# Patient Record
Sex: FEMALE | Race: WHITE | NOT HISPANIC OR LATINO | Employment: PART TIME | URBAN - METROPOLITAN AREA
[De-identification: names, ages, dates, MRNs, and addresses within clinical notes are randomized per-mention and may not be internally consistent; named-entity substitution may affect disease eponyms.]

---

## 2018-01-26 LAB
EXTERNAL HIV CONFIRMATION: NORMAL
EXTERNAL HIV SCREEN: NORMAL

## 2020-11-10 ENCOUNTER — TELEPHONE (OUTPATIENT)
Dept: ADMINISTRATIVE | Facility: OTHER | Age: 35
End: 2020-11-10

## 2020-11-11 ENCOUNTER — OFFICE VISIT (OUTPATIENT)
Dept: FAMILY MEDICINE CLINIC | Facility: CLINIC | Age: 35
End: 2020-11-11
Payer: COMMERCIAL

## 2020-11-11 VITALS
DIASTOLIC BLOOD PRESSURE: 78 MMHG | HEART RATE: 90 BPM | WEIGHT: 170 LBS | OXYGEN SATURATION: 99 % | HEIGHT: 64 IN | SYSTOLIC BLOOD PRESSURE: 118 MMHG | TEMPERATURE: 98.3 F | BODY MASS INDEX: 29.02 KG/M2

## 2020-11-11 DIAGNOSIS — Z13.1 SCREENING FOR DIABETES MELLITUS (DM): ICD-10-CM

## 2020-11-11 DIAGNOSIS — Z28.21 INFLUENZA VACCINATION DECLINED: ICD-10-CM

## 2020-11-11 DIAGNOSIS — K21.9 GASTROESOPHAGEAL REFLUX DISEASE WITHOUT ESOPHAGITIS: ICD-10-CM

## 2020-11-11 DIAGNOSIS — F43.23 SITUATIONAL MIXED ANXIETY AND DEPRESSIVE DISORDER: ICD-10-CM

## 2020-11-11 DIAGNOSIS — Z13.0 SCREENING FOR DEFICIENCY ANEMIA: ICD-10-CM

## 2020-11-11 DIAGNOSIS — Z76.89 ENCOUNTER TO ESTABLISH CARE: Primary | ICD-10-CM

## 2020-11-11 DIAGNOSIS — Z13.220 SCREENING FOR HYPERLIPIDEMIA: ICD-10-CM

## 2020-11-11 DIAGNOSIS — Z13.21 ENCOUNTER FOR VITAMIN DEFICIENCY SCREENING: ICD-10-CM

## 2020-11-11 DIAGNOSIS — E66.3 OVERWEIGHT WITH BODY MASS INDEX (BMI) OF 29 TO 29.9 IN ADULT: ICD-10-CM

## 2020-11-11 DIAGNOSIS — Z13.29 SCREENING FOR THYROID DISORDER: ICD-10-CM

## 2020-11-11 PROCEDURE — 99203 OFFICE O/P NEW LOW 30 MIN: CPT | Performed by: NURSE PRACTITIONER

## 2020-11-11 PROCEDURE — 3725F SCREEN DEPRESSION PERFORMED: CPT | Performed by: NURSE PRACTITIONER

## 2020-11-11 PROCEDURE — 3008F BODY MASS INDEX DOCD: CPT | Performed by: NURSE PRACTITIONER

## 2020-11-11 RX ORDER — ESCITALOPRAM OXALATE 10 MG/1
10 TABLET ORAL DAILY
Qty: 90 TABLET | Refills: 3 | Status: SHIPPED | OUTPATIENT
Start: 2020-11-11 | End: 2020-12-30 | Stop reason: ALTCHOICE

## 2020-11-11 RX ORDER — ESOMEPRAZOLE MAGNESIUM 40 MG/1
40 CAPSULE, DELAYED RELEASE ORAL
Qty: 30 CAPSULE | Refills: 0 | Status: SHIPPED | OUTPATIENT
Start: 2020-11-11 | End: 2020-12-15

## 2020-11-12 ENCOUNTER — TELEPHONE (OUTPATIENT)
Dept: FAMILY MEDICINE CLINIC | Facility: CLINIC | Age: 35
End: 2020-11-12

## 2020-12-12 DIAGNOSIS — K21.9 GASTROESOPHAGEAL REFLUX DISEASE WITHOUT ESOPHAGITIS: ICD-10-CM

## 2020-12-15 RX ORDER — ESOMEPRAZOLE MAGNESIUM 40 MG/1
CAPSULE, DELAYED RELEASE ORAL
Qty: 30 CAPSULE | Refills: 0 | Status: SHIPPED | OUTPATIENT
Start: 2020-12-15 | End: 2020-12-30 | Stop reason: SDUPTHER

## 2020-12-28 ENCOUNTER — LAB (OUTPATIENT)
Dept: LAB | Age: 35
End: 2020-12-28
Payer: COMMERCIAL

## 2020-12-28 DIAGNOSIS — Z13.0 SCREENING FOR DEFICIENCY ANEMIA: ICD-10-CM

## 2020-12-28 DIAGNOSIS — Z13.29 SCREENING FOR THYROID DISORDER: ICD-10-CM

## 2020-12-28 DIAGNOSIS — Z13.1 SCREENING FOR DIABETES MELLITUS (DM): ICD-10-CM

## 2020-12-28 DIAGNOSIS — Z13.21 ENCOUNTER FOR VITAMIN DEFICIENCY SCREENING: ICD-10-CM

## 2020-12-28 DIAGNOSIS — Z13.220 SCREENING FOR HYPERLIPIDEMIA: ICD-10-CM

## 2020-12-28 LAB
25(OH)D3 SERPL-MCNC: 13.6 NG/ML (ref 30–100)
ALBUMIN SERPL BCP-MCNC: 3.9 G/DL (ref 3.5–5)
ALP SERPL-CCNC: 59 U/L (ref 46–116)
ALT SERPL W P-5'-P-CCNC: 22 U/L (ref 12–78)
ANION GAP SERPL CALCULATED.3IONS-SCNC: 2 MMOL/L (ref 4–13)
AST SERPL W P-5'-P-CCNC: 8 U/L (ref 5–45)
BASOPHILS # BLD AUTO: 0.08 THOUSANDS/ΜL (ref 0–0.1)
BASOPHILS NFR BLD AUTO: 1 % (ref 0–1)
BILIRUB SERPL-MCNC: 0.31 MG/DL (ref 0.2–1)
BUN SERPL-MCNC: 12 MG/DL (ref 5–25)
CALCIUM SERPL-MCNC: 9.8 MG/DL (ref 8.3–10.1)
CHLORIDE SERPL-SCNC: 107 MMOL/L (ref 100–108)
CHOLEST SERPL-MCNC: 228 MG/DL (ref 50–200)
CO2 SERPL-SCNC: 29 MMOL/L (ref 21–32)
CREAT SERPL-MCNC: 0.72 MG/DL (ref 0.6–1.3)
EOSINOPHIL # BLD AUTO: 0.18 THOUSAND/ΜL (ref 0–0.61)
EOSINOPHIL NFR BLD AUTO: 2 % (ref 0–6)
ERYTHROCYTE [DISTWIDTH] IN BLOOD BY AUTOMATED COUNT: 14.1 % (ref 11.6–15.1)
GFR SERPL CREATININE-BSD FRML MDRD: 109 ML/MIN/1.73SQ M
GLUCOSE P FAST SERPL-MCNC: 101 MG/DL (ref 65–99)
HCT VFR BLD AUTO: 42.6 % (ref 34.8–46.1)
HDLC SERPL-MCNC: 51 MG/DL
HGB BLD-MCNC: 13.5 G/DL (ref 11.5–15.4)
IMM GRANULOCYTES # BLD AUTO: 0.01 THOUSAND/UL (ref 0–0.2)
IMM GRANULOCYTES NFR BLD AUTO: 0 % (ref 0–2)
LDLC SERPL CALC-MCNC: 136 MG/DL (ref 0–100)
LYMPHOCYTES # BLD AUTO: 1.99 THOUSANDS/ΜL (ref 0.6–4.47)
LYMPHOCYTES NFR BLD AUTO: 27 % (ref 14–44)
MCH RBC QN AUTO: 28.8 PG (ref 26.8–34.3)
MCHC RBC AUTO-ENTMCNC: 31.7 G/DL (ref 31.4–37.4)
MCV RBC AUTO: 91 FL (ref 82–98)
MONOCYTES # BLD AUTO: 0.64 THOUSAND/ΜL (ref 0.17–1.22)
MONOCYTES NFR BLD AUTO: 9 % (ref 4–12)
NEUTROPHILS # BLD AUTO: 4.55 THOUSANDS/ΜL (ref 1.85–7.62)
NEUTS SEG NFR BLD AUTO: 61 % (ref 43–75)
NONHDLC SERPL-MCNC: 177 MG/DL
NRBC BLD AUTO-RTO: 0 /100 WBCS
PLATELET # BLD AUTO: 291 THOUSANDS/UL (ref 149–390)
PMV BLD AUTO: 9.3 FL (ref 8.9–12.7)
POTASSIUM SERPL-SCNC: 4.6 MMOL/L (ref 3.5–5.3)
PROT SERPL-MCNC: 7.5 G/DL (ref 6.4–8.2)
RBC # BLD AUTO: 4.69 MILLION/UL (ref 3.81–5.12)
SODIUM SERPL-SCNC: 138 MMOL/L (ref 136–145)
TRIGL SERPL-MCNC: 205 MG/DL
TSH SERPL DL<=0.05 MIU/L-ACNC: 1.41 UIU/ML (ref 0.36–3.74)
WBC # BLD AUTO: 7.45 THOUSAND/UL (ref 4.31–10.16)

## 2020-12-28 PROCEDURE — 80053 COMPREHEN METABOLIC PANEL: CPT

## 2020-12-28 PROCEDURE — 84443 ASSAY THYROID STIM HORMONE: CPT

## 2020-12-28 PROCEDURE — 82306 VITAMIN D 25 HYDROXY: CPT

## 2020-12-28 PROCEDURE — 80061 LIPID PANEL: CPT

## 2020-12-28 PROCEDURE — 36415 COLL VENOUS BLD VENIPUNCTURE: CPT

## 2020-12-28 PROCEDURE — 85025 COMPLETE CBC W/AUTO DIFF WBC: CPT

## 2020-12-30 ENCOUNTER — LAB (OUTPATIENT)
Dept: LAB | Facility: CLINIC | Age: 35
End: 2020-12-30
Payer: COMMERCIAL

## 2020-12-30 ENCOUNTER — TRANSCRIBE ORDERS (OUTPATIENT)
Dept: LAB | Facility: CLINIC | Age: 35
End: 2020-12-30

## 2020-12-30 ENCOUNTER — OFFICE VISIT (OUTPATIENT)
Dept: FAMILY MEDICINE CLINIC | Facility: CLINIC | Age: 35
End: 2020-12-30
Payer: COMMERCIAL

## 2020-12-30 VITALS
SYSTOLIC BLOOD PRESSURE: 122 MMHG | BODY MASS INDEX: 29.71 KG/M2 | DIASTOLIC BLOOD PRESSURE: 62 MMHG | TEMPERATURE: 98.1 F | RESPIRATION RATE: 18 BRPM | OXYGEN SATURATION: 98 % | HEART RATE: 98 BPM | WEIGHT: 174 LBS | HEIGHT: 64 IN

## 2020-12-30 DIAGNOSIS — E66.3 OVERWEIGHT WITH BODY MASS INDEX (BMI) OF 29 TO 29.9 IN ADULT: ICD-10-CM

## 2020-12-30 DIAGNOSIS — K21.9 GASTROESOPHAGEAL REFLUX DISEASE WITHOUT ESOPHAGITIS: ICD-10-CM

## 2020-12-30 DIAGNOSIS — F41.9 ANXIETY: ICD-10-CM

## 2020-12-30 DIAGNOSIS — R19.8 SYMPTOMS OF GASTROESOPHAGEAL REFLUX: ICD-10-CM

## 2020-12-30 DIAGNOSIS — Z11.1 SCREENING FOR TUBERCULOSIS: ICD-10-CM

## 2020-12-30 DIAGNOSIS — F43.0 STRESS RESPONSE: ICD-10-CM

## 2020-12-30 DIAGNOSIS — Z13.0 SCREENING FOR DEFICIENCY ANEMIA: ICD-10-CM

## 2020-12-30 DIAGNOSIS — R14.2 BURPING: ICD-10-CM

## 2020-12-30 DIAGNOSIS — E55.9 VITAMIN D DEFICIENCY: ICD-10-CM

## 2020-12-30 DIAGNOSIS — Z00.00 ANNUAL PHYSICAL EXAM: Primary | ICD-10-CM

## 2020-12-30 DIAGNOSIS — Z13.31 POSITIVE DEPRESSION SCREENING: ICD-10-CM

## 2020-12-30 PROCEDURE — 3725F SCREEN DEPRESSION PERFORMED: CPT | Performed by: NURSE PRACTITIONER

## 2020-12-30 PROCEDURE — 4004F PT TOBACCO SCREEN RCVD TLK: CPT | Performed by: NURSE PRACTITIONER

## 2020-12-30 PROCEDURE — 36415 COLL VENOUS BLD VENIPUNCTURE: CPT

## 2020-12-30 PROCEDURE — 3008F BODY MASS INDEX DOCD: CPT | Performed by: NURSE PRACTITIONER

## 2020-12-30 PROCEDURE — 99395 PREV VISIT EST AGE 18-39: CPT | Performed by: NURSE PRACTITIONER

## 2020-12-30 PROCEDURE — 86480 TB TEST CELL IMMUN MEASURE: CPT

## 2020-12-30 RX ORDER — ESOMEPRAZOLE MAGNESIUM 40 MG/1
40 CAPSULE, DELAYED RELEASE ORAL
Qty: 90 CAPSULE | Refills: 1 | Status: SHIPPED | OUTPATIENT
Start: 2020-12-30 | End: 2021-03-31 | Stop reason: SDUPTHER

## 2020-12-30 RX ORDER — HYDROXYZINE PAMOATE 25 MG/1
25 CAPSULE ORAL 3 TIMES DAILY PRN
Qty: 30 CAPSULE | Refills: 0 | Status: SHIPPED | OUTPATIENT
Start: 2020-12-30 | End: 2021-02-21

## 2020-12-30 RX ORDER — HYDROXYZINE PAMOATE 25 MG/1
25 CAPSULE ORAL 3 TIMES DAILY PRN
Qty: 30 CAPSULE | Refills: 0 | Status: SHIPPED | OUTPATIENT
Start: 2020-12-30 | End: 2020-12-30 | Stop reason: SDUPTHER

## 2020-12-30 RX ORDER — ERGOCALCIFEROL 1.25 MG/1
50000 CAPSULE ORAL WEEKLY
Qty: 12 CAPSULE | Refills: 1 | Status: SHIPPED | OUTPATIENT
Start: 2020-12-30

## 2020-12-31 LAB
GAMMA INTERFERON BACKGROUND BLD IA-ACNC: 0.02 IU/ML
M TB IFN-G BLD-IMP: NEGATIVE
M TB IFN-G CD4+ BCKGRND COR BLD-ACNC: 0 IU/ML
M TB IFN-G CD4+ BCKGRND COR BLD-ACNC: 0 IU/ML
MITOGEN IGNF BCKGRD COR BLD-ACNC: >10 IU/ML

## 2021-02-21 DIAGNOSIS — F43.0 STRESS RESPONSE: ICD-10-CM

## 2021-02-21 DIAGNOSIS — F41.9 ANXIETY: ICD-10-CM

## 2021-02-21 RX ORDER — HYDROXYZINE PAMOATE 25 MG/1
CAPSULE ORAL
Qty: 30 CAPSULE | Refills: 0 | Status: SHIPPED | OUTPATIENT
Start: 2021-02-21 | End: 2021-08-05 | Stop reason: SINTOL

## 2021-03-31 ENCOUNTER — CONSULT (OUTPATIENT)
Dept: GASTROENTEROLOGY | Facility: CLINIC | Age: 36
End: 2021-03-31
Payer: COMMERCIAL

## 2021-03-31 VITALS
SYSTOLIC BLOOD PRESSURE: 108 MMHG | HEART RATE: 88 BPM | WEIGHT: 181.8 LBS | RESPIRATION RATE: 18 BRPM | BODY MASS INDEX: 31.04 KG/M2 | HEIGHT: 64 IN | DIASTOLIC BLOOD PRESSURE: 80 MMHG | TEMPERATURE: 97.5 F

## 2021-03-31 DIAGNOSIS — K21.9 GASTROESOPHAGEAL REFLUX DISEASE WITHOUT ESOPHAGITIS: ICD-10-CM

## 2021-03-31 DIAGNOSIS — R14.2 BURPING: ICD-10-CM

## 2021-03-31 DIAGNOSIS — R19.8 SYMPTOMS OF GASTROESOPHAGEAL REFLUX: ICD-10-CM

## 2021-03-31 PROCEDURE — 99204 OFFICE O/P NEW MOD 45 MIN: CPT | Performed by: PHYSICIAN ASSISTANT

## 2021-03-31 RX ORDER — ESOMEPRAZOLE MAGNESIUM 40 MG/1
40 CAPSULE, DELAYED RELEASE ORAL
Qty: 60 CAPSULE | Refills: 1 | Status: SHIPPED | OUTPATIENT
Start: 2021-03-31 | End: 2021-10-15

## 2021-03-31 NOTE — PROGRESS NOTES
[facility] - Outpatient Consultation  Linda Velazquez 39 y o  female MRN: [id number]  Encounter: [id number]          ASSESSMENT AND PLAN:      1  Gastroesophageal reflux disease without esophagitis    Patient presents for an evaluation of GERD x 1 year  She reports frequent regurgitation and belching  She reports partial improvement on Nexium 40mg po daily  Will plan for EGD to investigate for ulcers, esophagitis, hiatal hernia, Dial's esophagus, and bx for h pylori  Will increase Nexium to BID for 4-6 weeks  GERD modifications discussed  ______________________________________________________________________    HPI:  Patient is a 39year old female who presents to the office for an evaluation of GERD  Patient reports she has been struggling with GERD symptoms over the past year  She reports of frequent regurgitation, heartburn, and belching  No dysphagia  No abdominal pain  No blood in the stool  She was started on Nexium 40mg po daily by her PCP with some improvement  She has never had an EGD  No family history of Dial's esophagus or esophageal cancer  She does report a weight gain of 30 lbs since her pregnancy 3 years ago  She also reports significant stress in her life recently due to her 's diagnosis of stage IV colon cancer  REVIEW OF SYSTEMS:    CONSTITUTIONAL: Denies any fever, chills, rigors, and weight loss  HEENT: No earache or tinnitus  Denies hearing loss or visual disturbances  CARDIOVASCULAR: No chest pain or palpitations  RESPIRATORY: Denies any cough, hemoptysis, shortness of breath or dyspnea on exertion  GASTROINTESTINAL: As noted in the History of Present Illness  GENITOURINARY: No problems with urination  Denies any hematuria or dysuria  NEUROLOGIC: No dizziness or vertigo, denies headaches  MUSCULOSKELETAL: Denies any muscle or joint pain  SKIN: Denies skin rashes or itching     ENDOCRINE: Denies excessive thirst  Denies intolerance to heat or cold  PSYCHOSOCIAL: Denies depression or anxiety  Denies any recent memory loss  Historical Information   Past Medical History:   Diagnosis Date    Acid reflux      Past Surgical History:   Procedure Laterality Date     SECTION      DILATION AND CURETTAGE OF UTERUS      2x    TONSILECTOMY AND ADNOIDECTOMY      TUBAL LIGATION       Social History   Social History     Substance and Sexual Activity   Alcohol Use Yes    Frequency: 2-4 times a month    Comment: weekends mostly     Social History     Substance and Sexual Activity   Drug Use Never     Social History     Tobacco Use   Smoking Status Current Every Day Smoker    Packs/day: 0 25   Smokeless Tobacco Never Used     Family History   Problem Relation Age of Onset    Heart disease Mother     No Known Problems Father        Meds/Allergies       Current Outpatient Medications:     esomeprazole (NexIUM) 40 MG capsule    ergocalciferol (VITAMIN D2) 50,000 units    hydrOXYzine pamoate (VISTARIL) 25 mg capsule    Allergies   Allergen Reactions    Reglan [Metoclopramide]            Objective     Blood pressure 108/80, pulse 88, temperature 97 5 °F (36 4 °C), resp  rate 18, height 5' 3 5" (1 613 m), weight 82 5 kg (181 lb 12 8 oz)  Body mass index is 31 7 kg/m²  PHYSICAL EXAM:      General Appearance:   Alert, cooperative, no distress   HEENT:   Normocephalic, atraumatic, anicteric   Neck:  Supple, symmetrical, trachea midline   Lungs:   Clear to auscultation bilaterally; no rales, rhonchi or wheezing; respirations unlabored    Heart[de-identified]   Regular rate and rhythm; no murmur, rub, or gallop     Abdomen:   Soft, non-tender, non-distended; normal bowel sounds; no masses, no organomegaly    Genitalia:   Deferred    Rectal:   Deferred    Extremities:  No cyanosis, clubbing or edema    Pulses:  2+ and symmetric    Skin:  No jaundice, rashes, or lesions    Lymph nodes:  No palpable cervical lymphadenopathy        Lab Results: No visits with results within 1 Day(s) from this visit  Latest known visit with results is:   Lab on 12/30/2020   Component Date Value    QFT Nil 12/30/2020 0 02     QFT TB1-NIL 12/30/2020 0 00     QFT TB2-NIL 12/30/2020 0 00     QFT Mitogen-NIL 12/30/2020 >10 00     QFT Final Interpretation 12/30/2020 Negative          Radiology Results:   No results found

## 2021-03-31 NOTE — H&P (VIEW-ONLY)
[facility] - Outpatient Consultation  Silvana Garrett 39 y o  female MRN: [id number]  Encounter: [id number]          ASSESSMENT AND PLAN:      1  Gastroesophageal reflux disease without esophagitis    Patient presents for an evaluation of GERD x 1 year  She reports frequent regurgitation and belching  She reports partial improvement on Nexium 40mg po daily  Will plan for EGD to investigate for ulcers, esophagitis, hiatal hernia, Dial's esophagus, and bx for h pylori  Will increase Nexium to BID for 4-6 weeks  GERD modifications discussed  ______________________________________________________________________    HPI:  Patient is a 39year old female who presents to the office for an evaluation of GERD  Patient reports she has been struggling with GERD symptoms over the past year  She reports of frequent regurgitation, heartburn, and belching  No dysphagia  No abdominal pain  No blood in the stool  She was started on Nexium 40mg po daily by her PCP with some improvement  She has never had an EGD  No family history of Dial's esophagus or esophageal cancer  She does report a weight gain of 30 lbs since her pregnancy 3 years ago  She also reports significant stress in her life recently due to her 's diagnosis of stage IV colon cancer  REVIEW OF SYSTEMS:    CONSTITUTIONAL: Denies any fever, chills, rigors, and weight loss  HEENT: No earache or tinnitus  Denies hearing loss or visual disturbances  CARDIOVASCULAR: No chest pain or palpitations  RESPIRATORY: Denies any cough, hemoptysis, shortness of breath or dyspnea on exertion  GASTROINTESTINAL: As noted in the History of Present Illness  GENITOURINARY: No problems with urination  Denies any hematuria or dysuria  NEUROLOGIC: No dizziness or vertigo, denies headaches  MUSCULOSKELETAL: Denies any muscle or joint pain  SKIN: Denies skin rashes or itching     ENDOCRINE: Denies excessive thirst  Denies intolerance to heat or cold  PSYCHOSOCIAL: Denies depression or anxiety  Denies any recent memory loss  Historical Information   Past Medical History:   Diagnosis Date    Acid reflux      Past Surgical History:   Procedure Laterality Date     SECTION      DILATION AND CURETTAGE OF UTERUS      2x    TONSILECTOMY AND ADNOIDECTOMY      TUBAL LIGATION       Social History   Social History     Substance and Sexual Activity   Alcohol Use Yes    Frequency: 2-4 times a month    Comment: weekends mostly     Social History     Substance and Sexual Activity   Drug Use Never     Social History     Tobacco Use   Smoking Status Current Every Day Smoker    Packs/day: 0 25   Smokeless Tobacco Never Used     Family History   Problem Relation Age of Onset    Heart disease Mother     No Known Problems Father        Meds/Allergies       Current Outpatient Medications:     esomeprazole (NexIUM) 40 MG capsule    ergocalciferol (VITAMIN D2) 50,000 units    hydrOXYzine pamoate (VISTARIL) 25 mg capsule    Allergies   Allergen Reactions    Reglan [Metoclopramide]            Objective     Blood pressure 108/80, pulse 88, temperature 97 5 °F (36 4 °C), resp  rate 18, height 5' 3 5" (1 613 m), weight 82 5 kg (181 lb 12 8 oz)  Body mass index is 31 7 kg/m²  PHYSICAL EXAM:      General Appearance:   Alert, cooperative, no distress   HEENT:   Normocephalic, atraumatic, anicteric   Neck:  Supple, symmetrical, trachea midline   Lungs:   Clear to auscultation bilaterally; no rales, rhonchi or wheezing; respirations unlabored    Heart[de-identified]   Regular rate and rhythm; no murmur, rub, or gallop     Abdomen:   Soft, non-tender, non-distended; normal bowel sounds; no masses, no organomegaly    Genitalia:   Deferred    Rectal:   Deferred    Extremities:  No cyanosis, clubbing or edema    Pulses:  2+ and symmetric    Skin:  No jaundice, rashes, or lesions    Lymph nodes:  No palpable cervical lymphadenopathy        Lab Results: No visits with results within 1 Day(s) from this visit  Latest known visit with results is:   Lab on 12/30/2020   Component Date Value    QFT Nil 12/30/2020 0 02     QFT TB1-NIL 12/30/2020 0 00     QFT TB2-NIL 12/30/2020 0 00     QFT Mitogen-NIL 12/30/2020 >10 00     QFT Final Interpretation 12/30/2020 Negative          Radiology Results:   No results found

## 2021-04-17 ENCOUNTER — ANESTHESIA EVENT (OUTPATIENT)
Dept: GASTROENTEROLOGY | Facility: HOSPITAL | Age: 36
End: 2021-04-17

## 2021-04-19 ENCOUNTER — HOSPITAL ENCOUNTER (OUTPATIENT)
Dept: GASTROENTEROLOGY | Facility: HOSPITAL | Age: 36
Setting detail: OUTPATIENT SURGERY
Discharge: HOME | End: 2021-04-19
Attending: INTERNAL MEDICINE | Admitting: INTERNAL MEDICINE
Payer: COMMERCIAL

## 2021-04-19 ENCOUNTER — ANESTHESIA (OUTPATIENT)
Dept: GASTROENTEROLOGY | Facility: HOSPITAL | Age: 36
End: 2021-04-19

## 2021-04-19 VITALS
TEMPERATURE: 97.5 F | BODY MASS INDEX: 32.46 KG/M2 | HEART RATE: 78 BPM | WEIGHT: 183.2 LBS | OXYGEN SATURATION: 98 % | HEIGHT: 63 IN | RESPIRATION RATE: 17 BRPM | SYSTOLIC BLOOD PRESSURE: 134 MMHG | DIASTOLIC BLOOD PRESSURE: 93 MMHG

## 2021-04-19 DIAGNOSIS — R19.8 SYMPTOMS OF GASTROESOPHAGEAL REFLUX: ICD-10-CM

## 2021-04-19 DIAGNOSIS — K21.9 GASTROESOPHAGEAL REFLUX DISEASE WITHOUT ESOPHAGITIS: ICD-10-CM

## 2021-04-19 DIAGNOSIS — R14.2 BURPING: ICD-10-CM

## 2021-04-19 PROBLEM — F17.200 SMOKING: Status: ACTIVE | Noted: 2021-04-19

## 2021-04-19 PROBLEM — E66.9 OBESITY: Status: ACTIVE | Noted: 2020-11-11

## 2021-04-19 PROBLEM — IMO0001 SMOKING: Status: ACTIVE | Noted: 2021-04-19

## 2021-04-19 LAB
EXT PREGNANCY TEST URINE: NEGATIVE
EXT. CONTROL: NORMAL

## 2021-04-19 PROCEDURE — 88305 TISSUE EXAM BY PATHOLOGIST: CPT | Performed by: PATHOLOGY

## 2021-04-19 PROCEDURE — 81025 URINE PREGNANCY TEST: CPT | Performed by: ANESTHESIOLOGY

## 2021-04-19 PROCEDURE — 43239 EGD BIOPSY SINGLE/MULTIPLE: CPT | Performed by: INTERNAL MEDICINE

## 2021-04-19 RX ORDER — SODIUM CHLORIDE, SODIUM LACTATE, POTASSIUM CHLORIDE, CALCIUM CHLORIDE 600; 310; 30; 20 MG/100ML; MG/100ML; MG/100ML; MG/100ML
INJECTION, SOLUTION INTRAVENOUS CONTINUOUS PRN
Status: DISCONTINUED | OUTPATIENT
Start: 2021-04-19 | End: 2021-04-19

## 2021-04-19 RX ORDER — PROPOFOL 10 MG/ML
INJECTION, EMULSION INTRAVENOUS AS NEEDED
Status: DISCONTINUED | OUTPATIENT
Start: 2021-04-19 | End: 2021-04-19

## 2021-04-19 RX ORDER — SODIUM CHLORIDE, SODIUM LACTATE, POTASSIUM CHLORIDE, CALCIUM CHLORIDE 600; 310; 30; 20 MG/100ML; MG/100ML; MG/100ML; MG/100ML
125 INJECTION, SOLUTION INTRAVENOUS CONTINUOUS
Status: DISCONTINUED | OUTPATIENT
Start: 2021-04-19 | End: 2021-04-23 | Stop reason: HOSPADM

## 2021-04-19 RX ADMIN — PROPOFOL 50 MG: 10 INJECTION, EMULSION INTRAVENOUS at 10:44

## 2021-04-19 RX ADMIN — SODIUM CHLORIDE, SODIUM LACTATE, POTASSIUM CHLORIDE, AND CALCIUM CHLORIDE: .6; .31; .03; .02 INJECTION, SOLUTION INTRAVENOUS at 10:15

## 2021-04-19 RX ADMIN — SODIUM CHLORIDE, SODIUM LACTATE, POTASSIUM CHLORIDE, AND CALCIUM CHLORIDE 125 ML/HR: .6; .31; .03; .02 INJECTION, SOLUTION INTRAVENOUS at 10:34

## 2021-04-19 RX ADMIN — PROPOFOL 150 MG: 10 INJECTION, EMULSION INTRAVENOUS at 10:40

## 2021-04-19 RX ADMIN — PROPOFOL 20 MG: 10 INJECTION, EMULSION INTRAVENOUS at 10:47

## 2021-04-19 NOTE — INTERVAL H&P NOTE
H&P reviewed  After examining the patient I find no changes in the patients condition since the H&P had been written      Vitals:    04/19/21 1027   BP: 136/90   Pulse: 77   Resp: 16   Temp: 97 5 °F (36 4 °C)   SpO2: 99%

## 2021-04-19 NOTE — ANESTHESIA PREPROCEDURE EVALUATION
Procedure:  EGD    Relevant Problems   GI/HEPATIC   (+) Gastroesophageal reflux disease without esophagitis      NEURO/PSYCH   (+) Anxiety   (+) Stress response      PULMONARY   (+) Smoking      Other   (+) Obesity        Physical Exam    Airway    Mallampati score: II  TM Distance: >3 FB  Neck ROM: full     Dental   Comment: Denies loose teeth,     Cardiovascular  Cardiovascular exam normal    Pulmonary  Pulmonary exam normal     Other Findings  Portions of exam deferred due to low yield and/or unknown COVID status  Blister on lower right lip      Anesthesia Plan  ASA Score- 2     Anesthesia Type- IV sedation with anesthesia with ASA Monitors  Additional Monitors:   Airway Plan:           Plan Factors-Exercise tolerance (METS): >4 METS  Chart reviewed  Existing labs reviewed  Patient summary reviewed  Patient is a current smoker  Induction- intravenous  Postoperative Plan-     Informed Consent- Anesthetic plan and risks discussed with patient  I personally reviewed this patient with the CRNA  Discussed and agreed on the Anesthesia Plan with the MICHELLE Lima

## 2021-04-22 ENCOUNTER — TELEPHONE (OUTPATIENT)
Dept: GASTROENTEROLOGY | Facility: CLINIC | Age: 36
End: 2021-04-22

## 2021-04-22 NOTE — TELEPHONE ENCOUNTER
ptn esomeprazole requires prior auth per [facility] please call [phone]  Insurance will only cover 30 pills every 20 days and ptn needs 60 pills every 30 days as prescribed

## 2021-06-02 ENCOUNTER — OFFICE VISIT (OUTPATIENT)
Dept: FAMILY MEDICINE CLINIC | Facility: CLINIC | Age: 36
End: 2021-06-02
Payer: COMMERCIAL

## 2021-06-02 VITALS
SYSTOLIC BLOOD PRESSURE: 108 MMHG | HEART RATE: 70 BPM | DIASTOLIC BLOOD PRESSURE: 70 MMHG | TEMPERATURE: 96.6 F | OXYGEN SATURATION: 98 % | HEIGHT: 63 IN | BODY MASS INDEX: 32.43 KG/M2 | WEIGHT: 183 LBS

## 2021-06-02 DIAGNOSIS — H92.03 OTALGIA OF BOTH EARS: Primary | ICD-10-CM

## 2021-06-02 DIAGNOSIS — H69.90 DISORDER OF EUSTACHIAN TUBE, UNSPECIFIED LATERALITY: ICD-10-CM

## 2021-06-02 DIAGNOSIS — J02.9 SORE THROAT: ICD-10-CM

## 2021-06-02 PROCEDURE — 4004F PT TOBACCO SCREEN RCVD TLK: CPT | Performed by: NURSE PRACTITIONER

## 2021-06-02 PROCEDURE — 99213 OFFICE O/P EST LOW 20 MIN: CPT | Performed by: NURSE PRACTITIONER

## 2021-06-02 PROCEDURE — 3008F BODY MASS INDEX DOCD: CPT | Performed by: NURSE PRACTITIONER

## 2021-06-02 RX ORDER — METHYLPREDNISOLONE 4 MG/1
TABLET ORAL
Qty: 21 EACH | Refills: 0 | Status: SHIPPED | OUTPATIENT
Start: 2021-06-02 | End: 2021-07-30 | Stop reason: HOSPADM

## 2021-06-02 NOTE — PROGRESS NOTES
Assessment/Plan:    Problem List Items Addressed This Visit     None      Visit Diagnoses     Otalgia of both ears    -  Primary    Relevant Medications    methylPREDNISolone 4 MG tablet therapy pack    Sore throat        Disorder of Eustachian tube, unspecified laterality        Relevant Medications    methylPREDNISolone 4 MG tablet therapy pack           Diagnoses and all orders for this visit:    Otalgia of both ears  -     methylPREDNISolone 4 MG tablet therapy pack; Use as directed on package    Sore throat    Disorder of Eustachian tube, unspecified laterality  -     methylPREDNISolone 4 MG tablet therapy pack; Use as directed on package        No problem-specific Assessment & Plan notes found for this encounter  Subjective:      Patient ID: Dinorah Snow is a 39 y o  female  [facility]    C/O BL ear pain and sore throat for 4 days  Home Tx: none  Other sick contacts:none      Sore Throat   The current episode started in the past 7 days  Neither side of throat is experiencing more pain than the other  There has been no fever  Associated symptoms include ear pain  She has had no exposure to strep or mono  She has tried nothing for the symptoms  The treatment provided no relief  The following portions of the patient's history were reviewed and updated as appropriate:   She has a past medical history of Acid reflux, GERD (gastroesophageal reflux disease), and Hyperlipidemia ,  does not have any pertinent problems on file  ,   has a past surgical history that includes TONSILECTOMY AND ADNOIDECTOMY; Dilation and curettage of uterus;  section; and Tubal ligation  ,  family history includes Heart disease in her mother; No Known Problems in her father  ,   reports that she has been smoking  She has been smoking about 0 25 packs per day  She has never used smokeless tobacco  She reports current alcohol use of about 2 0 standard drinks of alcohol per week   She reports that she does not use drugs ,  is allergic to reglan [metoclopramide]     Current Outpatient Medications   Medication Sig Dispense Refill    ergocalciferol (VITAMIN D2) 50,000 units Take 1 capsule (50,000 Units total) by mouth once a week (Patient not taking: Reported on 6/2/2021) 12 capsule 1    esomeprazole (NexIUM) 40 MG capsule Take 1 capsule (40 mg total) by mouth 2 (two) times a day before meals 60 capsule 1    hydrOXYzine pamoate (VISTARIL) 25 mg capsule take 1 capsule by mouth three times a day if needed (Patient not taking: Reported on 3/31/2021) 30 capsule 0    methylPREDNISolone 4 MG tablet therapy pack Use as directed on package 21 each 0     No current facility-administered medications for this visit  Review of Systems   HENT: Positive for ear pain and sore throat  All other systems reviewed and are negative  Objective:  Vitals:    06/02/21 0833   BP: 108/70   BP Location: Left arm   Patient Position: Sitting   Cuff Size: Standard   Pulse: 70   Temp: (!) 96 6 °F (35 9 °C)   TempSrc: Tympanic   SpO2: 98%   Weight: 83 kg (183 lb)   Height: 5' 3" (1 6 m)     Body mass index is 32 42 kg/m²  Physical Exam  Vitals signs and nursing note reviewed  Constitutional:       Appearance: Normal appearance  She is well-developed  HENT:      Head: Normocephalic and atraumatic  Right Ear: Hearing, tympanic membrane, ear canal and external ear normal       Left Ear: Hearing, tympanic membrane, ear canal and external ear normal       Nose: Nose normal       Mouth/Throat:      Mouth: Mucous membranes are moist       Pharynx: Uvula midline  Eyes:      General: Lids are normal       Conjunctiva/sclera: Conjunctivae normal       Pupils: Pupils are equal, round, and reactive to light  Neck:      Musculoskeletal: Full passive range of motion without pain, normal range of motion and neck supple  Thyroid: No thyroid mass or thyromegaly  Vascular: No JVD        Trachea: Trachea and phonation normal  Cardiovascular:      Rate and Rhythm: Normal rate and regular rhythm  Pulses: Normal pulses  Heart sounds: Normal heart sounds, S1 normal and S2 normal  No murmur  No friction rub  No gallop  Pulmonary:      Effort: Pulmonary effort is normal       Breath sounds: Normal breath sounds  Abdominal:      General: Bowel sounds are normal       Palpations: Abdomen is soft  Tenderness: There is no abdominal tenderness  Genitourinary:     Comments: Deferred   Musculoskeletal: Normal range of motion  Right lower leg: No edema  Left lower leg: No edema  Lymphadenopathy:      Head:      Right side of head: No submental, submandibular, tonsillar, preauricular, posterior auricular or occipital adenopathy  Left side of head: No submental, submandibular, tonsillar, preauricular, posterior auricular or occipital adenopathy  Cervical: No cervical adenopathy  Skin:     General: Skin is warm and dry  Capillary Refill: Capillary refill takes less than 2 seconds  Neurological:      General: No focal deficit present  Mental Status: She is alert and oriented to person, place, and time  Cranial Nerves: Cranial nerves are intact  No cranial nerve deficit  Sensory: Sensation is intact  Motor: Motor function is intact  Coordination: Coordination is intact  Gait: Gait is intact  Deep Tendon Reflexes: Reflexes are normal and symmetric  Psychiatric:         Attention and Perception: Attention and perception normal          Mood and Affect: Mood and affect normal          Speech: Speech normal          Behavior: Behavior normal  Behavior is cooperative  Thought Content:  Thought content normal          Cognition and Memory: Cognition normal          Judgment: Judgment normal

## 2021-07-15 DIAGNOSIS — Z20.822 EXPOSURE TO COVID-19 VIRUS: Primary | ICD-10-CM

## 2021-07-15 PROCEDURE — U0003 INFECTIOUS AGENT DETECTION BY NUCLEIC ACID (DNA OR RNA); SEVERE ACUTE RESPIRATORY SYNDROME CORONAVIRUS 2 (SARS-COV-2) (CORONAVIRUS DISEASE [COVID-19]), AMPLIFIED PROBE TECHNIQUE, MAKING USE OF HIGH THROUGHPUT TECHNOLOGIES AS DESCRIBED BY CMS-2020-01-R: HCPCS | Performed by: INTERNAL MEDICINE

## 2021-07-15 PROCEDURE — U0005 INFEC AGEN DETEC AMPLI PROBE: HCPCS | Performed by: INTERNAL MEDICINE

## 2021-07-25 ENCOUNTER — APPOINTMENT (EMERGENCY)
Dept: RADIOLOGY | Facility: HOSPITAL | Age: 36
DRG: 137 | End: 2021-07-25
Payer: COMMERCIAL

## 2021-07-25 ENCOUNTER — HOSPITAL ENCOUNTER (EMERGENCY)
Facility: HOSPITAL | Age: 36
Discharge: HOME | DRG: 137 | End: 2021-07-25
Attending: EMERGENCY MEDICINE | Admitting: EMERGENCY MEDICINE
Payer: COMMERCIAL

## 2021-07-25 VITALS
SYSTOLIC BLOOD PRESSURE: 126 MMHG | WEIGHT: 182.1 LBS | BODY MASS INDEX: 32.26 KG/M2 | RESPIRATION RATE: 22 BRPM | OXYGEN SATURATION: 98 % | HEART RATE: 71 BPM | TEMPERATURE: 98.6 F | DIASTOLIC BLOOD PRESSURE: 76 MMHG

## 2021-07-25 DIAGNOSIS — J12.82 PNEUMONIA DUE TO COVID-19 VIRUS: Primary | ICD-10-CM

## 2021-07-25 DIAGNOSIS — U07.1 PNEUMONIA DUE TO COVID-19 VIRUS: Primary | ICD-10-CM

## 2021-07-25 LAB
ALBUMIN SERPL BCP-MCNC: 3.1 G/DL (ref 3.5–5)
ALP SERPL-CCNC: 67 U/L (ref 46–116)
ALT SERPL W P-5'-P-CCNC: 54 U/L (ref 12–78)
ANION GAP SERPL CALCULATED.3IONS-SCNC: 10 MMOL/L (ref 4–13)
APTT PPP: 28 SECONDS (ref 23–37)
AST SERPL W P-5'-P-CCNC: 49 U/L (ref 5–45)
BASOPHILS # BLD AUTO: 0.01 THOUSANDS/ΜL (ref 0–0.1)
BASOPHILS NFR BLD AUTO: 0 % (ref 0–1)
BILIRUB DIRECT SERPL-MCNC: 0.08 MG/DL (ref 0–0.2)
BILIRUB SERPL-MCNC: 0.2 MG/DL (ref 0.2–1)
BUN SERPL-MCNC: 8 MG/DL (ref 5–25)
CALCIUM SERPL-MCNC: 8.2 MG/DL (ref 8.3–10.1)
CHLORIDE SERPL-SCNC: 100 MMOL/L (ref 100–108)
CO2 SERPL-SCNC: 26 MMOL/L (ref 21–32)
CREAT SERPL-MCNC: 0.75 MG/DL (ref 0.6–1.3)
EOSINOPHIL # BLD AUTO: 0 THOUSAND/ΜL (ref 0–0.61)
EOSINOPHIL NFR BLD AUTO: 0 % (ref 0–6)
ERYTHROCYTE [DISTWIDTH] IN BLOOD BY AUTOMATED COUNT: 13.2 % (ref 11.6–15.1)
GFR SERPL CREATININE-BSD FRML MDRD: 103 ML/MIN/1.73SQ M
GLUCOSE SERPL-MCNC: 110 MG/DL (ref 65–140)
HCT VFR BLD AUTO: 41.7 % (ref 34.8–46.1)
HGB BLD-MCNC: 13.6 G/DL (ref 11.5–15.4)
IMM GRANULOCYTES # BLD AUTO: 0.01 THOUSAND/UL (ref 0–0.2)
IMM GRANULOCYTES NFR BLD AUTO: 0 % (ref 0–2)
INR PPP: 0.91 (ref 0.84–1.19)
LACTATE SERPL-SCNC: 1.2 MMOL/L (ref 0.5–2)
LYMPHOCYTES # BLD AUTO: 1.3 THOUSANDS/ΜL (ref 0.6–4.47)
LYMPHOCYTES NFR BLD AUTO: 30 % (ref 14–44)
MAGNESIUM SERPL-MCNC: 1.6 MG/DL (ref 1.6–2.6)
MCH RBC QN AUTO: 27.8 PG (ref 26.8–34.3)
MCHC RBC AUTO-ENTMCNC: 32.6 G/DL (ref 31.4–37.4)
MCV RBC AUTO: 85 FL (ref 82–98)
MONOCYTES # BLD AUTO: 0.31 THOUSAND/ΜL (ref 0.17–1.22)
MONOCYTES NFR BLD AUTO: 7 % (ref 4–12)
NEUTROPHILS # BLD AUTO: 2.68 THOUSANDS/ΜL (ref 1.85–7.62)
NEUTS SEG NFR BLD AUTO: 63 % (ref 43–75)
NRBC BLD AUTO-RTO: 0 /100 WBCS
PLATELET # BLD AUTO: 185 THOUSANDS/UL (ref 149–390)
PMV BLD AUTO: 9.3 FL (ref 8.9–12.7)
POTASSIUM SERPL-SCNC: 3.5 MMOL/L (ref 3.5–5.3)
PROT SERPL-MCNC: 7.4 G/DL (ref 6.4–8.2)
PROTHROMBIN TIME: 12.5 SECONDS (ref 11.6–14.5)
RBC # BLD AUTO: 4.89 MILLION/UL (ref 3.81–5.12)
SODIUM SERPL-SCNC: 136 MMOL/L (ref 136–145)
TROPONIN I SERPL-MCNC: <0.02 NG/ML
WBC # BLD AUTO: 4.31 THOUSAND/UL (ref 4.31–10.16)

## 2021-07-25 PROCEDURE — 99285 EMERGENCY DEPT VISIT HI MDM: CPT | Performed by: EMERGENCY MEDICINE

## 2021-07-25 PROCEDURE — 93005 ELECTROCARDIOGRAM TRACING: CPT

## 2021-07-25 PROCEDURE — 99285 EMERGENCY DEPT VISIT HI MDM: CPT

## 2021-07-25 PROCEDURE — 80048 BASIC METABOLIC PNL TOTAL CA: CPT | Performed by: EMERGENCY MEDICINE

## 2021-07-25 PROCEDURE — 96361 HYDRATE IV INFUSION ADD-ON: CPT

## 2021-07-25 PROCEDURE — 96374 THER/PROPH/DIAG INJ IV PUSH: CPT

## 2021-07-25 PROCEDURE — 80076 HEPATIC FUNCTION PANEL: CPT | Performed by: EMERGENCY MEDICINE

## 2021-07-25 PROCEDURE — 83735 ASSAY OF MAGNESIUM: CPT | Performed by: EMERGENCY MEDICINE

## 2021-07-25 PROCEDURE — 85025 COMPLETE CBC W/AUTO DIFF WBC: CPT | Performed by: EMERGENCY MEDICINE

## 2021-07-25 PROCEDURE — 84484 ASSAY OF TROPONIN QUANT: CPT | Performed by: EMERGENCY MEDICINE

## 2021-07-25 PROCEDURE — 85730 THROMBOPLASTIN TIME PARTIAL: CPT | Performed by: EMERGENCY MEDICINE

## 2021-07-25 PROCEDURE — 36415 COLL VENOUS BLD VENIPUNCTURE: CPT | Performed by: EMERGENCY MEDICINE

## 2021-07-25 PROCEDURE — 71045 X-RAY EXAM CHEST 1 VIEW: CPT

## 2021-07-25 PROCEDURE — 83605 ASSAY OF LACTIC ACID: CPT | Performed by: EMERGENCY MEDICINE

## 2021-07-25 PROCEDURE — 96375 TX/PRO/DX INJ NEW DRUG ADDON: CPT

## 2021-07-25 PROCEDURE — 85610 PROTHROMBIN TIME: CPT | Performed by: EMERGENCY MEDICINE

## 2021-07-25 RX ORDER — KETOROLAC TROMETHAMINE 30 MG/ML
15 INJECTION, SOLUTION INTRAMUSCULAR; INTRAVENOUS ONCE
Status: COMPLETED | OUTPATIENT
Start: 2021-07-25 | End: 2021-07-25

## 2021-07-25 RX ORDER — ALBUTEROL SULFATE 90 UG/1
5 AEROSOL, METERED RESPIRATORY (INHALATION) ONCE
Status: COMPLETED | OUTPATIENT
Start: 2021-07-25 | End: 2021-07-25

## 2021-07-25 RX ORDER — ONDANSETRON 4 MG/1
4 TABLET, ORALLY DISINTEGRATING ORAL EVERY 8 HOURS PRN
Qty: 12 TABLET | Refills: 0 | Status: SHIPPED | OUTPATIENT
Start: 2021-07-25

## 2021-07-25 RX ORDER — MULTIVITAMIN
1 TABLET ORAL DAILY
Qty: 30 TABLET | Refills: 0 | Status: SHIPPED | OUTPATIENT
Start: 2021-07-25

## 2021-07-25 RX ORDER — GUAIFENESIN 600 MG
1200 TABLET, EXTENDED RELEASE 12 HR ORAL ONCE
Status: COMPLETED | OUTPATIENT
Start: 2021-07-25 | End: 2021-07-25

## 2021-07-25 RX ORDER — ONDANSETRON 2 MG/ML
4 INJECTION INTRAMUSCULAR; INTRAVENOUS ONCE
Status: COMPLETED | OUTPATIENT
Start: 2021-07-25 | End: 2021-07-25

## 2021-07-25 RX ORDER — MELATONIN
2000 DAILY
Qty: 10 TABLET | Refills: 0 | Status: SHIPPED | OUTPATIENT
Start: 2021-07-25

## 2021-07-25 RX ORDER — MULTIVIT WITH MINERALS/LUTEIN
1000 TABLET ORAL EVERY 12 HOURS SCHEDULED
Qty: 20 TABLET | Refills: 0 | Status: SHIPPED | OUTPATIENT
Start: 2021-07-25

## 2021-07-25 RX ADMIN — GUAIFENESIN 1200 MG: 600 TABLET ORAL at 09:14

## 2021-07-25 RX ADMIN — KETOROLAC TROMETHAMINE 15 MG: 30 INJECTION, SOLUTION INTRAMUSCULAR at 09:18

## 2021-07-25 RX ADMIN — ALBUTEROL SULFATE 5 PUFF: 90 AEROSOL, METERED RESPIRATORY (INHALATION) at 09:18

## 2021-07-25 RX ADMIN — ONDANSETRON 4 MG: 2 INJECTION INTRAMUSCULAR; INTRAVENOUS at 09:15

## 2021-07-25 RX ADMIN — SODIUM CHLORIDE 1000 ML: 0.9 INJECTION, SOLUTION INTRAVENOUS at 09:14

## 2021-07-25 NOTE — ED NOTES
Ambulated patient x1 time around th ED- pt c/o SOB with exertion, 02 saturation remained between 95-97% with ambulation      Galen Carlin RN  07/25/21 7817

## 2021-07-25 NOTE — ED PROVIDER NOTES
History  Chief Complaint   Patient presents with    Shortness of Breath     pt states to have tested positive for covid on the 15th of this month  pt states "i feel like i just keep getting worse" pt c/o fever, SOB  pt afebrile during triage  pt states to have last taken tylenol at 630 this morning      Patient is a 77-year-old female with past medical history of GERD, diet-controlled hyperlipidemia, presents to the emergency department for worsening flu-like symptoms in the setting of known COVID-19 infection  Patient reports that she was diagnosed with COVID-19 on 7/15  She states it started with generalized body aches, nausea and then developed into fever, productive cough of dark phlegm, progressive dyspnea as well as lightheadedness  Patient reports she did have a fever this morning but took Tylenol and ibuprofen prior to coming to the ED  She reports her O2 sat at home was 87% which prompted her to come to the ER for evaluation  Currently patient satting 99% on room air and does not appear to be in any respiratory distress  Reports mild runny nose and congestion  She denies headache, vertigo, syncope, sore throat, earache, neck pain or stiffness, chest pain, palpitations, abdominal pain or distention, diarrhea or vomiting, urinary symptoms, skin rash or color change, leg pain or swelling, weakness or paresthesia or other focal neurologic deficits  History provided by:  Patient   used: No        Prior to Admission Medications   Prescriptions Last Dose Informant Patient Reported?  Taking?   ergocalciferol (VITAMIN D2) 50,000 units   No No   Sig: Take 1 capsule (50,000 Units total) by mouth once a week   Patient not taking: Reported on 6/2/2021   esomeprazole (NexIUM) 40 MG capsule   No No   Sig: Take 1 capsule (40 mg total) by mouth 2 (two) times a day before meals   hydrOXYzine pamoate (VISTARIL) 25 mg capsule   No No   Sig: take 1 capsule by mouth three times a day if needed Patient not taking: Reported on 3/31/2021   methylPREDNISolone 4 MG tablet therapy pack   No No   Sig: Use as directed on package      Facility-Administered Medications: None       Past Medical History:   Diagnosis Date    Acid reflux     GERD (gastroesophageal reflux disease)     Hyperlipidemia        Past Surgical History:   Procedure Laterality Date     SECTION      DILATION AND CURETTAGE OF UTERUS      2x    TONSILECTOMY AND ADNOIDECTOMY      TONSILLECTOMY      TUBAL LIGATION         Family History   Problem Relation Age of Onset    Heart disease Mother     No Known Problems Father      I have reviewed and agree with the history as documented  E-Cigarette/Vaping    E-Cigarette Use Never User      E-Cigarette/Vaping Substances     Social History     Tobacco Use    Smoking status: Current Every Day Smoker     Packs/day: 0 25    Smokeless tobacco: Never Used   Vaping Use    Vaping Use: Never used   Substance Use Topics    Alcohol use: Yes     Alcohol/week: 2 0 standard drinks     Types: 2 Glasses of wine per week     Comment: weekends mostly    Drug use: Never       Review of Systems   Constitutional: Positive for appetite change, chills, fatigue and fever  HENT: Positive for congestion and rhinorrhea  Negative for ear pain and sore throat  Eyes: Negative for photophobia, pain and visual disturbance  Respiratory: Positive for cough and shortness of breath  Negative for chest tightness, wheezing and stridor  Cardiovascular: Negative for chest pain, palpitations and leg swelling  Gastrointestinal: Positive for nausea  Negative for abdominal distention, abdominal pain, blood in stool, constipation, diarrhea and vomiting  Genitourinary: Negative for dysuria, flank pain, frequency and hematuria  Musculoskeletal: Positive for myalgias  Negative for back pain and neck pain  Skin: Negative for color change, pallor, rash and wound     Allergic/Immunologic: Negative for immunocompromised state  Neurological: Positive for light-headedness  Negative for dizziness, syncope, facial asymmetry, speech difficulty, weakness, numbness and headaches  Hematological: Negative for adenopathy  Psychiatric/Behavioral: Negative for confusion and decreased concentration  All other systems reviewed and are negative  Physical Exam  Physical Exam  Vitals and nursing note reviewed  Constitutional:       General: She is not in acute distress  Appearance: Normal appearance  She is well-developed  She is not ill-appearing, toxic-appearing or diaphoretic  HENT:      Head: Normocephalic and atraumatic  Right Ear: External ear normal       Left Ear: External ear normal       Nose: Nose normal       Mouth/Throat:      Mouth: Mucous membranes are moist       Pharynx: Oropharynx is clear  No oropharyngeal exudate  Eyes:      Extraocular Movements: Extraocular movements intact  Conjunctiva/sclera: Conjunctivae normal       Pupils: Pupils are equal, round, and reactive to light  Neck:      Vascular: No JVD  Cardiovascular:      Rate and Rhythm: Normal rate and regular rhythm  Pulses: Normal pulses  Heart sounds: Normal heart sounds  No murmur heard  No friction rub  No gallop  Pulmonary:      Effort: Pulmonary effort is normal  No respiratory distress  Breath sounds: Normal breath sounds  No wheezing, rhonchi or rales  Abdominal:      General: There is no distension  Palpations: Abdomen is soft  Tenderness: There is no abdominal tenderness  There is no guarding or rebound  Musculoskeletal:         General: No swelling or tenderness  Normal range of motion  Cervical back: Normal range of motion and neck supple  No rigidity  Skin:     General: Skin is warm and dry  Coloration: Skin is not pale  Findings: No erythema or rash  Neurological:      General: No focal deficit present        Mental Status: She is alert and oriented to person, place, and time  Sensory: No sensory deficit  Motor: No weakness     Psychiatric:         Mood and Affect: Mood normal          Behavior: Behavior normal          Vital Signs  ED Triage Vitals   Temperature Pulse Respirations Blood Pressure SpO2   07/25/21 0756 07/25/21 0756 07/25/21 0756 07/25/21 0756 07/25/21 0756   98 6 °F (37 °C) 88 22 132/87 99 %      Temp Source Heart Rate Source Patient Position - Orthostatic VS BP Location FiO2 (%)   07/25/21 0756 07/25/21 0756 07/25/21 0756 07/25/21 0756 --   Oral Monitor Lying Right arm       Pain Score       07/25/21 0918       No Pain         Vitals:    07/25/21 0756 07/25/21 0915 07/25/21 1205   BP: 132/87 127/76 126/76   BP Location: Right arm Right arm Right arm   Pulse: 88 81 71   Resp: 22 22 22   Temp: 98 6 °F (37 °C)     TempSrc: Oral     SpO2: 99% 98% 98%   Weight: 82 6 kg (182 lb 1 6 oz)         Visual Acuity      ED Medications  Medications   sodium chloride 0 9 % bolus 1,000 mL (0 mL Intravenous Stopped 7/25/21 1221)   albuterol (PROVENTIL HFA,VENTOLIN HFA) inhaler 5 puff (5 puffs Inhalation Given 7/25/21 0918)   ondansetron (ZOFRAN) injection 4 mg (4 mg Intravenous Given 7/25/21 0915)   ketorolac (TORADOL) injection 15 mg (15 mg Intravenous Given 7/25/21 0918)   guaiFENesin (MUCINEX) 12 hr tablet 1,200 mg (1,200 mg Oral Given 7/25/21 0914)       Diagnostic Studies  Results Reviewed     Procedure Component Value Units Date/Time    Basic metabolic panel [[id number]]  (Abnormal) Collected: 07/25/21 0904    Lab Status: Final result Specimen: Blood from Arm, Left Updated: 07/25/21 0934     Sodium 136 mmol/L      Potassium 3 5 mmol/L      Chloride 100 mmol/L      CO2 26 mmol/L      ANION GAP 10 mmol/L      BUN 8 mg/dL      Creatinine 0 75 mg/dL      Glucose 110 mg/dL      Calcium 8 2 mg/dL      eGFR 103 ml/min/1 73sq m     Narrative:      [place] guidelines for Chronic Kidney Disease (CKD):     Stage 1 with normal or high GFR (GFR > 90 mL/min/1 73 square meters)    Stage 2 Mild CKD (GFR = 60-89 mL/min/1 73 square meters)    Stage 3A Moderate CKD (GFR = 45-59 mL/min/1 73 square meters)    Stage 3B Moderate CKD (GFR = 30-44 mL/min/1 73 square meters)    Stage 4 Severe CKD (GFR = 15-29 mL/min/1 73 square meters)    Stage 5 End Stage CKD (GFR <15 mL/min/1 73 square meters)  Note: GFR calculation is accurate only with a steady state creatinine    Hepatic function panel [[id number]]  (Abnormal) Collected: 07/25/21 0904    Lab Status: Final result Specimen: Blood from Arm, Left Updated: 07/25/21 0934     Total Bilirubin 0 20 mg/dL      Bilirubin, Direct 0 08 mg/dL      Alkaline Phosphatase 67 U/L      AST 49 U/L      ALT 54 U/L      Total Protein 7 4 g/dL      Albumin 3 1 g/dL     Magnesium [[id number]]  (Normal) Collected: 07/25/21 0904    Lab Status: Final result Specimen: Blood from Arm, Left Updated: 07/25/21 0934     Magnesium 1 6 mg/dL     Lactic acid [[id number]]  (Normal) Collected: 07/25/21 0904    Lab Status: Final result Specimen: Blood from Arm, Left Updated: 07/25/21 0928     LACTIC ACID 1 2 mmol/L     Narrative:      Result may be elevated if tourniquet was used during collection      Troponin I [[id number]]  (Normal) Collected: 07/25/21 0904    Lab Status: Final result Specimen: Blood from Arm, Left Updated: 07/25/21 0928     Troponin I <0 02 ng/mL     Protime-INR [[id number]]  (Normal) Collected: 07/25/21 0904    Lab Status: Final result Specimen: Blood from Arm, Left Updated: 07/25/21 0921     Protime 12 5 seconds      INR 0 91    APTT [[id number]]  (Normal) Collected: 07/25/21 0904    Lab Status: Final result Specimen: Blood from Arm, Left Updated: 07/25/21 0921     PTT 28 seconds     CBC and differential [[id number]] Collected: 07/25/21 0904    Lab Status: Final result Specimen: Blood from Arm, Left Updated: 07/25/21 0912     WBC 4 31 Thousand/uL      RBC 4 89 Million/uL      Hemoglobin 13 6 g/dL      Hematocrit 41 7 % MCV 85 fL      MCH 27 8 pg      MCHC 32 6 g/dL      RDW 13 2 %      MPV 9 3 fL      Platelets 908 Thousands/uL      nRBC 0 /100 WBCs      Neutrophils Relative 63 %      Immat GRANS % 0 %      Lymphocytes Relative 30 %      Monocytes Relative 7 %      Eosinophils Relative 0 %      Basophils Relative 0 %      Neutrophils Absolute 2 68 Thousands/µL      Immature Grans Absolute 0 01 Thousand/uL      Lymphocytes Absolute 1 30 Thousands/µL      Monocytes Absolute 0 31 Thousand/µL      Eosinophils Absolute 0 00 Thousand/µL      Basophils Absolute 0 01 Thousands/µL                  XR chest 1 view portable   Final Result by Tashia Maya MD (07/25 7922)      Mild bibasilar ground glass opacity due to Covid 19 pneumonia  Workstation performed: [id number]                    Procedures  ECG 12 Lead Documentation Only    Date/Time: 7/25/2021 11:51 AM  Performed by: Soniya Duarte DO  Authorized by: Soniya Duarte DO     ECG reviewed by me, the ED Provider: yes    Patient location:  ED  Previous ECG:     Previous ECG:  Unavailable  Interpretation:     Interpretation: normal    Rate:     ECG rate:  73    ECG rate assessment: normal    Rhythm:     Rhythm: sinus rhythm    Ectopy:     Ectopy: none    QRS:     QRS axis:  Normal    QRS intervals:  Normal  Conduction:     Conduction: normal    ST segments:     ST segments:  Normal  T waves:     T waves: normal               ED Course  ED Course as of Jul 25 1621   Sun Jul 25, 2021   1149 Patient is ambulatory pulse ox was maintained between 96 and 98%  I did discuss essentially normal workup with patient as well as chest x-ray findings of COVID pneumonia  No indication for antibiotics at this time  Will prescribe high-dose vitamins and center home with her inhaler as well as recommending over-the-counter Tylenol, ibuprofen, Mucinex as needed    Discussed ED return parameters and patient does have a pulse ox at home and advised her to return immediately if it drops below 90% or her symptoms worsen especially her breathing  MDM  Number of Diagnoses or Management Options  Pneumonia due to COVID-19 virus  Diagnosis management comments: 70-year-old female with known COVID-19 infection diagnosed on 07/15, presents to the ED for worsening COVID-19 and flu-like symptoms as well as dyspnea  Patient reports hypoxia at home but currently is satting 99% on room air and not in any distress  Will workup with cardiac labs and chest x-ray  Will continue to monitor pulse ox while here and check ambulatory pulse ox prior to disposition decision  Will provide IV fluids, Zofran for nausea, albuterol inhaler for dyspnea and Mucinex for cough  Amount and/or Complexity of Data Reviewed  Clinical lab tests: ordered and reviewed  Tests in the radiology section of CPT®: ordered and reviewed  Tests in the medicine section of CPT®: ordered and reviewed  Review and summarize past medical records: yes  Independent visualization of images, tracings, or specimens: yes        Disposition  Final diagnoses:   Pneumonia due to COVID-19 virus     Time reflects when diagnosis was documented in both MDM as applicable and the Disposition within this note     Time User Action Codes Description Comment    7/25/2021 11:51 AM Sea MENJIVAR Add [U07 1,  J12 82] Pneumonia due to COVID-19 virus       ED Disposition     ED Disposition Condition Date/Time Comment    Discharge Stable Sun Jul 25, 2021 11:52 AM Mike Gomez discharge to home/self care              Follow-up Information     Follow up With Specialties Details Why Contact Info Additional [address], [address] Internal Medicine, Nurse Practitioner Schedule an appointment as soon as possible for a visit   [address] [facility]  [phone] [facility] Emergency Department Emergency Medicine Go to  If symptoms worsen Katy Holley [address] [facility]  [address] [facility] Emergency Department, [address] [facility], [facility], [address]          Discharge Medication List as of 7/25/2021 11:52 AM      START taking these medications    Details   Ascorbic Acid (vitamin C) 1000 MG tablet Take 1 tablet (1,000 mg total) by mouth every 12 (twelve) hours, Starting Sun 7/25/2021, Print      cholecalciferol (VITAMIN D3) 1,000 units tablet Take 2 tablets (2,000 Units total) by mouth daily, Starting Sun 7/25/2021, Print      Multiple Vitamin (multivitamin) tablet Take 1 tablet by mouth daily, Starting Sun 7/25/2021, Print      ondansetron (ZOFRAN-ODT) 4 mg disintegrating tablet Take 1 tablet (4 mg total) by mouth every 8 (eight) hours as needed for nausea or vomiting, Starting Sun 7/25/2021, Print         CONTINUE these medications which have NOT CHANGED    Details   ergocalciferol (VITAMIN D2) 50,000 units Take 1 capsule (50,000 Units total) by mouth once a week, Starting Wed 12/30/2020, Normal      esomeprazole (NexIUM) 40 MG capsule Take 1 capsule (40 mg total) by mouth 2 (two) times a day before meals, Starting Wed 3/31/2021, Until Sun 5/30/2021, Normal      hydrOXYzine pamoate (VISTARIL) 25 mg capsule take 1 capsule by mouth three times a day if needed, Normal      methylPREDNISolone 4 MG tablet therapy pack Use as directed on package, Normal           No discharge procedures on file      PDMP Review     None          ED Provider  Electronically Signed by           Keyla Choi DO  07/25/21 3622

## 2021-07-25 NOTE — DISCHARGE INSTRUCTIONS
COVID-19 (Coronavirus Disease 2019)   WHAT YOU NEED TO KNOW:   Coronavirus disease 2019 (COVID-19) is the disease caused by a coronavirus first discovered in December 2019  Coronaviruses generally cause upper respiratory (nose, throat, and lung) infections, such as a cold  The new virus spreads quickly and easily  The virus can be spread starting 2 days before symptoms even begin  The virus has also changed into several new forms (called variants) since it was discovered  The variants may be more contagious (easily spread) than the original form  Some may also cause more severe illness than others  It is important to follow local, national, and worldwide measures to protect yourself and others from infection  DISCHARGE INSTRUCTIONS:   If you think you or someone you know may be infected:  Do the following to protect others:  · If emergency care is needed,  tell the  about the possible infection, or call ahead and tell the emergency department  · Call a healthcare provider  for instructions if symptoms are mild  Anyone who may be infected should not  arrive without calling first  The provider will need to protect staff members and other patients  · The person who may be infected needs to wear a face covering  while getting medical care  This will help lower the risk of infecting others  Coverings are not used for anyone who is younger than 2 years, has breathing problems, or cannot remove it  The provider can give you instructions for anyone who cannot wear a covering  Call your local emergency number (911 in the [address] [place] [address],[address]) or an emergency department if:   · You have trouble breathing or shortness of breath at rest     · You have chest pain or pressure that lasts longer than 5 minutes  · You become confused or hard to wake  · Your lips or face are blue  · You have a fever of 104°F (40°C) or higher      Call your doctor if:   · You do not  have symptoms of COVID-19 but had close physical contact within 14 days with someone who tested positive  · You have questions or concerns about your condition or care  Medicines: You may need any of the following:  · Decongestants  help reduce nasal congestion and help you breathe more easily  If you take decongestant pills, they may make you feel restless or cause problems with your sleep  Do not use decongestant sprays for more than a few days  · Cough suppressants  help reduce coughing  Ask your healthcare provider which type of cough medicine is best for you  · Acetaminophen  decreases pain and fever  It is available without a doctor's order  Ask how much to take and how often to take it  Follow directions  Read the labels of all other medicines you are using to see if they also contain acetaminophen, or ask your doctor or pharmacist  Acetaminophen can cause liver damage if not taken correctly  Do not use more than 4 grams (4,000 milligrams) total of acetaminophen in one day  · NSAIDs , such as ibuprofen, help decrease swelling, pain, and fever  NSAIDs can cause stomach bleeding or kidney problems in certain people  If you take blood thinner medicine, always ask your healthcare provider if NSAIDs are safe for you  Always read the medicine label and follow directions  · Take your medicine as directed  Contact your healthcare provider if you think your medicine is not helping or if you have side effects  Tell him or her if you are allergic to any medicine  Keep a list of the medicines, vitamins, and herbs you take  Include the amounts, and when and why you take them  Bring the list or the pill bottles to follow-up visits  Carry your medicine list with you in case of an emergency  How the 2019 coronavirus spreads: The following are ways the virus is thought to spread, but more information may be coming:  · Droplets are the main way all coronaviruses spread  The virus travels in droplets that form when a person talks, coughs, or sneezes  The droplets can also float in the air for minutes or hours  Infection happens when you breathe in the droplets or get them in your eyes or nose  Close personal contact with an infected person increases your risk for infection  This means being within 6 feet (2 meters) of the person for at least 15 minutes over 24 hours  · Person-to-person contact can spread the virus  For example, a person with the virus on his or her hands can spread it by shaking hands with someone  · The virus can stay on objects and surfaces for a short time  You may become infected by touching the object or surface and then touching your eyes or mouth  · An infected animal may be able to infect a person who touches it  This may happen at live markets or on a farm  Help lower the risk for COVID-19:  The best way to prevent infection is to avoid anyone who is infected, but this can be hard to do  An infected person can spread the virus before signs or symptoms begin, or even if signs or symptoms never develop  The following can help lower the risk for infection:      · Wash your hands often throughout the day  Use soap and water  Rub your soapy hands together, lacing your fingers, for at least 20 seconds  Rinse with warm, running water  Dry your hands with a clean towel or paper towel  Use hand  that contains alcohol if soap and water are not available  Teach children how to wash their hands and use hand   · Cover sneezes and coughs  Turn your face away and cover your mouth and nose with a tissue  Throw the tissue away  Use the bend of your arm if a tissue is not available  Then wash your hands well with soap and water or use hand   Teach children how to cover a cough or sneeze  · Wear a face covering (mask) around anyone who does not live in your home  Use a cloth covering with at least 2 layers  You can also create layers by putting a cloth covering over a disposable non-medical mask  Cover your mouth and your nose  The covering should fit snugly against the bridge of your nose  Securely fasten it under your chin and on the sides of your face  Do not  wear a plastic face shield instead of a covering  Continue social distancing and washing your hands often  A face covering is not a substitute for social distancing safety measures  · Follow worldwide, national, and local social distancing guidelines  Keep at least 6 feet (2 meters) between you and others  Also keep this distance from anyone who comes to your home, such as someone making a delivery  Wear a face covering while you are around others  You will need to wear a covering in restaurants, stores, and other public buildings  You will also need a covering on mass transit, such as a bus, subway, or airplane  Remember to use a covering made from thick material or wear 2 coverings together  · Make a habit of not touching your face  If you get the virus on your hands, you can transfer it to your eyes, nose, or mouth and become infected  You can also transfer it to objects, surfaces, or people  Do not touch your eyes, nose, or mouth without washing your hands first     · Clean and disinfect high-touch surfaces and objects often  Use disinfecting wipes, or make a solution of 4 teaspoons of bleach in 1 quart (4 cups) of water  Clean and disinfect even if you think no one living in or coming to your home is infected with the virus  · Ask about vaccines you may need  Get a COVID-19 vaccine when it is available to you  The current vaccines are given as a shot in 1 or 2 doses  Get the influenza (flu) vaccine as soon as recommended each year, usually starting in September or October  Get the pneumonia vaccine if recommended  Follow social distancing guidelines:  National and local social distancing rules vary  Rules may change over time as restrictions are lifted  Restrictions may return if an outbreak happens where you live   It is employer's rules so everyone stays safe  If you have COVID-19 and are recovering at home,  healthcare providers will give you specific instructions to follow  The following are general guidelines to remind you how to keep others safe until you are well:  · Wash your hands often  Use soap and water as much as possible  Use hand  that contains alcohol if soap and water are not available  Dry your hands with a clean towel or paper towel  Do not share towels with anyone  If you use paper towels, throw them away in a lined trash can kept in your room or area  Use a covered trash can, if possible  · Do not go out of your home unless it is necessary  Ask someone who is not infected to go out for groceries or supplies, or have them delivered  Do not go to your healthcare provider's office without an appointment  · Only have close physical contact with a person giving direct care, or a baby or child you must care for  Family members and friends should not visit you  If possible, stay in a separate area or room of your home if you live with others  No one should go into the area or room except to give you care  You can visit with others by phone, video chat, e-mail, or similar systems  · Wear a face covering while others are near you  This can help prevent droplets from spreading the virus when you talk, sneeze, or cough  Put the covering on before anyone comes into your room or area  Remind the person to cover his or her nose and mouth before coming in to provide care for you  · Do not share items  Do not share dishes, towels, or other items with anyone  Items need to be washed after you use them  · Protect your baby  Some newborns have tested positive for the virus  It is not known if they became infected before or after birth  The highest risk is when a  has close contact with an infected person   If you are pregnant or breastfeeding, talk to your healthcare provider or obstetrician about any concerns you have  He or she will tell you when to bring your baby in for check-ups and vaccines  He or she will also tell you what to do if you think your baby was infected with the coronavirus  Wash your hands and put on a clean face covering before you breastfeed or care for your baby  · Do not handle live animals unless it is necessary  Some animals, including pets, have been infected with the new coronavirus  Ask someone who is not infected to take care of your pet until you are well  If you must care for a pet, wear a face covering  Wash your hands before and after you give care  Talk to your healthcare provider about how to keep a service animal safe, if needed  · Follow directions from your healthcare provider for being around others after you recover  It is not known if or for how long a recovered person can pass the virus to others  Your provider may give you instructions, such as continuing social distancing and wearing a face covering  He or she will tell you when it is okay to be around others again  This may be 10 to 20 days after symptoms started or you had a positive test  Most symptoms will also need to be gone  Your provider will give you more information  Follow up with your doctor as directed:  Write down your questions so you remember to ask them during your visits  For more information:   · Centers for Disease Control and Prevention  [address] , [address]  Phone: 7- 183 - [phone]  Web Address: [employer]    © [facility] 2021 Information is for End User's use only and may not be sold, redistributed or otherwise used for commercial purposes  All illustrations and images included in CareNotes® are the copyrighted property of [employer]  or [facility] [employer]   The above information is an  only  It is not intended as medical advice for individual conditions or treatments   Talk to your doctor, nurse or pharmacist before following any medical regimen to see if it is safe and effective for you  Viral Pneumonia   WHAT YOU NEED TO KNOW:   Viral pneumonia is a lung infection caused by a virus, such as influenza  You can get a viral infection by breathing in the virus or by touching something that has the virus on it  You can develop viral pneumonia if a virus in your body travels to your lungs  DISCHARGE INSTRUCTIONS:   Seek care immediately if:   · You have more trouble breathing or your breathing seems faster than normal      · Your lips or fingernails turn blue  · You are confused and cannot think clearly  · You are urinating less or not at all  Contact your healthcare provider if:   · Your symptoms are not getting better or are getting worse, even after treatment  · You have questions or concerns about your condition or care  Medicines: You may need any of the following:  · Antiviral medicine  is given to treat an infection caused by a virus  Antivirals work best if taken within 72 hours of infection  After 72 hours, the medicine can still help shorten the amount of time you have the virus, or reduce your symptoms  · Acetaminophen  decreases pain and fever  It is available without a doctor's order  Ask how much to take and how often to take it  Follow directions  Read the labels of all other medicines you are using to see if they also contain acetaminophen, or ask your doctor or pharmacist  Acetaminophen can cause liver damage if not taken correctly  Do not use more than 4 grams (4,000 milligrams) total of acetaminophen in one day  · NSAIDs , such as ibuprofen, help decrease swelling, pain, and fever  This medicine is available with or without a doctor's order  NSAIDs can cause stomach bleeding or kidney problems in certain people  If you take blood thinner medicine, always ask your healthcare provider if NSAIDs are safe for you  Always read the medicine label and follow directions      · Take your medicine as directed  Contact your healthcare provider if you think your medicine is not helping or if you have side effects  Tell him or her if you are allergic to any medicine  Keep a list of the medicines, vitamins, and herbs you take  Include the amounts, and when and why you take them  Bring the list or the pill bottles to follow-up visits  Carry your medicine list with you in case of an emergency  Manage your symptoms:   · Rest as needed  Rest often while you recover  Slowly start to do more each day  · Drink liquids as directed  Ask how much liquid to drink each day and which liquids are best for you  Liquids help thin your mucus, which may make it easier for you to cough it up  · Do not smoke  Avoid secondhand smoke  Smoking makes it harder for you to get better  Nicotine and other chemicals in cigarettes and cigars can cause lung damage  Ask your healthcare provider for information if you currently smoke and need help to quit  E-cigarettes or smokeless tobacco still contain nicotine  Talk to your healthcare provider before you use these products  · Use a cool mist humidifier  A humidifier will help increase air moisture in your home  This may make it easier for you to breathe and help decrease your cough  · Keep your head elevated  You may be able to breathe better if you lie down with the head of your bed up  Prevent viral pneumonia:   · Prevent the spread of germs  Wash your hands often with soap and water  Use gel hand cleanser when there is no soap and water available  Do not touch your eyes, nose, or mouth unless you have washed your hands first  Cover your mouth when you cough  Cough into a tissue or your shirtsleeve so you do not spread germs from your hands  If you are sick, stay away from others as much as possible  · Ask about vaccines  You may need a vaccine to help prevent pneumonia   Get an influenza (flu) vaccine every year as soon as it becomes available  Follow up with your healthcare provider as directed:  Write down your questions so you remember to ask them during your visits  © Copyright [employer] 2021 Information is for End User's use only and may not be sold, redistributed or otherwise used for commercial purposes  All illustrations and images included in CareNotes® are the copyrighted property of [employer]  or [employer]  The above information is an  only  It is not intended as medical advice for individual conditions or treatments  Talk to your doctor, nurse or pharmacist before following any medical regimen to see if it is safe and effective for you

## 2021-07-26 ENCOUNTER — HOSPITAL ENCOUNTER (INPATIENT)
Facility: HOSPITAL | Age: 36
LOS: 4 days | Discharge: HOME | DRG: 137 | End: 2021-07-30
Attending: EMERGENCY MEDICINE | Admitting: INTERNAL MEDICINE
Payer: COMMERCIAL

## 2021-07-26 ENCOUNTER — APPOINTMENT (EMERGENCY)
Dept: RADIOLOGY | Facility: HOSPITAL | Age: 36
DRG: 137 | End: 2021-07-26
Payer: COMMERCIAL

## 2021-07-26 DIAGNOSIS — F17.200 SMOKING: ICD-10-CM

## 2021-07-26 DIAGNOSIS — J12.82 PNEUMONIA DUE TO COVID-19 VIRUS: Primary | ICD-10-CM

## 2021-07-26 DIAGNOSIS — U07.1 PNEUMONIA DUE TO COVID-19 VIRUS: Primary | ICD-10-CM

## 2021-07-26 DIAGNOSIS — R09.02 HYPOXIA: ICD-10-CM

## 2021-07-26 PROBLEM — J96.01 ACUTE RESPIRATORY FAILURE WITH HYPOXEMIA (HCC): Status: ACTIVE | Noted: 2021-07-26

## 2021-07-26 LAB
ALBUMIN SERPL BCP-MCNC: 3 G/DL (ref 3.5–5)
ALP SERPL-CCNC: 63 U/L (ref 46–116)
ALT SERPL W P-5'-P-CCNC: 100 U/L (ref 12–78)
ANION GAP SERPL CALCULATED.3IONS-SCNC: 10 MMOL/L (ref 4–13)
APTT PPP: 28 SECONDS (ref 23–37)
AST SERPL W P-5'-P-CCNC: 59 U/L (ref 5–45)
BASOPHILS # BLD MANUAL: 0 THOUSAND/UL (ref 0–0.1)
BASOPHILS NFR MAR MANUAL: 0 % (ref 0–1)
BILIRUB SERPL-MCNC: 0.21 MG/DL (ref 0.2–1)
BUN SERPL-MCNC: 9 MG/DL (ref 5–25)
CALCIUM ALBUM COR SERPL-MCNC: 9.1 MG/DL (ref 8.3–10.1)
CALCIUM SERPL-MCNC: 8.3 MG/DL (ref 8.3–10.1)
CHLORIDE SERPL-SCNC: 104 MMOL/L (ref 100–108)
CO2 SERPL-SCNC: 25 MMOL/L (ref 21–32)
CREAT SERPL-MCNC: 0.83 MG/DL (ref 0.6–1.3)
CRP SERPL QL: 51.8 MG/L
D DIMER PPP FEU-MCNC: 0.62 UG/ML FEU
EOSINOPHIL # BLD MANUAL: 0 THOUSAND/UL (ref 0–0.4)
EOSINOPHIL NFR BLD MANUAL: 0 % (ref 0–6)
ERYTHROCYTE [DISTWIDTH] IN BLOOD BY AUTOMATED COUNT: 13.7 % (ref 11.6–15.1)
FERRITIN SERPL-MCNC: 202 NG/ML (ref 8–388)
FIBRINOGEN PPP-MCNC: 385 MG/DL (ref 227–495)
GFR SERPL CREATININE-BSD FRML MDRD: 91 ML/MIN/1.73SQ M
GLUCOSE SERPL-MCNC: 104 MG/DL (ref 65–140)
HCT VFR BLD AUTO: 41 % (ref 34.8–46.1)
HGB BLD-MCNC: 13.2 G/DL (ref 11.5–15.4)
INR PPP: 0.96 (ref 0.84–1.19)
LACTATE SERPL-SCNC: 1.2 MMOL/L (ref 0.5–2)
LDH SERPL-CCNC: 267 U/L (ref 81–234)
LYMPHOCYTES # BLD AUTO: 1.49 THOUSAND/UL (ref 0.6–4.47)
LYMPHOCYTES # BLD AUTO: 39 % (ref 14–44)
MCH RBC QN AUTO: 28 PG (ref 26.8–34.3)
MCHC RBC AUTO-ENTMCNC: 32.2 G/DL (ref 31.4–37.4)
MCV RBC AUTO: 87 FL (ref 82–98)
MONOCYTES # BLD AUTO: 0.34 THOUSAND/UL (ref 0–1.22)
MONOCYTES NFR BLD: 9 % (ref 4–12)
NEUTROPHILS # BLD MANUAL: 1.98 THOUSAND/UL (ref 1.85–7.62)
NEUTS BAND NFR BLD MANUAL: 12 % (ref 0–8)
NEUTS SEG NFR BLD AUTO: 40 % (ref 43–75)
NT-PROBNP SERPL-MCNC: 138 PG/ML
PLATELET # BLD AUTO: 223 THOUSANDS/UL (ref 149–390)
PLATELET BLD QL SMEAR: ADEQUATE
PMV BLD AUTO: 9.3 FL (ref 8.9–12.7)
POTASSIUM SERPL-SCNC: 3.3 MMOL/L (ref 3.5–5.3)
PROCALCITONIN SERPL-MCNC: <0.05 NG/ML
PROT SERPL-MCNC: 6.9 G/DL (ref 6.4–8.2)
PROTHROMBIN TIME: 12.3 SECONDS (ref 11.6–14.5)
RBC # BLD AUTO: 4.72 MILLION/UL (ref 3.81–5.12)
SODIUM SERPL-SCNC: 139 MMOL/L (ref 136–145)
TOTAL CELLS COUNTED SPEC: 100
TROPONIN I SERPL-MCNC: <0.02 NG/ML
WBC # BLD AUTO: 3.81 THOUSAND/UL (ref 4.31–10.16)

## 2021-07-26 PROCEDURE — 85730 THROMBOPLASTIN TIME PARTIAL: CPT | Performed by: EMERGENCY MEDICINE

## 2021-07-26 PROCEDURE — 85610 PROTHROMBIN TIME: CPT | Performed by: EMERGENCY MEDICINE

## 2021-07-26 PROCEDURE — 82728 ASSAY OF FERRITIN: CPT | Performed by: EMERGENCY MEDICINE

## 2021-07-26 PROCEDURE — 99285 EMERGENCY DEPT VISIT HI MDM: CPT

## 2021-07-26 PROCEDURE — 85027 COMPLETE CBC AUTOMATED: CPT | Performed by: EMERGENCY MEDICINE

## 2021-07-26 PROCEDURE — 83880 ASSAY OF NATRIURETIC PEPTIDE: CPT | Performed by: EMERGENCY MEDICINE

## 2021-07-26 PROCEDURE — 83520 IMMUNOASSAY QUANT NOS NONAB: CPT | Performed by: EMERGENCY MEDICINE

## 2021-07-26 PROCEDURE — 84484 ASSAY OF TROPONIN QUANT: CPT | Performed by: EMERGENCY MEDICINE

## 2021-07-26 PROCEDURE — 85007 BL SMEAR W/DIFF WBC COUNT: CPT | Performed by: EMERGENCY MEDICINE

## 2021-07-26 PROCEDURE — 36415 COLL VENOUS BLD VENIPUNCTURE: CPT | Performed by: EMERGENCY MEDICINE

## 2021-07-26 PROCEDURE — 83615 LACTATE (LD) (LDH) ENZYME: CPT | Performed by: EMERGENCY MEDICINE

## 2021-07-26 PROCEDURE — 93005 ELECTROCARDIOGRAM TRACING: CPT

## 2021-07-26 PROCEDURE — 84145 PROCALCITONIN (PCT): CPT | Performed by: EMERGENCY MEDICINE

## 2021-07-26 PROCEDURE — 80053 COMPREHEN METABOLIC PANEL: CPT | Performed by: EMERGENCY MEDICINE

## 2021-07-26 PROCEDURE — 85384 FIBRINOGEN ACTIVITY: CPT | Performed by: EMERGENCY MEDICINE

## 2021-07-26 PROCEDURE — 85379 FIBRIN DEGRADATION QUANT: CPT | Performed by: EMERGENCY MEDICINE

## 2021-07-26 PROCEDURE — 99285 EMERGENCY DEPT VISIT HI MDM: CPT | Performed by: EMERGENCY MEDICINE

## 2021-07-26 PROCEDURE — XW033E5 INTRODUCTION OF REMDESIVIR ANTI-INFECTIVE INTO PERIPHERAL VEIN, PERCUTANEOUS APPROACH, NEW TECHNOLOGY GROUP 5: ICD-10-PCS | Performed by: EMERGENCY MEDICINE

## 2021-07-26 PROCEDURE — 99406 BEHAV CHNG SMOKING 3-10 MIN: CPT | Performed by: INTERNAL MEDICINE

## 2021-07-26 PROCEDURE — 96374 THER/PROPH/DIAG INJ IV PUSH: CPT

## 2021-07-26 PROCEDURE — 71045 X-RAY EXAM CHEST 1 VIEW: CPT

## 2021-07-26 PROCEDURE — 86140 C-REACTIVE PROTEIN: CPT | Performed by: EMERGENCY MEDICINE

## 2021-07-26 PROCEDURE — 99223 1ST HOSP IP/OBS HIGH 75: CPT | Performed by: INTERNAL MEDICINE

## 2021-07-26 PROCEDURE — 83605 ASSAY OF LACTIC ACID: CPT | Performed by: EMERGENCY MEDICINE

## 2021-07-26 RX ORDER — DEXAMETHASONE SODIUM PHOSPHATE 4 MG/ML
6 INJECTION, SOLUTION INTRA-ARTICULAR; INTRALESIONAL; INTRAMUSCULAR; INTRAVENOUS; SOFT TISSUE EVERY 24 HOURS
Status: DISCONTINUED | OUTPATIENT
Start: 2021-07-27 | End: 2021-07-30 | Stop reason: HOSPADM

## 2021-07-26 RX ORDER — FAMOTIDINE 20 MG/1
20 TABLET, FILM COATED ORAL 2 TIMES DAILY
Status: DISCONTINUED | OUTPATIENT
Start: 2021-07-26 | End: 2021-07-30 | Stop reason: HOSPADM

## 2021-07-26 RX ORDER — NICOTINE 21 MG/24HR
1 PATCH, TRANSDERMAL 24 HOURS TRANSDERMAL DAILY
Status: DISCONTINUED | OUTPATIENT
Start: 2021-07-27 | End: 2021-07-30 | Stop reason: HOSPADM

## 2021-07-26 RX ORDER — ASCORBIC ACID 500 MG
1000 TABLET ORAL EVERY 12 HOURS SCHEDULED
Status: DISCONTINUED | OUTPATIENT
Start: 2021-07-26 | End: 2021-07-30 | Stop reason: HOSPADM

## 2021-07-26 RX ORDER — ACETAMINOPHEN 325 MG/1
650 TABLET ORAL EVERY 6 HOURS PRN
Status: DISCONTINUED | OUTPATIENT
Start: 2021-07-26 | End: 2021-07-27

## 2021-07-26 RX ORDER — MULTIVITAMIN/IRON/FOLIC ACID 18MG-0.4MG
1 TABLET ORAL DAILY
Status: DISCONTINUED | OUTPATIENT
Start: 2021-08-03 | End: 2021-07-30 | Stop reason: HOSPADM

## 2021-07-26 RX ORDER — ZINC SULFATE 50(220)MG
220 CAPSULE ORAL DAILY
Status: DISCONTINUED | OUTPATIENT
Start: 2021-07-27 | End: 2021-07-30 | Stop reason: HOSPADM

## 2021-07-26 RX ORDER — ONDANSETRON 2 MG/ML
4 INJECTION INTRAMUSCULAR; INTRAVENOUS EVERY 6 HOURS PRN
Status: DISCONTINUED | OUTPATIENT
Start: 2021-07-26 | End: 2021-07-30 | Stop reason: HOSPADM

## 2021-07-26 RX ORDER — MELATONIN
2000 DAILY
Status: DISCONTINUED | OUTPATIENT
Start: 2021-07-27 | End: 2021-07-30 | Stop reason: HOSPADM

## 2021-07-26 RX ORDER — DOXYCYCLINE HYCLATE 100 MG/1
100 CAPSULE ORAL EVERY 12 HOURS
Status: DISCONTINUED | OUTPATIENT
Start: 2021-07-26 | End: 2021-07-27

## 2021-07-26 RX ORDER — DEXAMETHASONE SODIUM PHOSPHATE 10 MG/ML
10 INJECTION, SOLUTION INTRAMUSCULAR; INTRAVENOUS ONCE
Status: COMPLETED | OUTPATIENT
Start: 2021-07-26 | End: 2021-07-26

## 2021-07-26 RX ADMIN — OXYCODONE HYDROCHLORIDE AND ACETAMINOPHEN 1000 MG: 500 TABLET ORAL at 20:51

## 2021-07-26 RX ADMIN — FAMOTIDINE 20 MG: 20 TABLET ORAL at 17:56

## 2021-07-26 RX ADMIN — DOXYCYCLINE 100 MG: 100 CAPSULE ORAL at 17:56

## 2021-07-26 RX ADMIN — DEXAMETHASONE SODIUM PHOSPHATE 10 MG: 10 INJECTION, SOLUTION INTRAMUSCULAR; INTRAVENOUS at 15:19

## 2021-07-26 RX ADMIN — REMDESIVIR 200 MG: 100 INJECTION, POWDER, LYOPHILIZED, FOR SOLUTION INTRAVENOUS at 16:17

## 2021-07-26 RX ADMIN — CEFTRIAXONE SODIUM 1000 MG: 10 INJECTION, POWDER, FOR SOLUTION INTRAVENOUS at 20:52

## 2021-07-26 NOTE — H&P
[facility]  H&P- Lanny Lovett 1985, 39 y o  female MRN: [id number]  Unit/Bed#: ED 29 Encounter: [id number]  Primary Care Provider: PILAR Collado   Date and time admitted to hospital: 7/26/2021  2:37 PM    * COVID-19  Assessment & Plan  Presented with worsening fatigue, cough, shortness of breath, chills  Risk factors include obesity  COVID19 PCR positive  CXR suggestive of pneumonia  Requiring oxygen  Was saturating as low as 80s in the ED as per staff  Plan:  Start patient COVID-19 treatment vitamin-C, D, zinc  Start remdesivir  Start dexamethasone  Start ceftriaxone doxycycline, check procalcitonin  Self prone as tolerated  Provide oxygen supplementation and wean as tolerated  Monitor clinically, temperature curve    As per patient's request I have discussed this with   He is also in the hospital with his on issues unfortunately  All questions answered    Acute respiratory failure with hypoxemia [facility])  Assessment & Plan  Due to COVID-19  See plan under COVID-19  Anxiety  Assessment & Plan  In the past she was on hydroxyzine but currently not taking  This appears stable  Obesity  Assessment & Plan  She would benefit from weight loss after resolution of acute illness    Gastroesophageal reflux disease without esophagitis  Assessment & Plan  As an outpatient on Nexium  Continue famotidine while here  VTE Prophylaxis: Enoxaparin (Lovenox)  / sequential compression device and foot pump applied   Code Status:  Full code  POLST: POLST form is not discussed and not completed at this time  Discussion with family:  Discussed with  as per patient request    Anticipated Length of Stay:  Patient will be admitted on an Inpatient basis with an anticipated length of stay of  at least 2 midnights     Justification for Hospital Stay:  COVID-19 pneumonia, acute hypoxemic respiratory failure    Total Time for Visit, including Counseling / Coordination of Care: 45 minutes  Greater than 50% of this total time spent on direct patient counseling and coordination of care  Chief Complaint:   Fatigue, chills, coughing shortness of breath    History of Present Illness:    Nestor Villareal is a 39 y o  female who presents with fatigue chills cough and shortness of breath  Patient states that she has been feeling extremely weak and fatigued for the past 10 days  Also has some nausea  She also mentions significant chills and fatigue  Recently she started to feel short of breath as well  She mentions a dry cough  Because of since she decided to come to the ED  At the ED she was noted to be saturating as low as 80s on ambulation  Blood work did reveal potassium 3 3  Elevated LDH  Elevated CRP  D-dimer 0 6  COVID-19 PCR was positive  Chest x-ray did reveal right lower lobe opacity indicative of pneumonia    Review of Systems:    Review of Systems   Constitutional: Positive for fatigue  All other systems reviewed and are negative  More than 10 systems reviewed and negative except for above    Past Medical and Surgical History:     Past Medical History:   Diagnosis Date    Acid reflux     GERD (gastroesophageal reflux disease)     Hyperlipidemia        Past Surgical History:   Procedure Laterality Date     SECTION      DILATION AND CURETTAGE OF UTERUS      2x    TONSILECTOMY AND ADNOIDECTOMY      TONSILLECTOMY      TUBAL LIGATION         Meds/Allergies:    Prior to Admission medications    Medication Sig Start Date End Date Taking?  Authorizing Provider   Ascorbic Acid (vitamin C) 1000 MG tablet Take 1 tablet (1,000 mg total) by mouth every 12 (twelve) hours 21   Nicolasa Arevalo DO   cholecalciferol (VITAMIN D3) 1,000 units tablet Take 2 tablets (2,000 Units total) by mouth daily 21   Nicolasa Arevalo DO   ergocalciferol (VITAMIN D2) 50,000 units Take 1 capsule (50,000 Units total) by mouth once a week  Patient not taking: Reported on 2021 12/30/20   PILAR Flannery   esomeprazole (NexIUM) 40 MG capsule Take 1 capsule (40 mg total) by mouth 2 (two) times a day before meals 3/31/21 5/30/21  Oscar Palmer PA-C   hydrOXYzine pamoate (VISTARIL) 25 mg capsule take 1 capsule by mouth three times a day if needed  Patient not taking: Reported on 3/31/2021 2/21/21   PILAR Flannery   methylPREDNISolone 4 MG tablet therapy pack Use as directed on package 6/2/21   PILAR Flannery   Multiple Vitamin (multivitamin) tablet Take 1 tablet by mouth daily 7/25/21   Maribel Rogers DO   ondansetron (ZOFRAN-ODT) 4 mg disintegrating tablet Take 1 tablet (4 mg total) by mouth every 8 (eight) hours as needed for nausea or vomiting 7/25/21   Maribel Rogers DO     I have reviewed home medications with patient personally  Allergies: Allergies   Allergen Reactions    Reglan [Metoclopramide]        Social History:     Marital Status: /Civil Union     Substance Use History:   Social History     Substance and Sexual Activity   Alcohol Use Yes    Alcohol/week: 2 0 standard drinks    Types: 2 Glasses of wine per week    Comment: weekends mostly     Social History     Tobacco Use   Smoking Status Current Every Day Smoker    Packs/day: 0 25   Smokeless Tobacco Never Used     Social History     Substance and Sexual Activity   Drug Use Never       Family History:    Family History   Problem Relation Age of Onset    Heart disease Mother     No Known Problems Father        Physical Exam:     Vitals:   Blood Pressure: 132/81 (07/26/21 1432)  Pulse: 90 (07/26/21 1432)  Temperature: 98 2 °F (36 8 °C) (07/26/21 1432)  Temp Source: Tympanic (07/26/21 1432)  Respirations: 20 (07/26/21 1432)  SpO2: 98 % (07/26/21 1432)    Physical Exam  Vitals and nursing note reviewed  Constitutional:       Appearance: Normal appearance  She is obese  She is ill-appearing        Comments: Young female in bed, awake, on oxygen cannula   HENT:      Head: Normocephalic and atraumatic  Right Ear: External ear normal       Left Ear: External ear normal       Nose: Nose normal  No congestion  Mouth/Throat:      Mouth: Mucous membranes are moist       Pharynx: Oropharynx is clear  No oropharyngeal exudate or posterior oropharyngeal erythema  Eyes:      General: No scleral icterus  Right eye: No discharge  Left eye: No discharge  Extraocular Movements: Extraocular movements intact  Conjunctiva/sclera: Conjunctivae normal       Pupils: Pupils are equal, round, and reactive to light  Cardiovascular:      Rate and Rhythm: Normal rate and regular rhythm  Pulses: Normal pulses  Heart sounds: Normal heart sounds  No murmur heard  No friction rub  No gallop  Pulmonary:      Effort: Pulmonary effort is normal  No respiratory distress  Breath sounds: No stridor  Rales present  No wheezing or rhonchi  Comments: Increased work of breathing but no accessory muscle use, on oxygen cannula, breathing sounds present bilaterally bilateral crackles  No rhonchi or wheezing appreciated  Chest:      Chest wall: No tenderness  Abdominal:      General: Abdomen is flat  Bowel sounds are normal  There is no distension  Palpations: Abdomen is soft  There is no mass  Tenderness: There is no abdominal tenderness  There is no guarding or rebound  Musculoskeletal:         General: No swelling, tenderness, deformity or signs of injury  Normal range of motion  Cervical back: Normal range of motion and neck supple  No rigidity  No muscular tenderness  Skin:     General: Skin is warm and dry  Capillary Refill: Capillary refill takes less than 2 seconds  Coloration: Skin is not jaundiced or pale  Findings: No bruising, erythema, lesion or rash  Neurological:      General: No focal deficit present  Mental Status: She is alert and oriented to person, place, and time  Mental status is at baseline  Cranial Nerves:  No cranial nerve deficit  Sensory: No sensory deficit  Motor: No weakness  Coordination: Coordination normal    Psychiatric:         Mood and Affect: Mood normal          Behavior: Behavior normal          Thought Content: Thought content normal          Judgment: Judgment normal              Additional Data:     Lab Results: I have personally reviewed pertinent reports  Results from last 7 days   Lab Units 07/26/21  1517 07/25/21  0904   WBC Thousand/uL 3 81* 4 31   HEMOGLOBIN g/dL 13 2 13 6   HEMATOCRIT % 41 0 41 7   PLATELETS Thousands/uL 223 185   BANDS PCT % 12*  --    NEUTROS PCT %  --  63   LYMPHS PCT %  --  30   LYMPHO PCT % 39  --    MONOS PCT %  --  7   MONO PCT % 9  --    EOS PCT % 0 0     Results from last 7 days   Lab Units 07/26/21  1517   SODIUM mmol/L 139   POTASSIUM mmol/L 3 3*   CHLORIDE mmol/L 104   CO2 mmol/L 25   BUN mg/dL 9   CREATININE mg/dL 0 83   ANION GAP mmol/L 10   CALCIUM mg/dL 8 3   ALBUMIN g/dL 3 0*   TOTAL BILIRUBIN mg/dL 0 21   ALK PHOS U/L 63   ALT U/L 100*   AST U/L 59*   GLUCOSE RANDOM mg/dL 104     Results from last 7 days   Lab Units 07/26/21  1517   INR  0 96             Results from last 7 days   Lab Units 07/26/21  1517 07/25/21  0904   LACTIC ACID mmol/L 1 2 1 2       Imaging: I have personally reviewed pertinent reports  XR chest 1 view portable   ED Interpretation by Edilma Núñez MD (07/26 4583)   Right lower lobe infiltrate  No CHF      Final Result by Andrew Castillo MD (07/26 0706)      Right lower lobe opacity, indicative of pneumonia  In the setting of clinically suspected/proven COVID-19, this plain film appearance while nonspecific, can be seen in cases of viral pneumonia such as COVID-19  Workstation performed: [id number]                 [employer] / the[employer] Records Reviewed: Yes     ** Please Note: This note has been constructed using a voice recognition system   **

## 2021-07-26 NOTE — ED PROVIDER NOTES
History  Chief Complaint   Patient presents with    Shortness of Breath     Pt reports being seen here yesterday for same chief complaint  States she was on the phone with ID who "didn't like the way I sounded over the phone so told me to come back here to be admitted "     Patient is a 49-year-old female  She was seen here yesterday for COVID  She was exposed to a family member who tested positive for COVID at a party  She was tested on the 16th of July and was positive  She became symptomatic on the 17th  She has had fever, a productive cough, nasal congestion, and lightheadedness  Patient reports getting exertional sats in the [de-identified]  She is refer back to the emergency room for re-evaluation  Symptoms are moderately severe  Symptoms have been getting worse  Worse with exertion  Better with rest             Prior to Admission Medications   Prescriptions Last Dose Informant Patient Reported? Taking?    Ascorbic Acid (vitamin C) 1000 MG tablet   No No   Sig: Take 1 tablet (1,000 mg total) by mouth every 12 (twelve) hours   Multiple Vitamin (multivitamin) tablet   No No   Sig: Take 1 tablet by mouth daily   cholecalciferol (VITAMIN D3) 1,000 units tablet   No No   Sig: Take 2 tablets (2,000 Units total) by mouth daily   ergocalciferol (VITAMIN D2) 50,000 units   No No   Sig: Take 1 capsule (50,000 Units total) by mouth once a week   Patient not taking: Reported on 6/2/2021   esomeprazole (NexIUM) 40 MG capsule   No No   Sig: Take 1 capsule (40 mg total) by mouth 2 (two) times a day before meals   hydrOXYzine pamoate (VISTARIL) 25 mg capsule   No No   Sig: take 1 capsule by mouth three times a day if needed   Patient not taking: Reported on 3/31/2021   methylPREDNISolone 4 MG tablet therapy pack   No No   Sig: Use as directed on package   ondansetron (ZOFRAN-ODT) 4 mg disintegrating tablet   No No   Sig: Take 1 tablet (4 mg total) by mouth every 8 (eight) hours as needed for nausea or vomiting Facility-Administered Medications: None       Past Medical History:   Diagnosis Date    Acid reflux     GERD (gastroesophageal reflux disease)     Hyperlipidemia        Past Surgical History:   Procedure Laterality Date     SECTION      DILATION AND CURETTAGE OF UTERUS      2x    TONSILECTOMY AND ADNOIDECTOMY      TONSILLECTOMY      TUBAL LIGATION         Family History   Problem Relation Age of Onset    Heart disease Mother     No Known Problems Father      I have reviewed and agree with the history as documented  E-Cigarette/Vaping    E-Cigarette Use Never User      E-Cigarette/Vaping Substances     Social History     Tobacco Use    Smoking status: Current Every Day Smoker     Packs/day: 0 25    Smokeless tobacco: Never Used   Vaping Use    Vaping Use: Never used   Substance Use Topics    Alcohol use: Yes     Alcohol/week: 2 0 standard drinks     Types: 2 Glasses of wine per week     Comment: weekends mostly    Drug use: Never       Review of Systems   Constitutional: Positive for fever  Negative for unexpected weight change  HENT: Positive for congestion and rhinorrhea  Negative for sore throat  Eyes: Negative for pain, redness and visual disturbance  Respiratory: Positive for cough and shortness of breath  Cardiovascular: Negative for chest pain and leg swelling  Gastrointestinal: Negative for abdominal pain, diarrhea and vomiting  Endocrine: Negative for polydipsia and polyuria  Genitourinary: Negative for dysuria, frequency, hematuria, vaginal bleeding and vaginal discharge  Musculoskeletal: Negative for back pain and neck pain  Skin: Negative for rash and wound  Allergic/Immunologic: Negative for immunocompromised state  Neurological: Positive for light-headedness  Negative for weakness, numbness and headaches  Hematological: Does not bruise/bleed easily  Psychiatric/Behavioral: Negative for hallucinations and suicidal ideas     All other systems reviewed and are negative  Physical Exam  Physical Exam  Vitals reviewed  Constitutional:       Comments: Dyspneic   HENT:      Head: Normocephalic and atraumatic  Nose: Nose normal    Eyes:      General:         Right eye: No discharge  Left eye: No discharge  Conjunctiva/sclera: Conjunctivae normal    Cardiovascular:      Rate and Rhythm: Normal rate and regular rhythm  Pulses: Normal pulses  Heart sounds: Normal heart sounds  No murmur heard  No friction rub  No gallop  Pulmonary:      Effort: Tachypnea present  Breath sounds: Normal breath sounds  No stridor  No decreased breath sounds, wheezing, rhonchi or rales  Abdominal:      General: Bowel sounds are normal  There is no distension  Palpations: Abdomen is soft  Tenderness: There is no abdominal tenderness  There is no right CVA tenderness, left CVA tenderness, guarding or rebound  Musculoskeletal:         General: No swelling, tenderness, deformity or signs of injury  Normal range of motion  Cervical back: Normal range of motion and neck supple  No rigidity  Right lower leg: No edema  Left lower leg: No edema  Comments: No calf tenderness or unilateral leg swelling  Skin:     General: Skin is warm and dry  Coloration: Skin is not jaundiced  Findings: No rash  Neurological:      General: No focal deficit present  Mental Status: She is alert and oriented to person, place, and time  Sensory: No sensory deficit  Motor: Motor function is intact     Psychiatric:         Mood and Affect: Mood normal          Behavior: Behavior normal          Vital Signs  ED Triage Vitals [07/26/21 1432]   Temperature Pulse Respirations Blood Pressure SpO2   98 2 °F (36 8 °C) 90 20 132/81 98 %      Temp Source Heart Rate Source Patient Position - Orthostatic VS BP Location FiO2 (%)   Tympanic Monitor Sitting Left arm --      Pain Score       --           Vitals:    07/26/21 1432   BP: 132/81   Pulse: 90   Patient Position - Orthostatic VS: Sitting         Visual Acuity      ED Medications  Medications   acetaminophen (TYLENOL) tablet 650 mg (has no administration in time range)   nicotine (NICODERM CQ) 21 mg/24 hr TD 24 hr patch 1 patch (has no administration in time range)   enoxaparin (LOVENOX) subcutaneous injection 40 mg (has no administration in time range)   ondansetron (ZOFRAN) injection 4 mg (has no administration in time range)   cholecalciferol (VITAMIN D3) tablet 2,000 Units (has no administration in time range)   ascorbic acid (VITAMIN C) tablet 1,000 mg (has no administration in time range)   zinc sulfate (ZINCATE) capsule 220 mg (has no administration in time range)     Followed by   multivitamin-minerals (CENTRUM ADULTS) tablet 1 tablet (has no administration in time range)   famotidine (PEPCID) tablet 20 mg (20 mg Oral Given 7/26/21 1756)   remdesivir (Veklury) 200 mg in sodium chloride 0 9 % 250 mL IVPB (200 mg Intravenous Not Given 7/26/21 1657)     Followed by   remdesivir Marlou Hasten) 100 mg in sodium chloride 0 9 % 250 mL IVPB (has no administration in time range)   ceftriaxone (ROCEPHIN) 1 g/50 mL in dextrose IVPB (has no administration in time range)   doxycycline hyclate (VIBRAMYCIN) capsule 100 mg (100 mg Oral Given 7/26/21 1756)   dexamethasone (DECADRON) injection 6 mg (has no administration in time range)   dexamethasone (PF) (DECADRON) injection 10 mg (10 mg Intravenous Given 7/26/21 1519)   remdesivir (Veklury) 200 mg in sodium chloride 0 9 % 250 mL IVPB (0 mg Intravenous Stopped 7/26/21 1756)       Diagnostic Studies  Results Reviewed     Procedure Component Value Units Date/Time    LD,Blood [[id number]]  (Abnormal) Collected: 07/26/21 1517    Lab Status: Final result Specimen: Blood from Arm, Right Updated: 07/26/21 1622      U/L     C-reactive protein [[id number]]  (Abnormal) Collected: 07/26/21 1517    Lab Status: Final result Specimen: Blood from Arm, Right Updated: 07/26/21 1622     CRP 51 8 mg/L     NT-BNP PRO [[id number]]  (Abnormal) Collected: 07/26/21 1517    Lab Status: Final result Specimen: Blood from Arm, Right Updated: 07/26/21 1622     NT-proBNP 138 pg/mL     CBC and differential [[id number]]  (Abnormal) Collected: 07/26/21 1517    Lab Status: Final result Specimen: Blood from Arm, Right Updated: 07/26/21 1616     WBC 3 81 Thousand/uL      RBC 4 72 Million/uL      Hemoglobin 13 2 g/dL      Hematocrit 41 0 %      MCV 87 fL      MCH 28 0 pg      MCHC 32 2 g/dL      RDW 13 7 %      MPV 9 3 fL      Platelets 099 Thousands/uL     Narrative: This is an appended report  These results have been appended to a previously verified report  Manual Differential(PHLEBS Do Not Order) [[id number]]  (Abnormal) Collected: 07/26/21 1517    Lab Status: Final result Specimen: Blood from Arm, Right Updated: 07/26/21 1616     Segmented % 40 %      Bands % 12 %      Lymphocytes % 39 %      Monocytes % 9 %      Eosinophils, % 0 %      Basophils % 0 %      Absolute Neutrophils 1 98 Thousand/uL      Lymphocytes Absolute 1 49 Thousand/uL      Monocytes Absolute 0 34 Thousand/uL      Eosinophils Absolute 0 00 Thousand/uL      Basophils Absolute 0 00 Thousand/uL      Total Counted 100     Platelet Estimate Adequate    Narrative:      WBC has been corrected due to the presence of NRBC, please see adjusted WBC     Lactic acid [[id number]]  (Normal) Collected: 07/26/21 1517    Lab Status: Final result Specimen: Blood from Arm, Right Updated: 07/26/21 1559     LACTIC ACID 1 2 mmol/L     Narrative:      Result may be elevated if tourniquet was used during collection      D-dimer, quantitative [[id number]]  (Abnormal) Collected: 07/26/21 1517    Lab Status: Final result Specimen: Blood from Arm, Right Updated: 07/26/21 1551     D-Dimer, Quant 0 62 ug/ml FEU     Troponin I [[id number]]  (Normal) Collected: 07/26/21 1517    Lab Status: Final result Specimen: Blood from Arm, Right Updated: 07/26/21 1548     Troponin I <0 02 ng/mL     Comprehensive metabolic panel [[id number]]  (Abnormal) Collected: 07/26/21 1517    Lab Status: Final result Specimen: Blood from Arm, Right Updated: 07/26/21 1546     Sodium 139 mmol/L      Potassium 3 3 mmol/L      Chloride 104 mmol/L      CO2 25 mmol/L      ANION GAP 10 mmol/L      BUN 9 mg/dL      Creatinine 0 83 mg/dL      Glucose 104 mg/dL      Calcium 8 3 mg/dL      Corrected Calcium 9 1 mg/dL      AST 59 U/L       U/L      Alkaline Phosphatase 63 U/L      Total Protein 6 9 g/dL      Albumin 3 0 g/dL      Total Bilirubin 0 21 mg/dL      eGFR 91 ml/min/1 73sq m     Narrative:      [place] guidelines for Chronic Kidney Disease (CKD):     Stage 1 with normal or high GFR (GFR > 90 mL/min/1 73 square meters)    Stage 2 Mild CKD (GFR = 60-89 mL/min/1 73 square meters)    Stage 3A Moderate CKD (GFR = 45-59 mL/min/1 73 square meters)    Stage 3B Moderate CKD (GFR = 30-44 mL/min/1 73 square meters)    Stage 4 Severe CKD (GFR = 15-29 mL/min/1 73 square meters)    Stage 5 End Stage CKD (GFR <15 mL/min/1 73 square meters)  Note: GFR calculation is accurate only with a steady state creatinine    Protime-INR [[id number]]  (Normal) Collected: 07/26/21 1517    Lab Status: Final result Specimen: Blood from Arm, Right Updated: 07/26/21 1539     Protime 12 3 seconds      INR 0 96    APTT [[id number]]  (Normal) Collected: 07/26/21 1517    Lab Status: Final result Specimen: Blood from Arm, Right Updated: 07/26/21 1539     PTT 28 seconds     Interleukin-6,Serum [[id number]] Collected: 07/26/21 1517    Lab Status: In process Specimen: Blood from Arm, Right Updated: 07/26/21 1526    Fibrinogen [[id number]] Collected: 07/26/21 1517    Lab Status: In process Specimen: Blood from Arm, Right Updated: 07/26/21 1525    Ferritin [[id number]] Collected: 07/26/21 1517    Lab Status:  In process Specimen: Blood from Arm, Right Updated: 07/26/21 1525 Procalcitonin [[id number]] Collected: 07/26/21 1517    Lab Status: In process Specimen: Blood from Arm, Right Updated: 07/26/21 1525                 XR chest 1 view portable   ED Interpretation by Bo Hernandez MD (07/26 3197)   Right lower lobe infiltrate  No CHF      Final Result by Art Bryant MD (07/26 0035)      Right lower lobe opacity, indicative of pneumonia  In the setting of clinically suspected/proven COVID-19, this plain film appearance while nonspecific, can be seen in cases of viral pneumonia such as COVID-19  Workstation performed: [id number]                    Procedures  ECG 12 Lead Documentation Only    Date/Time: 7/26/2021 3:55 PM  Performed by: Bo Hernandez MD  Authorized by: Bo Hernandez MD     ECG reviewed by me, the ED Provider: yes    Patient location:  ED  Interpretation:     Interpretation: normal    Rate:     ECG rate assessment: normal    Rhythm:     Rhythm: sinus rhythm    Ectopy:     Ectopy: none    QRS:     QRS axis:  Normal  Conduction:     Conduction: normal    ST segments:     ST segments:  Normal  T waves:     T waves: normal               ED Course                             SBIRT 22yo+      Most Recent Value   SBIRT (24 yo +)   In order to provide better care to our patients, we are screening all of our patients for alcohol and drug use  Would it be okay to ask you these screening questions? No Filed at: 07/26/2021 1502                    MDM  Number of Diagnoses or Management Options  Diagnosis management comments: Patient was ambulated  It sats did drop into the low 80s  She was placed on oxygen  Steroids were ordered  Consulted with Medicine for admission         Amount and/or Complexity of Data Reviewed  Clinical lab tests: ordered and reviewed  Tests in the radiology section of CPT®: ordered and reviewed  Discuss the patient with other providers: yes  Independent visualization of images, tracings, or specimens: yes        Disposition  Final diagnoses:   Pneumonia due to COVID-19 virus   Hypoxia     Time reflects when diagnosis was documented in both MDM as applicable and the Disposition within this note     Time User Action Codes Description Comment    7/26/2021  4:10 PM Gris Hampton [U07 1,  J12 82] Pneumonia due to COVID-19 virus     7/26/2021  4:10 PM Gris Hampton [R09 02] Hypoxia       ED Disposition     ED Disposition Condition Date/Time Comment    Admit Stable Mon Jul 26, 2021  4:10 PM       Follow-up Information    None         Patient's Medications   Discharge Prescriptions    No medications on file     No discharge procedures on file      PDMP Review     None          ED Provider  Electronically Signed by           Tracee Llamas MD  07/26/21 7847

## 2021-07-26 NOTE — ASSESSMENT & PLAN NOTE
Presented with worsening fatigue, cough, shortness of breath, chills  Risk factors include obesity  COVID19 PCR positive  CXR suggestive of pneumonia  Requiring oxygen  Was saturating as low as 80s in the ED as per staff  Plan:  Start patient COVID-19 treatment vitamin-C, D, zinc  Start remdesivir  Start dexamethasone  Start ceftriaxone doxycycline, check procalcitonin  Self prone as tolerated  Provide oxygen supplementation and wean as tolerated  Monitor clinically, temperature curve    As per patient's request I have discussed this with   He is also in the hospital with his on issues unfortunately    All questions answered

## 2021-07-27 PROBLEM — R07.9 CHEST PAIN: Status: ACTIVE | Noted: 2021-07-27

## 2021-07-27 LAB
ALBUMIN SERPL BCP-MCNC: 3.1 G/DL (ref 3.5–5)
ALP SERPL-CCNC: 69 U/L (ref 46–116)
ALT SERPL W P-5'-P-CCNC: 95 U/L (ref 12–78)
ANION GAP SERPL CALCULATED.3IONS-SCNC: 12 MMOL/L (ref 4–13)
AST SERPL W P-5'-P-CCNC: 42 U/L (ref 5–45)
ATRIAL RATE: 67 BPM
ATRIAL RATE: 73 BPM
BILIRUB SERPL-MCNC: 0.22 MG/DL (ref 0.2–1)
BUN SERPL-MCNC: 12 MG/DL (ref 5–25)
CALCIUM ALBUM COR SERPL-MCNC: 9.6 MG/DL (ref 8.3–10.1)
CALCIUM SERPL-MCNC: 8.9 MG/DL (ref 8.3–10.1)
CHLORIDE SERPL-SCNC: 102 MMOL/L (ref 100–108)
CO2 SERPL-SCNC: 24 MMOL/L (ref 21–32)
CREAT SERPL-MCNC: 0.65 MG/DL (ref 0.6–1.3)
D DIMER PPP FEU-MCNC: 0.41 UG/ML FEU
ERYTHROCYTE [DISTWIDTH] IN BLOOD BY AUTOMATED COUNT: 13.1 % (ref 11.6–15.1)
FERRITIN SERPL-MCNC: 188 NG/ML (ref 8–388)
GFR SERPL CREATININE-BSD FRML MDRD: 115 ML/MIN/1.73SQ M
GLUCOSE SERPL-MCNC: 138 MG/DL (ref 65–140)
HCT VFR BLD AUTO: 39.1 % (ref 34.8–46.1)
HGB BLD-MCNC: 12.9 G/DL (ref 11.5–15.4)
MCH RBC QN AUTO: 28.2 PG (ref 26.8–34.3)
MCHC RBC AUTO-ENTMCNC: 33 G/DL (ref 31.4–37.4)
MCV RBC AUTO: 86 FL (ref 82–98)
P AXIS: 25 DEGREES
P AXIS: 36 DEGREES
PLATELET # BLD AUTO: 227 THOUSANDS/UL (ref 149–390)
PMV BLD AUTO: 9 FL (ref 8.9–12.7)
POTASSIUM SERPL-SCNC: 4 MMOL/L (ref 3.5–5.3)
PR INTERVAL: 144 MS
PR INTERVAL: 144 MS
PROCALCITONIN SERPL-MCNC: <0.05 NG/ML
PROT SERPL-MCNC: 7.5 G/DL (ref 6.4–8.2)
QRS AXIS: 47 DEGREES
QRS AXIS: 84 DEGREES
QRSD INTERVAL: 84 MS
QRSD INTERVAL: 86 MS
QT INTERVAL: 394 MS
QT INTERVAL: 400 MS
QTC INTERVAL: 422 MS
QTC INTERVAL: 434 MS
RBC # BLD AUTO: 4.57 MILLION/UL (ref 3.81–5.12)
SODIUM SERPL-SCNC: 138 MMOL/L (ref 136–145)
T WAVE AXIS: 28 DEGREES
T WAVE AXIS: 46 DEGREES
VENTRICULAR RATE: 67 BPM
VENTRICULAR RATE: 73 BPM
WBC # BLD AUTO: 2.68 THOUSAND/UL (ref 4.31–10.16)

## 2021-07-27 PROCEDURE — 93010 ELECTROCARDIOGRAM REPORT: CPT | Performed by: INTERNAL MEDICINE

## 2021-07-27 PROCEDURE — 97163 PT EVAL HIGH COMPLEX 45 MIN: CPT

## 2021-07-27 PROCEDURE — 84145 PROCALCITONIN (PCT): CPT | Performed by: EMERGENCY MEDICINE

## 2021-07-27 PROCEDURE — 82728 ASSAY OF FERRITIN: CPT | Performed by: INTERNAL MEDICINE

## 2021-07-27 PROCEDURE — 80053 COMPREHEN METABOLIC PANEL: CPT | Performed by: INTERNAL MEDICINE

## 2021-07-27 PROCEDURE — 99233 SBSQ HOSP IP/OBS HIGH 50: CPT | Performed by: PHYSICIAN ASSISTANT

## 2021-07-27 PROCEDURE — 85027 COMPLETE CBC AUTOMATED: CPT | Performed by: INTERNAL MEDICINE

## 2021-07-27 PROCEDURE — 85379 FIBRIN DEGRADATION QUANT: CPT | Performed by: INTERNAL MEDICINE

## 2021-07-27 RX ORDER — ALPRAZOLAM 0.25 MG/1
0.25 TABLET ORAL ONCE
Status: COMPLETED | OUTPATIENT
Start: 2021-07-27 | End: 2021-07-27

## 2021-07-27 RX ORDER — ALPRAZOLAM 0.25 MG/1
0.25 TABLET ORAL
Status: DISCONTINUED | OUTPATIENT
Start: 2021-07-27 | End: 2021-07-27

## 2021-07-27 RX ORDER — ZOLPIDEM TARTRATE 5 MG/1
5 TABLET ORAL
Status: DISCONTINUED | OUTPATIENT
Start: 2021-07-27 | End: 2021-07-30 | Stop reason: HOSPADM

## 2021-07-27 RX ORDER — PANTOPRAZOLE SODIUM 40 MG/1
40 TABLET, DELAYED RELEASE ORAL
Status: DISCONTINUED | OUTPATIENT
Start: 2021-07-27 | End: 2021-07-30 | Stop reason: HOSPADM

## 2021-07-27 RX ORDER — ACETAMINOPHEN 325 MG/1
975 TABLET ORAL EVERY 6 HOURS PRN
Status: DISCONTINUED | OUTPATIENT
Start: 2021-07-27 | End: 2021-07-30 | Stop reason: HOSPADM

## 2021-07-27 RX ORDER — SODIUM CHLORIDE 9 MG/ML
75 INJECTION, SOLUTION INTRAVENOUS CONTINUOUS
Status: DISCONTINUED | OUTPATIENT
Start: 2021-07-27 | End: 2021-07-28

## 2021-07-27 RX ORDER — BENZONATATE 100 MG/1
100 CAPSULE ORAL 3 TIMES DAILY PRN
Status: DISCONTINUED | OUTPATIENT
Start: 2021-07-27 | End: 2021-07-30 | Stop reason: HOSPADM

## 2021-07-27 RX ORDER — SODIUM CHLORIDE FOR INHALATION 0.9 %
3 VIAL, NEBULIZER (ML) INHALATION ONCE
Status: DISCONTINUED | OUTPATIENT
Start: 2021-07-27 | End: 2021-07-30 | Stop reason: HOSPADM

## 2021-07-27 RX ADMIN — ENOXAPARIN SODIUM 40 MG: 40 INJECTION SUBCUTANEOUS at 09:16

## 2021-07-27 RX ADMIN — ONDANSETRON 4 MG: 2 INJECTION INTRAMUSCULAR; INTRAVENOUS at 09:09

## 2021-07-27 RX ADMIN — ACETAMINOPHEN 975 MG: 325 TABLET, FILM COATED ORAL at 12:32

## 2021-07-27 RX ADMIN — OXYCODONE HYDROCHLORIDE AND ACETAMINOPHEN 1000 MG: 500 TABLET ORAL at 20:57

## 2021-07-27 RX ADMIN — Medication 2000 UNITS: at 09:12

## 2021-07-27 RX ADMIN — DEXAMETHASONE SODIUM PHOSPHATE 6 MG: 4 INJECTION INTRA-ARTICULAR; INTRALESIONAL; INTRAMUSCULAR; INTRAVENOUS; SOFT TISSUE at 17:52

## 2021-07-27 RX ADMIN — REMDESIVIR 100 MG: 100 INJECTION, POWDER, LYOPHILIZED, FOR SOLUTION INTRAVENOUS at 17:56

## 2021-07-27 RX ADMIN — FAMOTIDINE 20 MG: 20 TABLET ORAL at 17:51

## 2021-07-27 RX ADMIN — FAMOTIDINE 20 MG: 20 TABLET ORAL at 09:12

## 2021-07-27 RX ADMIN — DOXYCYCLINE 100 MG: 100 CAPSULE ORAL at 06:22

## 2021-07-27 RX ADMIN — SODIUM CHLORIDE 75 ML/HR: 0.9 INJECTION, SOLUTION INTRAVENOUS at 12:33

## 2021-07-27 RX ADMIN — ZOLPIDEM TARTRATE 5 MG: 5 TABLET, COATED ORAL at 21:12

## 2021-07-27 RX ADMIN — PANTOPRAZOLE SODIUM 40 MG: 40 TABLET, DELAYED RELEASE ORAL at 06:22

## 2021-07-27 RX ADMIN — BENZONATATE 100 MG: 100 CAPSULE ORAL at 20:57

## 2021-07-27 RX ADMIN — ZINC SULFATE 220 MG (50 MG) CAPSULE 220 MG: CAPSULE at 09:12

## 2021-07-27 RX ADMIN — OXYCODONE HYDROCHLORIDE AND ACETAMINOPHEN 1000 MG: 500 TABLET ORAL at 09:12

## 2021-07-27 RX ADMIN — ALPRAZOLAM 0.25 MG: 0.25 TABLET ORAL at 12:32

## 2021-07-27 NOTE — CASE MANAGEMENT
LOS1  Pt is not a bundle  Pt is not a readmission  Green readmission score of 10=lowest risk for readmission    TC to patient's room due to COVID dx but no answer  TC to patient's emergency contact/mother, Kaylie Knight [phone] to discuss CM role and DCP for dtr  Pt lives in split level home with 5STE and 5 steps to main level  At baseline, pt is independent  She is mother of 3 children under the age of 15 and works FT  Her spouse is presently hospitalized and is terminally ill  No DME  No prior homecare or inpatient reahab  Pt has h/o treatment for depression from her PCP after spouse's diagnosis but mother unsure if presently being treated  No anti-depressants noted in [facility]  Denies h/o substance abuse treatment  PCP: [address]: [facility]  Pt has LW/POA  Mother reports she is POA  No d/c planning needs evaluated presently   Will follow  Mother will transport home

## 2021-07-27 NOTE — ASSESSMENT & PLAN NOTE
· Suspect this is musculoskeletal chest pain because it is associated with coughing and sneezing and heavy breathing    · Tylenol for pain management, Voltaren  · D-dimer is normal

## 2021-07-27 NOTE — UTILIZATION REVIEW
Initial Clinical Review    Admission: Date/Time/Statement:   Admission Orders (From admission, onward)     Ordered        07/26/21 1612  Inpatient Admission  Once                   Orders Placed This Encounter   Procedures    Inpatient Admission     Standing Status:   Standing     Number of Occurrences:   1     Order Specific Question:   Level of Care     Answer:   Med Surg [16]     Order Specific Question:   Estimated length of stay     Answer:   More than 2 Midnights     Order Specific Question:   Certification     Answer:   I certify that inpatient services are medically necessary for this patient for a duration of greater than two midnights  See H&P and MD Progress Notes for additional information about the patient's course of treatment  ED Arrival Information     Expected Arrival Acuity    - 7/26/2021 13:59 Urgent         Means of arrival Escorted by Service Admission type    [facility] General Medicine Urgent         Arrival complaint    SOB (COVID+) PT IN [phone]        Chief Complaint   Patient presents with    Shortness of Breath     Pt reports being seen here yesterday for same chief complaint  States she was on the phone with ID who "didn't like the way I sounded over the phone so told me to come back here to be admitted "       Initial Presentation: 39year old female to the ED from home with complaints of shortness of breath, +COVID  Admitted to inpatient for COVID 19  Diagnosed with COVID on July 16, 2021 and started with fever, cough, shortness of breath, lightheadedness on 17th  Exertional pulse ox in 80s as per patient  Arrives with tachypnea, clear lungs  CRP elevated, D-dimer elevated  Increased work of breathing  CXR show: Right lower lobe opacity, indicative of pneumonia  Started on remdesivir, dexamethasone, self prone as able  Given Oxygen NC as needed  Date: 7/27   Day 2:  Continue with IV steroids  Self prone as tolerated  Give oxygen as needed   Bibasilar rales heard  Using accessory muscles during conversation  PT recommends home with home health  ED Triage Vitals   Temperature Pulse Respirations Blood Pressure SpO2   07/26/21 1432 07/26/21 1432 07/26/21 1432 07/26/21 1432 07/26/21 1432   98 2 °F (36 8 °C) 90 20 132/81 98 %      Temp Source Heart Rate Source Patient Position - Orthostatic VS BP Location FiO2 (%)   07/26/21 1432 07/26/21 1432 07/26/21 1432 07/26/21 1432 --   Tympanic Monitor Sitting Left arm       Pain Score       07/26/21 1911       2          Wt Readings from Last 1 Encounters:   07/26/21 82 8 kg (182 lb 8 7 oz)     Additional Vital Signs:   Time  Temp Pulse Resp  BP  MAP (mmHg)  SpO2  Calculated FIO2 (%) - Nasal Cannula  Nasal Cannula O2 Flow Rate (L/min)  O2 Device Patient Position - Orthostatic VS   07/27/21 09:23:36  98 3 °F (36 8 °C) 62 --  133/95  108  91 %  --  --  -- --   07/26/21 22:47:04  98 °F (36 7 °C) 64 --  135/95  108  92 %  --  --  -- --   07/26/21 19:56:27  98 3 °F (36 8 °C) 67 20  143/99  114  98 %  --  --  None (Room air) Lying   07/26/21 1928  -- 77 18  142/94  --  99 %  --  --  None (Room air) Lying   07/26/21 1911  -- 70 18  142/94  --  99 %  28  2 L/min  Nasal cannula Lying   07/26/21 1502  -- -- --  --  --  --  --  --  None (Room air) --   07/26/21 1432  98 2 °F (36 8 °C) 90 20  132/81  --  98 %  --  --  None (Room air) Sitting     Pertinent Labs/Diagnostic Test Results:   7/26 CXR: Right lower lobe opacity, indicative of pneumonia    In the setting of clinically suspected/proven COVID-19, this plain film appearance while nonspecific, can be seen in cases of viral pneumonia such as COVID-19    7/25 CXR: Mild bibasilar ground glass opacity due to Covid 19 pneumonia     7/26 EKG: Rate:     ECG rate assessment: normal     Rhythm:     Rhythm: sinus rhythm     Ectopy:     Ectopy: none     QRS:     QRS axis:  Normal   Conduction:     Conduction: normal     ST segments:     ST segments:  Normal   T waves:     T waves: normal        Results from last 7 days   Lab Units 07/27/21  0455 07/26/21  1517 07/25/21  0904   WBC Thousand/uL 2 68* 3 81* 4 31   HEMOGLOBIN g/dL 12 9 13 2 13 6   HEMATOCRIT % 39 1 41 0 41 7   PLATELETS Thousands/uL 227 223 185   NEUTROS ABS Thousands/µL  --   --  2 68   BANDS PCT %  --  12*  --          Results from last 7 days   Lab Units 07/27/21 0455 07/26/21 1517 07/25/21  0904   SODIUM mmol/L 138 139 136   POTASSIUM mmol/L 4 0 3 3* 3 5   CHLORIDE mmol/L 102 104 100   CO2 mmol/L 24 25 26   ANION GAP mmol/L 12 10 10   BUN mg/dL 12 9 8   CREATININE mg/dL 0 65 0 83 0 75   EGFR ml/min/1 73sq m 115 91 103   CALCIUM mg/dL 8 9 8 3 8 2*   MAGNESIUM mg/dL  --   --  1 6     Results from last 7 days   Lab Units 07/27/21 0455 07/26/21 1517 07/25/21  0904   AST U/L 42 59* 49*   ALT U/L 95* 100* 54   ALK PHOS U/L 69 63 67   TOTAL PROTEIN g/dL 7 5 6 9 7 4   ALBUMIN g/dL 3 1* 3 0* 3 1*   TOTAL BILIRUBIN mg/dL 0 22 0 21 0 20   BILIRUBIN DIRECT mg/dL  --   --  0 08         Results from last 7 days   Lab Units 07/27/21 0455 07/26/21 1517 07/25/21  0904   GLUCOSE RANDOM mg/dL 138 104 110         Results from last 7 days   Lab Units 07/26/21  1517 07/25/21  0904   TROPONIN I ng/mL <0 02 <0 02     Results from last 7 days   Lab Units 07/27/21 0455 07/26/21  1517   D-DIMER QUANTITATIVE ug/ml FEU 0 41 0 62*     Results from last 7 days   Lab Units 07/26/21 1517 07/25/21  0904   PROTIME seconds 12 3 12 5   INR  0 96 0 91   PTT seconds 28 28         Results from last 7 days   Lab Units 07/27/21 0455 07/26/21  1517   PROCALCITONIN ng/ml <0 05 <0 05     Results from last 7 days   Lab Units 07/26/21  1517 07/25/21  0904   LACTIC ACID mmol/L 1 2 1 2     Results from last 7 days   Lab Units 07/26/21  1517   NT-PRO BNP pg/mL 138*     Results from last 7 days   Lab Units 07/27/21  0455 07/26/21  1517   FERRITIN ng/mL 188 202     Results from last 7 days   Lab Units 07/26/21  1517   CRP mg/L 51 8*             Results from last 7 days   Lab Units 07/26/21  1517   TOTAL COUNTED  100     ED Treatment:   Medication Administration from 07/26/2021 1359 to 07/26/2021 1951       Date/Time Order Dose Route Action     07/26/2021 1519 dexamethasone (PF) (DECADRON) injection 10 mg 10 mg Intravenous Given     07/26/2021 1617 remdesivir (Veklury) 200 mg in sodium chloride 0 9 % 250 mL IVPB 200 mg Intravenous New Bag     07/26/2021 1756 famotidine (PEPCID) tablet 20 mg 20 mg Oral Given     07/26/2021 1756 doxycycline hyclate (VIBRAMYCIN) capsule 100 mg 100 mg Oral Given        Past Medical History:   Diagnosis Date    Acid reflux     GERD (gastroesophageal reflux disease)     Hyperlipidemia        Admitting Diagnosis: SOB (shortness of breath) [R06 02]  Hypoxia [R09 02]  Pneumonia due to COVID-19 virus [U07 1, J12 82]  Age/Sex: 39 y o  female  Admission Orders:  SCDS  UP and OOB    Scheduled Medications:  ascorbic acid, 1,000 mg, Oral, Q12H [address]  cefTRIAXone, 1,000 mg, Intravenous, Q24H  cholecalciferol, 2,000 Units, Oral, Daily  dexamethasone, 6 mg, Intravenous, Q24H  doxycycline hyclate, 100 mg, Oral, Q12H  enoxaparin, 40 mg, Subcutaneous, Daily  famotidine, 20 mg, Oral, BID  zinc sulfate, 220 mg, Oral, Daily   Followed by  Candelaria Carnes ON 8/3/2021] multivitamin-minerals, 1 tablet, Oral, Daily  nicotine, 1 patch, Transdermal, Daily  pantoprazole, 40 mg, Oral, Early Morning  remdesivir, 200 mg, Intravenous, Q24H   Followed by  remdesivir, 100 mg, Intravenous, Q24H      Continuous IV Infusions:     PRN Meds:  acetaminophen, 650 mg, Oral, Q6H PRN  ondansetron, 4 mg, Intravenous, Q6H PRN        Network Utilization Review Department  ATTENTION: Please call with any questions or concerns to [phone] and carefully listen to the prompts so that you are directed to the right person   All voicemails are confidential   Margarita Betts all requests for admission clinical reviews, approved or denied determinations and any other requests to dedicated fax number below belonging to the [facility] where the patient is receiving treatment   List of dedicated fax numbers for the Facilities:  [address] [place] [address] DENIALS (Administrative/Medical Necessity) [phone]   [address] (Maternity/NICU/Pediatrics) [phone] [address] Mohit Evans 9736 [address] [place] [address] [place] [address] [facility] [place] [address] [phone]

## 2021-07-27 NOTE — ASSESSMENT & PLAN NOTE
· In the past she was on hydroxyzine but currently not taking  · This patient was acutely short of breath but not desaturating, I think this patient is extremely anxious about her home situation as well as her progress in COVID treatment  · Xanax 0 25 q h s  P r n

## 2021-07-27 NOTE — ASSESSMENT & PLAN NOTE
Mild treatment plan  · Vitamin-C, D, zinc this is day 2 of 7  · Remdesivir this is day 2 of 5  · Dexamethasone IV  · Procalcitonin is normal in this patient is afebrile, white blood cells stable around 2 -3, today I discontinued the IV antibiotics  · Self prone as tolerated, have educated this patient on self proning because she was not aware of this technique    · Provide oxygen supplementation and wean as tolerated - saturating around 94% average on 2 L nasal cannula  · Monitor clinically, temperature curve - afebrile over the last 24 hours

## 2021-07-27 NOTE — PLAN OF CARE
Problem: PHYSICAL THERAPY ADULT  Goal: Performs mobility at highest level of function for planned discharge setting  See evaluation for individualized goals  Description: Treatment/Interventions: Functional transfer training, LE strengthening/ROM, Elevations, Therapeutic exercise, Endurance training, Patient/family training, Equipment eval/education, Bed mobility, Gait training, Spoke to nursing          See flowsheet documentation for full assessment, interventions and recommendations  Note: Prognosis: Good  Problem List: Decreased strength, Decreased endurance, Impaired balance, Decreased mobility, Pain  Assessment: Pt is 39 y o  female seen for PT evaluation s/p admit to [facility] on 7/26/2021 w/ COVID-19  PT consulted to assess pt's functional mobility and d/c needs  Order placed for PT eval and tx, w/ up and OOB as tolerated order  Performed at least 2 patient identifiers during session: Name and wristband  Comorbidities affecting pt's physical performance at time of assessment include: Acute respiratory failure with hypoxemia, Anxiety, Obesity, Gastroesophageal reflux disease without esophagitis  PTA, pt was independent w/ all functional mobility w/ no AD, ambulates unrestricted distances and all terrain, has 6 MARGUERITE, lives w/ family in bi-level house and works full time  Personal factors affecting pt at time of IE include: inaccessible home environment, stairs to enter home, inability to ambulate household distances, inability to navigate community distances, unable to perform dynamic tasks in community, inability to perform current job functions and inability to perform IADLs  Please find objective findings from PT assessment regarding body systems outlined above with impairments and limitations including weakness, impaired balance, decreased endurance, gait deviations, pain, decreased activity tolerance, decreased functional mobility tolerance and fall risk   The following objective measures performed on IE also reveal limitations: Barthel Index: 60/100 and Modified [place]: 3 (moderate disability)  Pt's clinical presentation is currently unstable/unpredictable seen in pt's presentation of ongoing medical management/monitoring, fatigue and persistent dizziness impacting overall mobility status and need for input for mobility technique/safety  Pt to benefit from continued PT tx to address deficits as defined above and maximize level of functional independent mobility and consistency  From PT/mobility standpoint, recommendation at time of d/c would be home with home health rehabilitation pending progress in order to facilitate return to PLOF  Barriers to Discharge: Inaccessible home environment        PT Discharge Recommendation: Home with home health rehabilitation     PT - OK to Discharge: No    See flowsheet documentation for full assessment

## 2021-07-27 NOTE — PLAN OF CARE
Problem: PAIN - ADULT  Goal: Verbalizes/displays adequate comfort level or baseline comfort level  Description: Interventions:  - Encourage patient to monitor pain and request assistance  - Assess pain using appropriate pain scale  - Administer analgesics based on type and severity of pain and evaluate response  - Implement non-pharmacological measures as appropriate and evaluate response  - Consider cultural and social influences on pain and pain management  - Notify physician/advanced practitioner if interventions unsuccessful or patient reports new pain  Outcome: Progressing     Problem: INFECTION - ADULT  Goal: Absence or prevention of progression during hospitalization  Description: INTERVENTIONS:  - Assess and monitor for signs and symptoms of infection  - Monitor lab/diagnostic results  - Monitor all insertion sites, i e  indwelling lines, tubes, and drains  - Monitor endotracheal if appropriate and nasal secretions for changes in amount and color  - [place] appropriate cooling/warming therapies per order  - Administer medications as ordered  - Instruct and encourage patient and family to use good hand hygiene technique  - Identify and instruct in appropriate isolation precautions for identified infection/condition  Outcome: Progressing  Goal: Absence of fever/infection during neutropenic period  Description: INTERVENTIONS:  - Monitor WBC    Outcome: Progressing     Problem: SAFETY ADULT  Goal: Patient will remain free of falls  Description: INTERVENTIONS:  - Educate patient/family on patient safety including physical limitations  - Instruct patient to call for assistance with activity   - Consult OT/PT to assist with strengthening/mobility   - Keep Call bell within reach  - Keep bed low and locked with side rails adjusted as appropriate  - Keep care items and personal belongings within reach  - Initiate and maintain comfort rounds  - Make Fall Risk Sign visible to staff  - Offer Toileting every Hours, in advance of need  - Initiate/Maintain alarm  - Obtain necessary fall risk management equipment:   - Apply yellow socks and bracelet for high fall risk patients  - Consider moving patient to room near nurses station  Outcome: Progressing  Goal: Maintain or return to baseline ADL function  Description: INTERVENTIONS:  -  Assess patient's ability to carry out ADLs; assess patient's baseline for ADL function and identify physical deficits which impact ability to perform ADLs (bathing, care of mouth/teeth, toileting, grooming, dressing, etc )  - Assess/evaluate cause of self-care deficits   - Assess range of motion  - Assess patient's mobility; develop plan if impaired  - Assess patient's need for assistive devices and provide as appropriate  - Encourage maximum independence but intervene and supervise when necessary  - Involve family in performance of ADLs  - Assess for home care needs following discharge   - Consider OT consult to assist with ADL evaluation and planning for discharge  - Provide patient education as appropriate  Outcome: Progressing  Goal: Maintains/Returns to pre admission functional level  Description: INTERVENTIONS:  - Perform BMAT or MOVE assessment daily    - Set and communicate daily mobility goal to care team and patient/family/caregiver  - Collaborate with rehabilitation services on mobility goals if consulted  - Perform Range of Motion times a day  - Reposition patient every  hours    - Dangle patient times a day  - Stand patient times a day  - Ambulate patient times a day  - Out of bed to chair times a day   - Out of bed for meals times a day  - Out of bed for toileting  - Record patient progress and toleration of activity level   Outcome: Progressing     Problem: DISCHARGE PLANNING  Goal: Discharge to home or other facility with appropriate resources  Description: INTERVENTIONS:  - Identify barriers to discharge w/patient and caregiver  - Arrange for needed discharge resources and transportation as appropriate  - Identify discharge learning needs (meds, wound care, etc )  - Arrange for interpretive services to assist at discharge as needed  - Refer to Case Management Department for coordinating discharge planning if the patient needs post-hospital services based on physician/advanced practitioner order or complex needs related to functional status, cognitive ability, or social support system  Outcome: Progressing     Problem: Knowledge Deficit  Goal: Patient/family/caregiver demonstrates understanding of disease process, treatment plan, medications, and discharge instructions  Description: Complete learning assessment and assess knowledge base    Interventions:  - Provide teaching at level of understanding  - Provide teaching via preferred learning methods  Outcome: Progressing     Problem: RESPIRATORY - ADULT  Goal: Achieves optimal ventilation and oxygenation  Description: INTERVENTIONS:  - Assess for changes in respiratory status  - Assess for changes in mentation and behavior  - Position to facilitate oxygenation and minimize respiratory effort  - Oxygen administered by appropriate delivery if ordered  - Initiate smoking cessation education as indicated  - Encourage broncho-pulmonary hygiene including cough, deep breathe, Incentive Spirometry  - Assess the need for suctioning and aspirate as needed  - Assess and instruct to report SOB or any respiratory difficulty  - Respiratory Therapy support as indicated  Outcome: Progressing

## 2021-07-27 NOTE — PHYSICAL THERAPY NOTE
Physical Therapy Evaluation     Patient's Name: Julio Cesar Ramos    Admitting Diagnosis  SOB (shortness of breath) [R06 02]  Hypoxia [R09 02]  Pneumonia due to COVID-19 virus [U07 1, J85 82]    Problem List  Patient Active Problem List   Diagnosis    Gastroesophageal reflux disease without esophagitis    Obesity    Stress response    Anxiety    Smoking    COVID-19    Acute respiratory failure with hypoxemia [facility])       Past Medical History  Past Medical History:   Diagnosis Date    Acid reflux     GERD (gastroesophageal reflux disease)     Hyperlipidemia        Past Surgical History  Past Surgical History:   Procedure Laterality Date     SECTION      DILATION AND CURETTAGE OF UTERUS      2x    TONSILECTOMY AND ADNOIDECTOMY      TONSILLECTOMY      TUBAL LIGATION            21 1416   PT Last Visit   PT Visit Date 21   Note Type   Note type Evaluation   Pain Assessment   Pain Assessment Tool 0-10   Pain Score 4   Pain Location/Orientation Location: Chest   Pain Onset/Description Onset: Ongoing   Hospital Pain Intervention(s) Ambulation/increased activity   Multiple Pain Sites No   Home Living   Type of Home House  (bi-level house)   Home Layout Multi-level;Bed/bath upstairs  (6 MARGUERITE, 10+10 interior stairs )   Bathroom Shower/Tub Tub/shower unit   Bathroom Toilet Standard   Bathroom Equipment Other (Comment)  (none per pt)   [facility] Other (Comment)  (none per pt)   Prior Function   Level of [place] Independent with ADLs and functional mobility   Lives With Family; Spouse   Receives Help From Family   ADL Assistance Independent   IADLs Independent   Falls in the last 6 months 0   Vocational Full time employment  ()   Comments pt drives   Restrictions/Precautions   [place] Bearing Precautions Per Order No   Braces or Orthoses Other (Comment)  (none per pt)   Other Precautions Contact/isolation; Airborne/isolation;O2;Multiple lines; Fall Risk;Pain  (2L O2 NC; COVID-19 Confirmed)   General   Family/Caregiver Present No   Cognition   Overall Cognitive Status WFL   Arousal/Participation Cooperative   Orientation Level Oriented X4   Memory Within functional limits   Following Commands Follows all commands and directions without difficulty   Comments pt agreeable to PT eval   RUE Assessment   RUE Assessment WFL  (based on functional assessment)   LUE Assessment   LUE Assessment WFL  (based on functional assessment)   RLE Assessment   RLE Assessment WFL  (grossly assessed with functional mobility: at least 4/5)   LLE Assessment   LLE Assessment WFL  (grossly assessed with functional mobility: at least 4/5)   Coordination   Movements are Fluid and Coordinated 1   Sensation WFL   Light Touch   RLE Light Touch Grossly intact   LLE Light Touch Grossly intact   Bed Mobility   Supine to Sit 5  Supervision   Additional items Assist x 1; Increased time required;Verbal cues   Sit to Supine 5  Supervision   Additional items Assist x 1; Increased time required;Verbal cues   Transfers   Sit to Stand 5  Supervision   Additional items Assist x 1; Increased time required;Verbal cues   Stand to Sit 5  Supervision   Additional items Assist x 1; Increased time required;Verbal cues   Ambulation/Elevation   Gait pattern Decreased foot clearance; Excessively slow; Step to;Short stride   Gait Assistance 5  Supervision   Additional items Assist x 1;Verbal cues   Assistive Device None   Distance 3' lateral steps at bedside; limited by persistent dizziness and SOB with minimal exertion   Stair Management Assistance Not tested   Balance   Static Sitting Fair +   Dynamic Sitting Fair +   Static Standing Fair   Dynamic Standing Fair -   Ambulatory Fair -   Endurance Deficit   Endurance Deficit Yes   Activity Tolerance   Activity Tolerance Patient limited by fatigue;Treatment limited secondary to medical complications (Comment)  (persistent dizziness and SOB with minimal exertion)   Nurse Made Aware RICHELLE Bhatti confirmed pt appropriate for therapy; made aware of session outcomes   Assessment   Prognosis Good   Problem List Decreased strength;Decreased endurance; Impaired balance;Decreased mobility;Pain   Assessment Pt is 39 y o  female seen for PT evaluation s/p admit to [facility] on 7/26/2021 w/ COVID-19  PT consulted to assess pt's functional mobility and d/c needs  Order placed for PT eval and tx, w/ up and OOB as tolerated order  Performed at least 2 patient identifiers during session: Name and wristband  Comorbidities affecting pt's physical performance at time of assessment include: Acute respiratory failure with hypoxemia, Anxiety, Obesity, Gastroesophageal reflux disease without esophagitis  PTA, pt was independent w/ all functional mobility w/ no AD, ambulates unrestricted distances and all terrain, has 6 MARGUERITE, lives w/ family in bi-level house and works full time  Personal factors affecting pt at time of IE include: inaccessible home environment, stairs to enter home, inability to ambulate household distances, inability to navigate community distances, unable to perform dynamic tasks in community, inability to perform current job functions and inability to perform IADLs  Please find objective findings from PT assessment regarding body systems outlined above with impairments and limitations including weakness, impaired balance, decreased endurance, gait deviations, pain, decreased activity tolerance, decreased functional mobility tolerance and fall risk  The following objective measures performed on IE also reveal limitations: Barthel Index: 60/100 and Modified [place]: 3 (moderate disability)  Pt's clinical presentation is currently unstable/unpredictable seen in pt's presentation of ongoing medical management/monitoring, fatigue and persistent dizziness impacting overall mobility status and need for input for mobility technique/safety   Pt to benefit from continued PT tx to address deficits as defined above and maximize level of functional independent mobility and consistency  From PT/mobility standpoint, recommendation at time of d/c would be home with home health rehabilitation pending progress in order to facilitate return to PLOF  Barriers to Discharge Inaccessible home environment   Goals   Patient Goals to return home   STG Expiration Date 08/06/21   Short Term Goal #1 In 7-10 days: Increase bilateral LE strength 1/2 grade to facilitate independent mobility, Perform all bed mobility tasks modified independent to decrease caregiver burden, Perform all transfers independently to improve independence, Ambulate > 150 ft  without AD independently w/o LOB and w/ normalized gait pattern 100% of the time, Navigate 10 stairs modified independent with unilateral handrail to facilitate return to previous living environment, Increase all balance 1 grade to decrease risk for falls and Complete exercise program independently   PT Treatment Day 0   Plan   Treatment/Interventions Functional transfer training;LE strengthening/ROM; Elevations; Therapeutic exercise; Endurance training;Patient/family training;Equipment eval/education; Bed mobility;Gait training;Spoke to nursing   PT Frequency Other (Comment)  (3-5x/wk)   Recommendation   PT Discharge Recommendation Home with home health rehabilitation   PT - OK to Discharge No   AM-PAC Basic Mobility Inpatient   Turning in Bed Without Bedrails 3   Lying on Back to Sitting on Edge of Flat Bed 3   Moving Bed to Chair 3   Standing Up From Chair 3   Walk in Room 3   Climb 3-5 Stairs 3   Basic Mobility Inpatient Raw Score 18   Basic Mobility Standardized Score 41 05   Modified [place] Scale   Modified [place] Scale 3   Barthel Index   Feeding 10   Bathing 0   Grooming Score 5   Dressing Score 10   Bladder Score 10   Bowels Score 10   Toilet Use Score 5   Transfers (Bed/Chair) Score 10   Mobility (Level Surface) Score 0   Stairs Score 0   Barthel Index Score 60         Amarjit Diaz, PT, DPT

## 2021-07-27 NOTE — PROGRESS NOTES
[facility]  Progress Note - Mike Morganyeimy 1985, 39 y o  female MRN: [id number]  Unit/Bed#: -[facility] Encounter: [id number]  Primary Care Provider: PILAR Aabd   Date and time admitted to hospital: 7/26/2021  2:37 PM    * COVID-19  Assessment & Plan  Mild treatment plan  · Vitamin-C, D, zinc this is day 2 of 7  · Remdesivir this is day 2 of 5  · Dexamethasone IV  · Procalcitonin is normal in this patient is afebrile, white blood cells stable around 2 -3, today I discontinued the IV antibiotics  · Self prone as tolerated, have educated this patient on self proning because she was not aware of this technique  · Provide oxygen supplementation and wean as tolerated - saturating around 94% average on 2 L nasal cannula  · Monitor clinically, temperature curve - afebrile over the last 24 hours    Acute respiratory failure with hypoxemia (HCC)  Assessment & Plan  · Secondary to COVID-19 pneumonia  · Currently saturating 94% average with 2 L nasal cannula oxygen    Chest pain  Assessment & Plan  · Suspect this is musculoskeletal chest pain because it is associated with coughing and sneezing and heavy breathing  · Tylenol for pain management, Voltaren  · D-dimer is normal    Anxiety  Assessment & Plan  · In the past she was on hydroxyzine but currently not taking  · This patient was acutely short of breath but not desaturating, I think this patient is extremely anxious about her home situation as well as her progress in COVID treatment  · Xanax 0 25 q h s  P r n  Obesity  Assessment & Plan  She would benefit from weight loss after resolution of acute illness    Gastroesophageal reflux disease without esophagitis  Assessment & Plan  As an outpatient on Nexium  Continue famotidine while here  VTE Pharmacologic Prophylaxis: VTE Score: 7 High Risk (Score >/= 5) - Pharmacological DVT Prophylaxis Ordered: enoxaparin (Lovenox)  Sequential Compression Devices Ordered      Patient Centered Rounds: I performed bedside rounds with nursing staff today  Discussions with Specialists or Other Care Team Provider: none    Education and Discussions with Family / Patient: Patient declined call to   Time Spent for Care: 30 minutes  More than 50% of total time spent on counseling and coordination of care as described above  Current Length of Stay: 1 day(s)  Current Patient Status: Inpatient   Certification Statement: The patient will continue to require additional inpatient hospital stay due to iv remdesivir, iv steroid, weaning o2  Discharge Plan: Anticipate discharge in >72 hrs to home  Code Status: Level 1 - Full Code    Subjective:   Patient complaining of shortness of breath with any movement, it takes her a long time to recover from coughing fits  Patient have continue coughing, this is improved since admission however  Patient did not know about self proning, thus I have educated her   Patient has minimal p o  Intake because of malaise, coughing and shortness of breath  Patient admits she did not sleep much last night because was hard to find a comfortable position  Patient admits to significant anxiety about children staying at home with her grandparents, both she and her  are in the hospital, and her progress with COVID treatment  Patient admits to some chest burning located in the midsternal area as well as musculoskeletal type pain associated with coughing, deep breathing and sneezing  I advised the patient to ask for pain medications from her nurse when she has this chest pain  Objective:     Vitals:   Temp (24hrs), Av 2 °F (36 8 °C), Min:98 °F (36 7 °C), Max:98 3 °F (36 8 °C)    Temp:  [98 °F (36 7 °C)-98 3 °F (36 8 °C)] 98 °F (36 7 °C)  HR:  [62-77] 70  Resp:  [18-20] 20  BP: (130-143)/(88-99) 130/88  SpO2:  [91 %-99 %] 96 %  Body mass index is 32 34 kg/m²  Input and Output Summary (last 24 hours):      Intake/Output Summary (Last 24 hours) at 7/27/2021 1715  Last data filed at 7/26/2021 1756  Gross per 24 hour   Intake 250 ml   Output --   Net 250 ml       Physical Exam:   Physical Exam  Vitals and nursing note reviewed  Constitutional:       General: She is in acute distress  Appearance: She is well-developed  She is ill-appearing  She is not diaphoretic  Comments: Patient is acutely distressed, very anxious, heavy breathing   HENT:      Head: Normocephalic and atraumatic  Nose: Nose normal       Mouth/Throat:      Mouth: Mucous membranes are dry  Eyes:      Conjunctiva/sclera: Conjunctivae normal    Cardiovascular:      Rate and Rhythm: Normal rate and regular rhythm  Heart sounds: No murmur heard  Pulmonary:      Effort: Pulmonary effort is normal  No respiratory distress  Breath sounds: Rales present  Comments: Bilateral rales in all lung fields  Patient is short of breath and using accessory respiratory muscles during conversation, as well as with movement during my physical exam  Abdominal:      General: There is no distension  Palpations: Abdomen is soft  Tenderness: There is no abdominal tenderness  Musculoskeletal:         General: No swelling or tenderness  Normal range of motion  Cervical back: Neck supple  Skin:     General: Skin is warm and dry  Capillary Refill: Capillary refill takes less than 2 seconds  Neurological:      General: No focal deficit present  Mental Status: She is alert  Psychiatric:      Comments: Visibly anxious, tearful            Additional Data:     Labs:  Results from last 7 days   Lab Units 07/27/21  0455 07/26/21  1517 07/25/21  0904   WBC Thousand/uL 2 68* 3 81* 4 31   HEMOGLOBIN g/dL 12 9 13 2 13 6   HEMATOCRIT % 39 1 41 0 41 7   PLATELETS Thousands/uL 227 223 185   BANDS PCT %  --  12*  --    NEUTROS PCT %  --   --  63   LYMPHS PCT %  --   --  30   LYMPHO PCT %  --  39  --    MONOS PCT %  --   --  7   MONO PCT %  --  9  --    EOS PCT %  --  0 0     Results from last 7 days   Lab Units 07/27/21  0455   SODIUM mmol/L 138   POTASSIUM mmol/L 4 0   CHLORIDE mmol/L 102   CO2 mmol/L 24   BUN mg/dL 12   CREATININE mg/dL 0 65   ANION GAP mmol/L 12   CALCIUM mg/dL 8 9   ALBUMIN g/dL 3 1*   TOTAL BILIRUBIN mg/dL 0 22   ALK PHOS U/L 69   ALT U/L 95*   AST U/L 42   GLUCOSE RANDOM mg/dL 138     Results from last 7 days   Lab Units 07/26/21  1517   INR  0 96             Results from last 7 days   Lab Units 07/27/21  0455 07/26/21  1517 07/25/21  0904   LACTIC ACID mmol/L  --  1 2 1 2   PROCALCITONIN ng/ml <0 05 <0 05  --        Lines/Drains:  Invasive Devices     Peripheral Intravenous Line            Peripheral IV 07/26/21 Right Antecubital 1 day                      Imaging: Reviewed radiology reports from this admission including: chest xray    Recent Cultures (last 7 days): none        Last 24 Hours Medication List:   Current Facility-Administered Medications   Medication Dose Route Frequency Provider Last Rate    acetaminophen  975 mg Oral Q6H PRN Maribel Berry PA-C      ALPRAZolam  0 25 mg Oral HS PRN Maribel Berry PA-C      ascorbic acid  1,000 mg Oral Q12H [address]      cholecalciferol  2,000 Units Oral Daily Susy Bryant MD      dexamethasone  6 mg Intravenous Q24H Susy Bryant MD      Diclofenac Sodium  2 g Topical 4x Daily PRN Maribel Berry PA-C      enoxaparin  40 mg Subcutaneous Daily Susy Bryant MD      famotidine  20 mg Oral BID Susy Bryant MD      zinc sulfate  220 mg Oral Daily Susy Bryant MD      Followed by   [place] ON 8/3/2021] multivitamin-minerals  1 tablet Oral Daily Susy Bryant MD      nicotine  1 patch Transdermal Daily Susy Bryant MD      ondansetron  4 mg Intravenous Q6H PRN Susy Bryant MD      pantoprazole  40 mg Oral Early Morning [place], [place]      remdesivir  100 mg Intravenous Q24H Susy Bryant MD      sodium chloride  75 mL/hr Intravenous Continuous Maribel Berry PA-C 75 mL/hr (07/27/21 1233)    sodium chloride  3 mL Nebulization Once Dougie Clifford PA-C          Today, Patient Was Seen By: Dougie Clifford PA-C    **Please Note: This note may have been constructed using a voice recognition system  **

## 2021-07-28 LAB
ANION GAP SERPL CALCULATED.3IONS-SCNC: 11 MMOL/L (ref 4–13)
BUN SERPL-MCNC: 17 MG/DL (ref 5–25)
CALCIUM SERPL-MCNC: 9 MG/DL (ref 8.3–10.1)
CHLORIDE SERPL-SCNC: 105 MMOL/L (ref 100–108)
CO2 SERPL-SCNC: 24 MMOL/L (ref 21–32)
CREAT SERPL-MCNC: 0.7 MG/DL (ref 0.6–1.3)
ERYTHROCYTE [DISTWIDTH] IN BLOOD BY AUTOMATED COUNT: 13.1 % (ref 11.6–15.1)
GFR SERPL CREATININE-BSD FRML MDRD: 112 ML/MIN/1.73SQ M
GLUCOSE SERPL-MCNC: 138 MG/DL (ref 65–140)
HCT VFR BLD AUTO: 43.4 % (ref 34.8–46.1)
HGB BLD-MCNC: 14.2 G/DL (ref 11.5–15.4)
IL6 SERPL-MCNC: 4.3 PG/ML (ref 0–13)
MCH RBC QN AUTO: 27.9 PG (ref 26.8–34.3)
MCHC RBC AUTO-ENTMCNC: 32.7 G/DL (ref 31.4–37.4)
MCV RBC AUTO: 85 FL (ref 82–98)
PLATELET # BLD AUTO: 319 THOUSANDS/UL (ref 149–390)
PMV BLD AUTO: 9.4 FL (ref 8.9–12.7)
POTASSIUM SERPL-SCNC: 4.2 MMOL/L (ref 3.5–5.3)
RBC # BLD AUTO: 5.09 MILLION/UL (ref 3.81–5.12)
SODIUM SERPL-SCNC: 140 MMOL/L (ref 136–145)
WBC # BLD AUTO: 5.97 THOUSAND/UL (ref 4.31–10.16)

## 2021-07-28 PROCEDURE — 85027 COMPLETE CBC AUTOMATED: CPT | Performed by: PHYSICIAN ASSISTANT

## 2021-07-28 PROCEDURE — 99232 SBSQ HOSP IP/OBS MODERATE 35: CPT | Performed by: NURSE PRACTITIONER

## 2021-07-28 PROCEDURE — 80048 BASIC METABOLIC PNL TOTAL CA: CPT | Performed by: PHYSICIAN ASSISTANT

## 2021-07-28 RX ADMIN — PANTOPRAZOLE SODIUM 40 MG: 40 TABLET, DELAYED RELEASE ORAL at 07:14

## 2021-07-28 RX ADMIN — FAMOTIDINE 20 MG: 20 TABLET ORAL at 17:22

## 2021-07-28 RX ADMIN — ZOLPIDEM TARTRATE 5 MG: 5 TABLET, COATED ORAL at 21:25

## 2021-07-28 RX ADMIN — ZINC SULFATE 220 MG (50 MG) CAPSULE 220 MG: CAPSULE at 09:20

## 2021-07-28 RX ADMIN — OXYCODONE HYDROCHLORIDE AND ACETAMINOPHEN 1000 MG: 500 TABLET ORAL at 21:24

## 2021-07-28 RX ADMIN — FAMOTIDINE 20 MG: 20 TABLET ORAL at 09:20

## 2021-07-28 RX ADMIN — Medication 2000 UNITS: at 09:20

## 2021-07-28 RX ADMIN — OXYCODONE HYDROCHLORIDE AND ACETAMINOPHEN 1000 MG: 500 TABLET ORAL at 09:20

## 2021-07-28 RX ADMIN — BENZONATATE 100 MG: 100 CAPSULE ORAL at 21:25

## 2021-07-28 RX ADMIN — ENOXAPARIN SODIUM 40 MG: 40 INJECTION SUBCUTANEOUS at 09:22

## 2021-07-28 RX ADMIN — REMDESIVIR 100 MG: 100 INJECTION, POWDER, LYOPHILIZED, FOR SOLUTION INTRAVENOUS at 18:08

## 2021-07-28 RX ADMIN — DEXAMETHASONE SODIUM PHOSPHATE 6 MG: 4 INJECTION INTRA-ARTICULAR; INTRALESIONAL; INTRAMUSCULAR; INTRAVENOUS; SOFT TISSUE at 17:22

## 2021-07-28 NOTE — ASSESSMENT & PLAN NOTE
· In the past she was on hydroxyzine but currently not taking  · This patient was acutely short of breath but not desaturating, I think this patient is extremely anxious about her home situation as well as her progress in [facility] treatment  · Consider starting patient on daily medication for anxiety due to her own health issues as well as stress at home

## 2021-07-28 NOTE — ASSESSMENT & PLAN NOTE
· Suspect this is musculoskeletal chest pain as evidenced by worse with coughing, sneezing and heavy breathing    · Tylenol for pain management, Voltaren  · D-dimer is normal

## 2021-07-28 NOTE — UTILIZATION REVIEW
Inpatient Admission Authorization Request   NOTIFICATION OF INPATIENT ADMISSION/INPATIENT AUTHORIZATION REQUEST   SERVICING FACILITY:   [address] [place] [address]  Tax ID: [id number]  NPI: [id number]  Place of Service: Inpatient [facility]  [address]  Place of Service Code: 24     ATTENDING PROVIDER:  Attending Name and NPI#: David Nix Md [[id number]]  Address: [address] [place] [address]  Phone: [phone]     UTILIZATION REVIEW CONTACT:  Verenice Boone, Utilization   Network Utilization Review Department  Phone: [phone]  Fax [phone]  Email: [email or web address]     PHYSICIAN ADVISORY SERVICES:  FOR [facility] REVIEW - MEDICAL NECESSITY DENIAL  Phone: [phone]  Fax: [phone]  Email: [email or web address]     TYPE OF REQUEST:  Inpatient Status     ADMISSION INFORMATION:  ADMISSION DATE/TIME: 7/26/21  4:12 PM  PATIENT DIAGNOSIS CODE/DESCRIPTION:  SOB (shortness of breath) [R06 02]  Hypoxia [R09 02]  Pneumonia due to COVID-19 virus [U07 1, J12 82]  DISCHARGE DATE/TIME: No discharge date for patient encounter  DISCHARGE DISPOSITION (IF DISCHARGED): Home/Self Care     IMPORTANT INFORMATION:  Please contact the Verenice Boone directly with any questions or concerns regarding this request  Department voicemails are confidential     Send requests for admission clinical reviews, concurrent reviews, approvals, and administrative denials due to lack of clinical to fax [phone]

## 2021-07-28 NOTE — PROGRESS NOTES
[address] [facility] [place]     Progress Note - Hannah Mathews 1985, 39 y o  female MRN: [id number]  Unit/Bed#: -01 Encounter: [id number]  Primary Care Provider: Denzil Spurling, CRNP   Date and time admitted to hospital: 7/26/2021  2:37 PM    * COVID-19  Assessment & Plan  · Patient presented to the ED with complaints of worsening fatigue, cough, shortness of breath  · Patient positive for covid-19  · Chest x-ray (7/26/21): Right lower lobe opacity, indicative of pneumonia  In the setting of clinically suspected/proven COVID-19, this plain film appearance while nonspecific, can be seen in cases of viral pneumonia such as COVID-19  · Initial inflammatory markers noted  · D-Dimer 0 62, CRP 51 8, Ferritin 202  · Initial cardiac markers noted  · Trop <0 02,   · Mild treatment plan  · Vitamin-C, D, zinc this is day 3 of 7  · Remdesivir this is day 3 of 5  · Dexamethasone IV - Day 3/10  · Of note, D-Dimer as of 7/27 was 0 41, Ferritin 188  · Procalcitonin x 2 negative; patient is afebrile, patient with leukopenia however, remaining stable  Antibiotics discontinued as of 7/27  Observe off  · Self prone as tolerated  · Provide oxygen supplementation and wean as tolerated - saturating around 94% average on 2 L nasal cannula  · Monitor clinically, temperature curve - afebrile over the last 24 hours    Chest pain  Assessment & Plan  · Suspect this is musculoskeletal chest pain as evidenced by worse with coughing, sneezing and heavy breathing  · Tylenol for pain management, Voltaren  · D-dimer is normal    Acute respiratory failure with hypoxemia (HCC)  Assessment & Plan  · Secondary to COVID-19 pneumonia  · Currently saturating 94% average with 2 L nasal cannula oxygen    Anxiety  Assessment & Plan  · In the past she was on hydroxyzine but currently not taking     · This patient was acutely short of breath but not desaturating, I think this patient is extremely anxious about her home situation as well as her progress in [facility] treatment  · Consider starting patient on daily medication for anxiety due to her own health issues as well as stress at home  Obesity  Assessment & Plan  · As evidenced by BMI of 32 34  · Counseled on lifestyle changes  Gastroesophageal reflux disease without esophagitis  Assessment & Plan  · As an outpatient on Nexium  Continue famotidine while here  VTE Pharmacologic Prophylaxis: VTE Score: 7 High Risk (Score >/= 5) - Pharmacological DVT Prophylaxis Ordered: enoxaparin (Lovenox)  Sequential Compression Devices Ordered  Patient Centered Rounds: I performed bedside rounds with nursing staff today  Discussions with Specialists or Other Care Team Provider: Case Management    Education and Discussions with Family / Patient: Patient declined call to   Time Spent for Care: 20 minutes  More than 50% of total time spent on counseling and coordination of care as described above  Current Length of Stay: 2 day(s)  Current Patient Status: Inpatient   Certification Statement: The patient will continue to require additional inpatient hospital stay due to ongoing treatment in setting of covid-19 infection, still on supplemental oxygen, on day 3/5 Remdesivir  Discharge Plan: Anticipate discharge in 48 hrs to home  Code Status: Level 1 - Full Code    Subjective:   CC: "I still feel really tired "    Patient resting in bed at this time  Denies any complaints of chest pain, or worsening shortness of breath fever, chills overnight however just feels weak and tired overall  Reports that she is anxious about getting home as her  is being released from the hospital today to go home she worries about him being at home alone due to his chronic medical conditions      Objective:     Vitals:   Temp (24hrs), Av 1 °F (36 7 °C), Min:98 °F (36 7 °C), Max:98 1 °F (36 7 °C)    Temp:  [98 °F (36 7 °C)-98 1 °F (36 7 °C)] 98 1 °F (36 7 °C)  HR:  [42-70] 42  Resp: [18-20] 18  BP: (130-134)/(88-95) 134/95  SpO2:  [96 %-97 %] 97 %  Body mass index is 32 34 kg/m²  Input and Output Summary (last 24 hours): Intake/Output Summary (Last 24 hours) at 7/28/2021 1304  Last data filed at 7/27/2021 1900  Gross per 24 hour   Intake 480 ml   Output --   Net 480 ml       Physical Exam:   Physical Exam  Vitals and nursing note reviewed  Constitutional:       General: She is not in acute distress  Appearance: She is obese  She is ill-appearing  Cardiovascular:      Rate and Rhythm: Normal rate  Pulses: Normal pulses  Heart sounds: Normal heart sounds  Pulmonary:      Effort: Pulmonary effort is normal  No tachypnea or bradypnea  Breath sounds: Examination of the right-lower field reveals rales  Rales present  Abdominal:      General: Bowel sounds are normal       Palpations: Abdomen is soft  Musculoskeletal:         General: Normal range of motion  Skin:     General: Skin is warm and dry  Capillary Refill: Capillary refill takes less than 2 seconds  Neurological:      Mental Status: She is alert and oriented to person, place, and time     Psychiatric:         Mood and Affect: Mood normal           Additional Data:     Labs:  Results from last 7 days   Lab Units 07/28/21  0724 07/26/21  1517 07/25/21  0904   WBC Thousand/uL 5 97 3 81* 4 31   HEMOGLOBIN g/dL 14 2 13 2 13 6   HEMATOCRIT % 43 4 41 0 41 7   PLATELETS Thousands/uL 319 223 185   BANDS PCT %  --  12*  --    NEUTROS PCT %  --   --  63   LYMPHS PCT %  --   --  30   LYMPHO PCT %  --  39  --    MONOS PCT %  --   --  7   MONO PCT %  --  9  --    EOS PCT %  --  0 0     Results from last 7 days   Lab Units 07/28/21  0724 07/27/21  0455   SODIUM mmol/L 140 138   POTASSIUM mmol/L 4 2 4 0   CHLORIDE mmol/L 105 102   CO2 mmol/L 24 24   BUN mg/dL 17 12   CREATININE mg/dL 0 70 0 65   ANION GAP mmol/L 11 12   CALCIUM mg/dL 9 0 8 9   ALBUMIN g/dL  --  3 1*   TOTAL BILIRUBIN mg/dL  --  0 22   ALK PHOS U/L --  69   ALT U/L  --  95*   AST U/L  --  42   GLUCOSE RANDOM mg/dL 138 138     Results from last 7 days   Lab Units 07/26/21  1517   INR  0 96             Results from last 7 days   Lab Units 07/27/21  0455 07/26/21  1517 07/25/21  0904   LACTIC ACID mmol/L  --  1 2 1 2   PROCALCITONIN ng/ml <0 05 <0 05  --        Lines/Drains:  Invasive Devices     Peripheral Intravenous Line            Peripheral IV 07/26/21 Right Antecubital 1 day                      Imaging: Reviewed radiology reports from this admission including: chest xray    Recent Cultures (last 7 days):         Last 24 Hours Medication List:   Current Facility-Administered Medications   Medication Dose Route Frequency Provider Last Rate    acetaminophen  975 mg Oral Q6H PRN Maribel Berry PA-C      ascorbic acid  1,000 mg Oral Q12H 5001 YARA Tanner MD      benzonatate  100 mg Oral TID PRN Vidal Potter PA-C      cholecalciferol  2,000 Units Oral Daily Otilia Philippe MD      dexamethasone  6 mg Intravenous Q24H Otilia Philippe MD      Diclofenac Sodium  2 g Topical 4x Daily PRN Maribel Berry PA-C      enoxaparin  40 mg Subcutaneous Daily Otilia Philippe MD      famotidine  20 mg Oral BID Otilia Philippe MD      zinc sulfate  220 mg Oral Daily Otilia Philippe MD      Followed by   Laureen Holley ON 8/3/2021] multivitamin-minerals  1 tablet Oral Daily Otilia Philippe MD      nicotine  1 patch Transdermal Daily Otilia Philippe MD      ondansetron  4 mg Intravenous Q6H PRN Otilia Philippe MD      pantoprazole  40 mg Oral Early Morning Vidal Potter, [place]      remdesivir  100 mg Intravenous Q24H Otilia Philippe  mg (07/27/21 7436)    sodium chloride  3 mL Nebulization Once Maribel Berry PA-C      zolpidem  5 mg Oral HS PRN Vidal Potter PA-C          Today, Patient Was Seen By: PILAR Underwood    **Please Note: This note may have been constructed using a voice recognition system  **

## 2021-07-29 LAB
ALBUMIN SERPL BCP-MCNC: 2.9 G/DL (ref 3.5–5)
ALP SERPL-CCNC: 55 U/L (ref 46–116)
ALT SERPL W P-5'-P-CCNC: 117 U/L (ref 12–78)
ANION GAP SERPL CALCULATED.3IONS-SCNC: 9 MMOL/L (ref 4–13)
AST SERPL W P-5'-P-CCNC: 34 U/L (ref 5–45)
BILIRUB SERPL-MCNC: 0.17 MG/DL (ref 0.2–1)
BUN SERPL-MCNC: 14 MG/DL (ref 5–25)
CALCIUM ALBUM COR SERPL-MCNC: 9.1 MG/DL (ref 8.3–10.1)
CALCIUM SERPL-MCNC: 8.2 MG/DL (ref 8.3–10.1)
CHLORIDE SERPL-SCNC: 106 MMOL/L (ref 100–108)
CO2 SERPL-SCNC: 26 MMOL/L (ref 21–32)
CREAT SERPL-MCNC: 0.72 MG/DL (ref 0.6–1.3)
ERYTHROCYTE [DISTWIDTH] IN BLOOD BY AUTOMATED COUNT: 13.1 % (ref 11.6–15.1)
GFR SERPL CREATININE-BSD FRML MDRD: 108 ML/MIN/1.73SQ M
GLUCOSE SERPL-MCNC: 137 MG/DL (ref 65–140)
HCT VFR BLD AUTO: 38.8 % (ref 34.8–46.1)
HGB BLD-MCNC: 12.7 G/DL (ref 11.5–15.4)
MCH RBC QN AUTO: 27.7 PG (ref 26.8–34.3)
MCHC RBC AUTO-ENTMCNC: 32.7 G/DL (ref 31.4–37.4)
MCV RBC AUTO: 85 FL (ref 82–98)
PLATELET # BLD AUTO: 326 THOUSANDS/UL (ref 149–390)
PMV BLD AUTO: 9.2 FL (ref 8.9–12.7)
POTASSIUM SERPL-SCNC: 4.3 MMOL/L (ref 3.5–5.3)
PROT SERPL-MCNC: 6.8 G/DL (ref 6.4–8.2)
RBC # BLD AUTO: 4.58 MILLION/UL (ref 3.81–5.12)
SODIUM SERPL-SCNC: 141 MMOL/L (ref 136–145)
WBC # BLD AUTO: 8.31 THOUSAND/UL (ref 4.31–10.16)

## 2021-07-29 PROCEDURE — 80053 COMPREHEN METABOLIC PANEL: CPT | Performed by: NURSE PRACTITIONER

## 2021-07-29 PROCEDURE — 99232 SBSQ HOSP IP/OBS MODERATE 35: CPT | Performed by: PHYSICIAN ASSISTANT

## 2021-07-29 PROCEDURE — 85027 COMPLETE CBC AUTOMATED: CPT | Performed by: NURSE PRACTITIONER

## 2021-07-29 RX ADMIN — BENZONATATE 100 MG: 100 CAPSULE ORAL at 21:56

## 2021-07-29 RX ADMIN — ENOXAPARIN SODIUM 40 MG: 40 INJECTION SUBCUTANEOUS at 09:34

## 2021-07-29 RX ADMIN — OXYCODONE HYDROCHLORIDE AND ACETAMINOPHEN 1000 MG: 500 TABLET ORAL at 09:32

## 2021-07-29 RX ADMIN — FAMOTIDINE 20 MG: 20 TABLET ORAL at 09:33

## 2021-07-29 RX ADMIN — ZINC SULFATE 220 MG (50 MG) CAPSULE 220 MG: CAPSULE at 09:33

## 2021-07-29 RX ADMIN — OXYCODONE HYDROCHLORIDE AND ACETAMINOPHEN 1000 MG: 500 TABLET ORAL at 21:56

## 2021-07-29 RX ADMIN — FAMOTIDINE 20 MG: 20 TABLET ORAL at 17:51

## 2021-07-29 RX ADMIN — ZOLPIDEM TARTRATE 5 MG: 5 TABLET, COATED ORAL at 21:56

## 2021-07-29 RX ADMIN — REMDESIVIR 100 MG: 100 INJECTION, POWDER, LYOPHILIZED, FOR SOLUTION INTRAVENOUS at 17:53

## 2021-07-29 RX ADMIN — DEXAMETHASONE SODIUM PHOSPHATE 6 MG: 4 INJECTION INTRA-ARTICULAR; INTRALESIONAL; INTRAMUSCULAR; INTRAVENOUS; SOFT TISSUE at 17:48

## 2021-07-29 RX ADMIN — Medication 2000 UNITS: at 09:33

## 2021-07-29 RX ADMIN — PANTOPRAZOLE SODIUM 40 MG: 40 TABLET, DELAYED RELEASE ORAL at 06:08

## 2021-07-29 NOTE — ASSESSMENT & PLAN NOTE
· Patient presented to the ED with complaints of worsening fatigue, cough, shortness of breath  · Patient positive for covid-19  · Chest x-ray (7/26/21): Right lower lobe opacity, indicative of pneumonia  In the setting of clinically suspected/proven COVID-19, this plain film appearance while nonspecific, can be seen in cases of viral pneumonia such as COVID-19  · Initial inflammatory markers noted  · D-Dimer 0 62, CRP 51 8, Ferritin 202  · Initial cardiac markers noted  · Trop <0 02,   · Mild treatment plan  · Vitamin-C, D, zinc this is day 4 of 7  · Remdesivir this is day 4 of 5  · Dexamethasone IV - Day 4/10  · Of note, D-Dimer as of 7/27 was 0 41, Ferritin 188  · Procalcitonin x 2 negative; patient is afebrile, patient with leukopenia however, remaining stable  Antibiotics discontinued as of 7/27  Observe off    · Self prone as tolerated  · Provide oxygen supplementation and wean as tolerated - trial room air today 7/29  · Monitor clinically, temperature curve - afebrile >24 hrs

## 2021-07-29 NOTE — PROGRESS NOTES
[address] [facility] Progress Note - [place] Gum 1985, 39 y o  female MRN: [id number]  Unit/Bed#: -Kori Encounter: [id number]  Primary Care Provider: PILAR Reis   Date and time admitted to hospital: 7/26/2021  2:37 PM    * COVID-19  Assessment & Plan  · Patient presented to the ED with complaints of worsening fatigue, cough, shortness of breath  · Patient positive for covid-19  · Chest x-ray (7/26/21): Right lower lobe opacity, indicative of pneumonia  In the setting of clinically suspected/proven COVID-19, this plain film appearance while nonspecific, can be seen in cases of viral pneumonia such as COVID-19  · Initial inflammatory markers noted  · D-Dimer 0 62, CRP 51 8, Ferritin 202  · Initial cardiac markers noted  · Trop <0 02,   · Mild treatment plan  · Vitamin-C, D, zinc this is day 4 of 7  · Remdesivir this is day 4 of 5  · Dexamethasone IV - Day 4/10  · Of note, D-Dimer as of 7/27 was 0 41, Ferritin 188  · Procalcitonin x 2 negative; patient is afebrile, patient with leukopenia however, remaining stable  Antibiotics discontinued as of 7/27  Observe off  · Self prone as tolerated  · Provide oxygen supplementation and wean as tolerated - trial room air today 7/29  · Monitor clinically, temperature curve - afebrile >24 hrs    Acute respiratory failure with hypoxemia (HCC)  Assessment & Plan  · Secondary to COVID-19 pneumonia  · Remains O2 sat >92%, will wean O2 as able    Chest pain  Assessment & Plan  · Suspect this is musculoskeletal chest pain as evidenced by worse with coughing, sneezing and heavy breathing  · Tylenol for pain management, Voltaren  · D-dimer is normal    Obesity  Assessment & Plan  · As evidenced by BMI of 32 34  · Counseled on lifestyle changes  Gastroesophageal reflux disease without esophagitis  Assessment & Plan  · As an outpatient on Nexium  Continue famotidine while here      Anxiety  Assessment & Plan  · In the past she was on hydroxyzine but currently not taking  · Consider starting patient on daily medication for anxiety due to her own health issues as well as stress at home  VTE Pharmacologic Prophylaxis: VTE Score: 7 High Risk (Score >/= 5) - Pharmacological DVT Prophylaxis Ordered: enoxaparin (Lovenox)  Sequential Compression Devices Ordered  Patient Centered Rounds: I evaluated the patient without nursing staff present due to COVID precautions   Discussions with Specialists or Other Care Team Provider:     Education and Discussions with Family / Patient: Patient declined call to   Time Spent for Care: 20 minutes  More than 50% of total time spent on counseling and coordination of care as described above  Current Length of Stay: 3 day(s)  Current Patient Status: Inpatient   Certification Statement: The patient will continue to require additional inpatient hospital stay due to complete remdesivir, today is day   Discharge Plan: Anticipate discharge in 24-48 hrs to home  Code Status: Level 1 - Full Code    Subjective:   CC "My heart rate was low, this thing keeps going off"    Patient seen this morning with, she reports not feeling any better, but not any worse  She is really nervous about going home  She is also nervous about her  being home by himself  She does report feeling anxious overall  Objective:     Vitals:   Temp (24hrs), Av 9 °F (36 6 °C), Min:97 6 °F (36 4 °C), Max:98 3 °F (36 8 °C)    Temp:  [97 6 °F (36 4 °C)-98 3 °F (36 8 °C)] 97 6 °F (36 4 °C)  HR:  [42-74] 56  Resp:  [16-20] 16  BP: (111-134)/(68-95) 116/85  SpO2:  [95 %-99 %] 98 %  Body mass index is 32 34 kg/m²  Input and Output Summary (last 24 hours): Intake/Output Summary (Last 24 hours) at 2021 0820  Last data filed at 2021 0234  Gross per 24 hour   Intake 1500 ml   Output --   Net 1500 ml       Physical Exam:   Physical Exam  Vitals and nursing note reviewed     Constitutional: General: She is not in acute distress  Appearance: She is ill-appearing  She is not toxic-appearing  Cardiovascular:      Rate and Rhythm: Normal rate and regular rhythm  Heart sounds: No murmur heard  Pulmonary:      Effort: Pulmonary effort is normal       Breath sounds: Examination of the right-lower field reveals rales  Examination of the left-lower field reveals rales  Rales present  Comments: Room Air and saturating 98-99%   Abdominal:      General: Bowel sounds are normal  There is no distension  Palpations: Abdomen is soft  Neurological:      Mental Status: She is alert and oriented to person, place, and time     Psychiatric:         Mood and Affect: Mood normal           Additional Data:     Labs:  Results from last 7 days   Lab Units 07/29/21  0442 07/26/21  1517 07/25/21  0904   WBC Thousand/uL 8 31 3 81* 4 31   HEMOGLOBIN g/dL 12 7 13 2 13 6   HEMATOCRIT % 38 8 41 0 41 7   PLATELETS Thousands/uL 326 223 185   BANDS PCT %  --  12*  --    NEUTROS PCT %  --   --  63   LYMPHS PCT %  --   --  30   LYMPHO PCT %  --  39  --    MONOS PCT %  --   --  7   MONO PCT %  --  9  --    EOS PCT %  --  0 0     Results from last 7 days   Lab Units 07/29/21  0443   SODIUM mmol/L 141   POTASSIUM mmol/L 4 3   CHLORIDE mmol/L 106   CO2 mmol/L 26   BUN mg/dL 14   CREATININE mg/dL 0 72   ANION GAP mmol/L 9   CALCIUM mg/dL 8 2*   ALBUMIN g/dL 2 9*   TOTAL BILIRUBIN mg/dL 0 17*   ALK PHOS U/L 55   ALT U/L 117*   AST U/L 34   GLUCOSE RANDOM mg/dL 137     Results from last 7 days   Lab Units 07/26/21  1517   INR  0 96             Results from last 7 days   Lab Units 07/27/21  0455 07/26/21  1517 07/25/21  0904   LACTIC ACID mmol/L  --  1 2 1 2   PROCALCITONIN ng/ml <0 05 <0 05  --        Lines/Drains:  Invasive Devices     Peripheral Intravenous Line            Peripheral IV 07/26/21 Right Antecubital 2 days                  Telemetry:  Telemetry Orders (From admission, onward)             48 Hour Telemetry Monitoring  Continuous x 48 hours     Question:  Reason for 48 Hour Telemetry  Answer:  Arrhythmias Requiring Medical Therapy (eg  SVT, Vtach/fib, Bradycardia, Uncontrolled A-fib)                 Telemetry Reviewed: Normal Sinus Rhythm currently, noted episodes of sinus consuelo 39-59   Indication for Continued Telemetry Use: No indication for continued use  Will discontinue  Imaging: No pertinent imaging reviewed  Recent Cultures (last 7 days):         Last 24 Hours Medication List:   Current Facility-Administered Medications   Medication Dose Route Frequency Provider Last Rate    acetaminophen  975 mg Oral Q6H PRN Maribel Berry PA-C      ascorbic acid  1,000 mg Oral Q12H 5001 YARA Tanner MD      benzonatate  100 mg Oral TID PRN Shanti Germain PA-C      cholecalciferol  2,000 Units Oral Daily Nan Yap MD      dexamethasone  6 mg Intravenous Q24H Nan Yap MD      Diclofenac Sodium  2 g Topical 4x Daily PRN Maribel Berry PA-C      enoxaparin  40 mg Subcutaneous Daily Nan Yap MD      famotidine  20 mg Oral BID Nan Yap MD      zinc sulfate  220 mg Oral Daily Nan Yap MD      Followed by   Audrey Mo ON 8/3/2021] multivitamin-minerals  1 tablet Oral Daily Nan Yap MD      nicotine  1 patch Transdermal Daily Nan Yap MD      ondansetron  4 mg Intravenous Q6H PRN Nan Yap MD      pantoprazole  40 mg Oral Early Morning Shanti Germain [place]      remdesivir  100 mg Intravenous Q24H Nan Yap  mg (07/28/21 1808)    sodium chloride  3 mL Nebulization Once Maribel Berry PA-C      zolpidem  5 mg Oral HS PRN Shanti Germain PA-C          Today, Patient Was Seen By: Ammon Vasquez PA-C    **Please Note: This note may have been constructed using a voice recognition system  **

## 2021-07-29 NOTE — ASSESSMENT & PLAN NOTE
· In the past she was on hydroxyzine but currently not taking  · Consider starting patient on daily medication for anxiety due to her own health issues as well as stress at home

## 2021-07-30 VITALS
WEIGHT: 182.54 LBS | OXYGEN SATURATION: 97 % | RESPIRATION RATE: 17 BRPM | SYSTOLIC BLOOD PRESSURE: 111 MMHG | TEMPERATURE: 98.2 F | BODY MASS INDEX: 32.34 KG/M2 | HEIGHT: 63 IN | DIASTOLIC BLOOD PRESSURE: 71 MMHG | HEART RATE: 76 BPM

## 2021-07-30 PROCEDURE — 99239 HOSP IP/OBS DSCHRG MGMT >30: CPT | Performed by: NURSE PRACTITIONER

## 2021-07-30 RX ORDER — BENZONATATE 100 MG/1
100 CAPSULE ORAL 3 TIMES DAILY PRN
Qty: 20 CAPSULE | Refills: 0 | Status: SHIPPED | OUTPATIENT
Start: 2021-07-30

## 2021-07-30 RX ORDER — DEXAMETHASONE 2 MG/1
6 TABLET ORAL
Qty: 15 TABLET | Refills: 0 | Status: SHIPPED | OUTPATIENT
Start: 2021-07-30

## 2021-07-30 RX ORDER — NICOTINE 21 MG/24HR
1 PATCH, TRANSDERMAL 24 HOURS TRANSDERMAL DAILY
Qty: 28 PATCH | Refills: 0 | Status: SHIPPED | OUTPATIENT
Start: 2021-07-31

## 2021-07-30 RX ADMIN — PANTOPRAZOLE SODIUM 40 MG: 40 TABLET, DELAYED RELEASE ORAL at 05:44

## 2021-07-30 RX ADMIN — REMDESIVIR 100 MG: 100 INJECTION, POWDER, LYOPHILIZED, FOR SOLUTION INTRAVENOUS at 16:06

## 2021-07-30 RX ADMIN — OXYCODONE HYDROCHLORIDE AND ACETAMINOPHEN 1000 MG: 500 TABLET ORAL at 08:50

## 2021-07-30 RX ADMIN — DEXAMETHASONE SODIUM PHOSPHATE 6 MG: 4 INJECTION INTRA-ARTICULAR; INTRALESIONAL; INTRAMUSCULAR; INTRAVENOUS; SOFT TISSUE at 16:07

## 2021-07-30 RX ADMIN — ZINC SULFATE 220 MG (50 MG) CAPSULE 220 MG: CAPSULE at 08:50

## 2021-07-30 RX ADMIN — Medication 2000 UNITS: at 08:50

## 2021-07-30 RX ADMIN — FAMOTIDINE 20 MG: 20 TABLET ORAL at 08:50

## 2021-07-30 NOTE — ASSESSMENT & PLAN NOTE
· Patient presented to the ED with complaints of worsening fatigue, cough, shortness of breath  · Patient positive for covid-19  · Chest x-ray (7/26/21): Right lower lobe opacity, indicative of pneumonia  In the setting of clinically suspected/proven COVID-19, this plain film appearance while nonspecific, can be seen in cases of viral pneumonia such as COVID-19  · Initial inflammatory markers noted  · D-Dimer 0 62, CRP 51 8, Ferritin 202  · Initial cardiac markers noted  · Trop <0 02,   · Mild treatment plan  · Vitamin-C, D, zinc this is day 5 of 7; continue multivitamin on discharge  · Remdesivir this is day 5 of 5; patient will receive her last dose today at 1500  · Dexamethasone IV - Day 5/10; will send home with 5 more days to complete the regimen  · Of note, D-Dimer as of 7/27 was 0 41, Ferritin 188  · Procalcitonin x 2 negative; patient is afebrile, patient with leukopenia however, remaining stable  Antibiotics discontinued as of 7/27  Observe off  Patient has remained afebrile for over 48 hours  · Self prone as tolerated  · Nursing will walk with patient to determine if she needs a home oxygen evaluation prior to discharge

## 2021-07-30 NOTE — DISCHARGE SUMMARY
[address] [facility]     Discharge- Liam Mcleod 1985, 39 y o  female MRN: [id number]  Unit/Bed#: -01 Encounter: [id number]  Primary Care Provider: PILAR Lockhart   Date and time admitted to hospital: 7/26/2021  2:37 PM    * COVID-19  Assessment & Plan  · Patient presented to the ED with complaints of worsening fatigue, cough, shortness of breath  · Patient positive for covid-19  · Chest x-ray (7/26/21): Right lower lobe opacity, indicative of pneumonia  In the setting of clinically suspected/proven COVID-19, this plain film appearance while nonspecific, can be seen in cases of viral pneumonia such as COVID-19  · Initial inflammatory markers noted  · D-Dimer 0 62, CRP 51 8, Ferritin 202  · Initial cardiac markers noted  · Trop <0 02,   · Mild treatment plan  · Vitamin-C, D, zinc this is day 5 of 7; continue multivitamin on discharge  · Remdesivir this is day 5 of 5; patient will receive her last dose today at 1500  · Dexamethasone IV - Day 5/10; will send home with 5 more days to complete the regimen  · Of note, D-Dimer as of 7/27 was 0 41, Ferritin 188  · Procalcitonin x 2 negative; patient is afebrile, patient with leukopenia however, remaining stable  Antibiotics discontinued as of 7/27  Observe off  Patient has remained afebrile for over 48 hours  · Self prone as tolerated  · Nursing will walk with patient to determine if she needs a home oxygen evaluation prior to discharge  Chest pain  Assessment & Plan  · Suspect this is musculoskeletal chest pain as evidenced by worse with coughing, sneezing and heavy breathing  · Tylenol for pain management, Voltaren  · D-dimer is normal    Acute respiratory failure with hypoxemia (HCC)  Assessment & Plan  · Secondary to COVID-19 pneumonia  · Patient is on room air at this time, with oxygen saturation levels in the upper 90s  Anxiety  Assessment & Plan  · In the past she was on hydroxyzine but currently not taking  · Follow up with PCP    Obesity  Assessment & Plan  · As evidenced by BMI of 32 34  · Counseled on lifestyle changes  Gastroesophageal reflux disease without esophagitis  Assessment & Plan  · As an outpatient on Nexium  Medical Problems     Resolved Problems  Date Reviewed: 7/30/2021    None              Discharging Physician / Practitioner: Pita Hogan  PCP: Pita Diaz  Admission Date:   Admission Orders (From admission, onward)     Ordered        07/26/21 1612  Inpatient Admission  Once                   Discharge Date: 07/30/21    Consultations During Hospital Stay:  None    Procedures Performed:   · None    Significant Findings / Test Results:   XR chest 1 view portable   ED Interpretation by Charissa Hogan MD (07/26 7374)   Right lower lobe infiltrate  No CHF      Final Result by Kenny Rudd MD (07/26 6032)      Right lower lobe opacity, indicative of pneumonia  In the setting of clinically suspected/proven COVID-19, this plain film appearance while nonspecific, can be seen in cases of viral pneumonia such as COVID-19  Workstation performed: [id number]             Incidental Findings:   · None    Test Results Pending at Discharge (will require follow up): · None     Outpatient Tests Requested:  · Follow up with PCP within 1 week    Complications:  None    Reason for Admission:  Acute respiratory failure with hypoxia, COVID-19 infection    Hospital Course:   Gila Delacruz is a 39 y o  female patient who originally presented to the hospital on 7/26/2021 due to worsening fatigue, cough, shortness of breath and chills  Patient was diagnosed with COVID-19 on 07/15  Patient was noted to be in the 80% oxygen saturation in the ER  Patient was admitted and maintained on the mild COVID-19 pathway, finished a full course of remdesivir x5 days as well as 5 days worth of IV dexamethasone    Patient was given IV ceftriaxone and doxycycline which were discontinued after 2-procalcitonin's  Patient has remained afebrile times 48 hours  Patient was requiring supplemental oxygen but has been successfully weaned and maintaining oxygen saturation levels not upper 90s  Patient is stable for discharge  Vital signs are stable and labs are stable  Please see above list of diagnoses and related plan for additional information  Condition at Discharge: stable    Discharge Day Visit / Exam:   Subjective:  Patient resting in bed, denies any complaints of chest pain, shortness of breath, fever, or chills  Is worried about going home but wants to go home  Vitals: Blood Pressure: 130/80 (07/30/21 0747)  Pulse: 59 (07/30/21 0747)  Temperature: 98 °F (36 7 °C) (07/30/21 0747)  Temp Source: Oral (07/30/21 0747)  Respirations: 17 (07/29/21 1129)  Height: 5' 3" (160 cm) (07/26/21 1911)  Weight - Scale: 82 8 kg (182 lb 8 7 oz) (07/26/21 1911)  SpO2: 94 % (07/30/21 0747)     Exam:   Physical Exam  Vitals and nursing note reviewed  Constitutional:       General: She is not in acute distress  Appearance: She is normal weight  She is ill-appearing  Cardiovascular:      Rate and Rhythm: Normal rate  Pulses: Normal pulses  Heart sounds: Normal heart sounds  Pulmonary:      Effort: Pulmonary effort is normal       Breath sounds: Decreased breath sounds present  Abdominal:      General: Bowel sounds are normal       Palpations: Abdomen is soft  Musculoskeletal:         General: Normal range of motion  Skin:     General: Skin is warm and dry  Capillary Refill: Capillary refill takes less than 2 seconds  Neurological:      Mental Status: She is alert and oriented to person, place, and time  Psychiatric:         Mood and Affect: Mood normal           Discussion with Family: Patient declined call to   Discharge instructions/Information to patient and family:   See after visit summary for information provided to patient and family  Provisions for Follow-Up Care:  See after visit summary for information related to follow-up care and any pertinent home health orders  Disposition:   Home    Planned Readmission: None     Discharge Statement:  I spent 35 minutes discharging the patient  This time was spent on the day of discharge  I had direct contact with the patient on the day of discharge  Greater than 50% of the total time was spent examining patient, answering all patient questions, arranging and discussing plan of care with patient as well as directly providing post-discharge instructions  Additional time then spent on discharge activities  Discharge Medications:  See after visit summary for reconciled discharge medications provided to patient and/or family        **Please Note: This note may have been constructed using a voice recognition system**

## 2021-07-30 NOTE — DISCHARGE INSTRUCTIONS
How to Recover from COVID-19 at [address]:   What you need to know about recovering from COVID-19 at home:  COVID-19 can cause a range of symptoms, from mild to severe  If you do not need to be treated in a hospital, you will be given instructions to use at home  You will need to watch for worsening symptoms and seek immediate care if needed  You will also need to stay physically apart from others so you do not spread the virus to anyone  Information about COVID-19 is still being learned  It is not known if a person can be infected with the virus again after recovering from COVID-19  It is also not known if or for how long the virus can continue to be passed to others  If you think someone in your home may be infected,  do the following to protect others:  · If emergency care is needed,  tell the  about the possible infection, or call ahead and tell the emergency department  · Call a healthcare provider  for instructions if symptoms are mild  Anyone who may be infected should not  arrive without calling first  The provider will need to protect staff members and other patients  · The person who may be infected needs to wear a face covering  while getting medical care  This will help lower the risk of infecting others  Coverings are not used for anyone who is younger than 2 years, has breathing problems, or cannot remove it  The provider can give you instructions for anyone who cannot wear a covering  Call your local emergency number (911 in the [address] [place] [address],[address]) or an emergency department if:   · You have trouble breathing or shortness of breath at rest     · You have chest pain or pressure that lasts longer than 5 minutes  · You become confused or hard to wake  · Your lips or face are blue  · You have a fever of 104°F (40°C) or higher  Call your doctor if:   · You have new, returning, or worsening symptoms      · Someone in your home does not  have symptoms of COVID-19 but had close physical contact within 14 days with you  · You have questions or concerns about your condition or care  Self-care:  Mild symptoms may get better on their own  The following may be used to manage your symptoms:  · Decongestants  help reduce nasal congestion and help you breathe more easily  If you take decongestant pills, they may make you feel restless or cause problems with your sleep  Do not use decongestant sprays for more than a few days  · Cough suppressants  help reduce coughing  Ask your healthcare provider which type of cough medicine is best for you  · To soothe a sore throat,  gargle with warm salt water, or use throat lozenges or a throat spray  Your healthcare provider may recommend a cough medicine  Drink more liquids to thin and loosen mucus and to prevent dehydration  Use decongestants or saline drops as directed for nasal congestion  · NSAIDs , such as ibuprofen, help decrease swelling, pain, and fever  NSAIDs can cause stomach bleeding or kidney problems in certain people  If you take blood thinner medicine, always ask your healthcare provider if NSAIDs are safe for you  Always read the medicine label and follow directions  · Acetaminophen  decreases pain and fever  It is available without a doctor's order  Ask how much to take and how often to take it  Follow directions  Read the labels of all other medicines you are using to see if they also contain acetaminophen, or ask your doctor or pharmacist  Acetaminophen can cause liver damage if not taken correctly  Do not use more than 4 grams (4,000 milligrams) total of acetaminophen in one day  Keep others safe while you are recovering at home:  Healthcare providers will give you specific instructions to follow  The following are general guidelines to remind you how to keep others safe until you are well:     · Wash your hands often  Use soap and water as much as possible   You can use hand  that contains alcohol if soap and water are not available  Do not share towels with anyone  If you use paper towels, throw them away in a lined trash can kept in your room or area  Use a covered trash can, if possible  · Cover sneezes and coughs  Turn your face away and cover your mouth and nose with a tissue  Throw the tissue away  Use the bend of your arm if a tissue is not available  Then wash your hands well with soap and water or use hand   · Wear a face covering (mask) around anyone who does not live in your home  A covering will help prevent you from passing the virus to others  It is not known for sure if or for how long the virus can be passed after recovery  Do not  wear a plastic face shield instead of a covering  Use a cloth covering with at least 2 layers  You can also create layers by putting a cloth covering over a disposable non-medical mask  Cover your mouth and your nose  The covering should fit snugly against the bridge of your nose  Securely fasten it under your chin and on the sides of your face  A face covering is not a substitute for other safety measures  Continue social distancing and washing your hands often  · Do not go out of your home unless it is necessary  If possible, ask someone who is not infected to go out for groceries, medicines, and household items  Ask your healthcare provider for other ways to have appointments  Some providers offer phone, video, or other types of appointments  If you need to be seen in person, call ahead to make sure the office will be ready for you  · Do not let anyone into your home, room, or area unless it is necessary  If possible, stay in a separate area or room of your home if you live with others  No one should go into the area or room except to give you care  Only allow medical professionals or other necessary helpers in  Wear a face covering  Remind them to wear face coverings and to wash their hands   If possible, ask someone who is well to care for your baby  You can put breast milk in bottles for the person to use, if needed  Wear a clean face covering if you need to breastfeed or express or pump breast milk  Family members and friends should not visit you  You can visit with others by phone, video chat, e-mail, or similar systems  It is important to stay connected with others in your life while you recover  · Talk to your healthcare provider about your baby  Tell him or her if you have any questions or concerns about caring for or bonding with your baby  He or she will tell you when to bring your baby in for check-ups and vaccines  He or she will also tell you what to do if you think your baby was infected with the coronavirus  · Do not handle live animals unless it is necessary  Until more is known, it is best not to touch, play with, or handle live animals  Some animals, including pets, have been infected with the new coronavirus  Do not handle or care for animals until you are well  Care includes feeding, petting, and cuddling your pet  Do not let your pet lick you or share your food  Ask someone who is not infected to take care of your pet, if possible  If you must care for a pet, wear a face covering  Wash your hands before and after you give care  Talk to your healthcare provider about how to keep a service animal safe, if needed  · Follow directions from your healthcare provider for being around others after you recover  It is not known for sure if or for how long a recovered person can pass the virus to others  Your provider may give you instructions, such as continuing social distancing or wearing a face covering around others  The following are general guidelines for when you can be around others:    ? If you never developed any symptoms,  wait at least 10 days after your positive test  Your provider may want you to have 2 negative tests in a row at least 24 hours apart  This depends on how available testing is in your area  ?  If you did have symptoms,  wait at least 10 days after the symptoms first appeared  Then you will need to have no fever for 24 hours without fever medicine  Most of your symptoms will also need to be gone  A loss of taste or smell may continue for several months  It is considered okay to be around others if this is your only symptom  ? If you were hospitalized for COVID-19 and needed oxygen,  your provider will tell you how long to wait  You may need to wait until 20 days after symptoms appeared  It may be less if you have 2 negative tests in a row at least 24 hours apart  This will depend on how available testing is in your area  Follow up with your doctor as directed:  Write down your questions so you remember to ask them during your visits  For more information:   · Centers for Disease Control and Prevention  [address] , [address]  Phone: 6- 711 - [phone]  Web Address: [employer]    © 4862 [facility] 2021 Information is for End User's use only and may not be sold, redistributed or otherwise used for commercial purposes  All illustrations and images included in CareNotes® are the copyrighted property of A D A M , Inc  or [address] [place], [place] [address]jayshree nichelle   The above information is an  only  It is not intended as medical advice for individual conditions or treatments  Talk to your doctor, nurse or pharmacist before following any medical regimen to see if it is safe and effective for you

## 2021-07-30 NOTE — ASSESSMENT & PLAN NOTE
· Secondary to COVID-19 pneumonia  · Patient is on room air at this time, with oxygen saturation levels in the upper 90s

## 2021-07-30 NOTE — PROGRESS NOTES
Walked around room with no oxygen, pulse ox remained above 96%  Pt was SOB and felt dizzy but remained above 96%

## 2021-08-02 ENCOUNTER — TRANSITIONAL CARE MANAGEMENT (OUTPATIENT)
Dept: FAMILY MEDICINE CLINIC | Facility: CLINIC | Age: 36
End: 2021-08-02

## 2021-08-02 NOTE — UTILIZATION REVIEW
Notification of Discharge   This is a Notification of Discharge from our facility [address]  Please be advised that this patient has been discharge from our facility  Below you will find the admission and discharge date and time including the patients disposition  UTILIZATION REVIEW CONTACT:  Isha Leon  Utilization   Network Utilization Review Department  Phone: [phone] x carefully listen to the prompts  All voicemails are confidential   Email: [email or web address]     PHYSICIAN ADVISORY SERVICES:  FOR [id number] REVIEW - MEDICAL NECESSITY DENIAL  Phone: [phone]  Fax: [phone]  Email: [email or web address]     PRESENTATION DATE: 7/26/2021  2:37 PM  OBERVATION ADMISSION DATE:   INPATIENT ADMISSION DATE: 7/26/21  4:12 PM   DISCHARGE DATE: 7/30/2021  6:10 PM  DISPOSITION: Home/Self Care Home/Self Care      IMPORTANT INFORMATION:  Send all requests for admission clinical reviews, approved or denied determinations and any other requests to dedicated fax number below belonging to the campus where the patient is receiving treatment   List of dedicated fax numbers:  [address] [place] [address] DENIALS (Administrative/Medical Necessity) [phone]   [address] (Maternity/NICU/Pediatrics) [phone]   Cierra Gamez [phone]   Sean Mckeon [phone]   [facility] [phone]   [facility] [address] [place] [address] [phone]   [facility]  [phone]   [address] [place] [address], [address]  [address] [facility] [address] [facility] [phone]

## 2021-08-05 ENCOUNTER — OFFICE VISIT (OUTPATIENT)
Dept: FAMILY MEDICINE CLINIC | Facility: CLINIC | Age: 36
End: 2021-08-05
Payer: COMMERCIAL

## 2021-08-05 VITALS — BODY MASS INDEX: 32.25 KG/M2 | HEIGHT: 63 IN | WEIGHT: 182 LBS

## 2021-08-05 DIAGNOSIS — Z76.89 ENCOUNTER FOR SUPPORT AND COORDINATION OF TRANSITION OF CARE: Primary | ICD-10-CM

## 2021-08-05 DIAGNOSIS — U07.1 COVID-19: ICD-10-CM

## 2021-08-05 PROCEDURE — 99495 TRANSJ CARE MGMT MOD F2F 14D: CPT | Performed by: NURSE PRACTITIONER

## 2021-08-05 PROCEDURE — 3008F BODY MASS INDEX DOCD: CPT | Performed by: NURSE PRACTITIONER

## 2021-08-05 PROCEDURE — 1111F DSCHRG MED/CURRENT MED MERGE: CPT | Performed by: NURSE PRACTITIONER

## 2021-08-05 NOTE — PROGRESS NOTES
Virtual TCM Visit:    Verification of patient location:    Patient is located in the following state in which I hold an active license [place]        Assessment/Plan:        Problem List Items Addressed This Visit     COVID-19      Other Visit Diagnoses     Encounter for support and coordination of transition of care    -  Primary             Reason for visit is TCM    Encounter provider PILAR Murphy       Provider located at [address] [facility]  [address] [facility] [address] [facility]  [phone]      Recent Visits  No visits were found meeting these conditions  Showing recent visits within past 7 days and meeting all other requirements  Today's Visits  Date Type Provider Dept   08/05/21 Office Visit PILAR Murphy [facility]   Showing today's visits and meeting all other requirements  Future Appointments  No visits were found meeting these conditions  Showing future appointments within next 150 days and meeting all other requirements       After connecting through [email or web address], the patient was identified by name and date of birth  Sera Stewart was informed that this is a telemedicine visit and that the visit is being conducted through [email or web address] [place] and patient was informed that this is a secure, HIPAA-compliant platform  She agrees to proceed     My office door was closed  No one else was in the room  She acknowledged consent and understanding of privacy and security of the video platform  The patient has agreed to participate and understands they can discontinue the visit at any time  Patient is aware this is a billable service  Subjective:     Patient ID: Sera Stewart is a 39 y o  female  Sera Stewart present for TCM s/p hospitalization  For COVID-19  She was at a family BB, most family members at the [facility] have contacted [phone]  Pt was hospitalized from 7/26-7/30 and received infusions of antivirals, steroids, and Abx   She states she has not have COVID symptoms in about 1 week but does still feel SOB with ambulation  She has an IS which she uses 1-2 times a daily  Pt is advised she should be using this more often to increase lung compliance and reduce atelectatics  Pt will start using IS more often  She continues with tessalon pearls and steroid taper  Pt would like to RTW Monday 8/9/2021  Review of Systems   Respiratory: Positive for cough (in AM and after IS) and shortness of breath (intermittent)  Objective:    Vitals:    08/05/21 0829   Weight: 82 6 kg (182 lb)   Height: 5' 3" (1 6 m)       Physical Exam  Vitals and nursing note reviewed  Constitutional:       Appearance: Normal appearance  HENT:      Head: Normocephalic  Pulmonary:      Effort: Pulmonary effort is normal    Skin:     General: Skin is dry  Capillary Refill: Capillary refill takes less than 2 seconds  Neurological:      General: No focal deficit present  Mental Status: She is alert and oriented to person, place, and time  Psychiatric:         Mood and Affect: Mood normal              Transitional Care Management Review:  Malena Melara is a 39 y o  female here for TCM follow up  During the TCM phone call patient stated:    TCM Call (since 7/5/2021)     Date and time call was made  8/2/2021  9:21 AM    Hospital care reviewed  Records reviewed    Patient was hospitialized at  [facility]        Date of Admission  07/26/21    Date of discharge  07/30/21    Diagnosis  COVID-19    Disposition  Home      TCM Call (since 7/5/2021)     Scheduled for follow up?   Yes    Did you obtain your prescribed medications  Yes    Do you need help managing your prescriptions or medications  No    Is transportation to your appointment needed  No    I have advised the patient to call PCP with any new or worsening symptoms  Tamara HALE     Are you recieving any outpatient services  No    Are you recieving home care services  No    Are you using any community resources  No    Current waiver services  No    Have you fallen in the last 12 months  No    Interperter language line needed  No    Counseling  Patient          I spent 21 minutes with the patient during this visit  Dioni [place], PILAR      VIRTUAL VISIT DISCLAIMER    Bozena Pickens verbally agrees to participate in [employer]  Pt is aware that [employer] could be limited without vital signs or the ability to perform a full hands-on physical Shanon Kehr understands she or the provider may request at any time to terminate the video visit and request the patient to seek care or treatment in person

## 2021-08-05 NOTE — LETTER
August 5, 2021    Patient: Addie Walker  YOB: 1985  Date of Last Encounter: Visit date not found      To whom it may concern:     Addie Walker has tested positive for COVID-19 (Coronavirus)  She may return to work on 8/9/2021, which is 10 days from illness onset (provided symptoms are improving) and 24 hours without fever      Sincerely,         PILAR Gerardo                                                 Please email

## 2022-03-19 ENCOUNTER — OFFICE VISIT (OUTPATIENT)
Dept: URGENT CARE | Facility: CLINIC | Age: 37
End: 2022-03-19
Payer: COMMERCIAL

## 2022-03-19 VITALS
SYSTOLIC BLOOD PRESSURE: 112 MMHG | DIASTOLIC BLOOD PRESSURE: 78 MMHG | TEMPERATURE: 96.9 F | OXYGEN SATURATION: 98 % | RESPIRATION RATE: 14 BRPM | HEART RATE: 101 BPM

## 2022-03-19 DIAGNOSIS — J02.9 SORE THROAT: Primary | ICD-10-CM

## 2022-03-19 DIAGNOSIS — H92.02 ACUTE OTALGIA, LEFT: ICD-10-CM

## 2022-03-19 LAB — S PYO AG THROAT QL: NEGATIVE

## 2022-03-19 PROCEDURE — 99213 OFFICE O/P EST LOW 20 MIN: CPT | Performed by: PHYSICIAN ASSISTANT

## 2022-03-19 PROCEDURE — 87880 STREP A ASSAY W/OPTIC: CPT | Performed by: PHYSICIAN ASSISTANT

## 2022-03-19 RX ORDER — OMEPRAZOLE 20 MG/1
20 CAPSULE, DELAYED RELEASE ORAL DAILY
COMMUNITY

## 2022-03-19 NOTE — PROGRESS NOTES
[facility]        NAME: Melia Torre is a 40 y o  female  : 1985    MRN: [id number]  DATE: 2022  TIME: 11:40 AM    Assessment and Plan   Sore throat [J02 9]  1  Sore throat  POCT rapid strepA   2  Acute otalgia, left           Patient Instructions   Patient Instructions   Sore Throat, Ambulatory Care   GENERAL INFORMATION:   A sore throat  is often caused by a cold or flu virus  A sore throat may also be caused by bacteria such as strep  Other causes include smoking, a runny nose, allergies, or acid reflux  Seek immediate care for the following symptoms:   · Trouble breathing or swallowing because your throat is swollen or sore    · Drooling because it hurts too much to swallow    · A painful lump in your throat that does not go away after 5 days    · A fever higher than 102? F (39?C) or lasts longer than 3 days    · Confusion    · Blood in your throat or ear  Treatment for a sore throat  will depend on the cause how severe it is  A sore throat cause by a virus will go away on its own without treatment  You will need antibiotics if your sore throat is caused by bacteria  Your sore throat should start to feel better within 3 to 5 days for both viral and bacterial infections  Care for your sore throat:   · Gargle with salt water  Mix ¼ teaspoon salt in a glass of warm water and gargle  This may help reduce swelling in your throat  · Take ibuprofen or acetaminophen:  These medicines decrease pain and fever  They are available without a doctor's order  Ask your healthcare provider which medicine is best for you  Ask how much to take and how often to take it  · Drink more liquids  Cold or warm drinks may help soothe your sore throat  Drinking liquids can also help prevent dehydration  · Use a cool-steam humidifier  to help moisten the air in your room and reduce your throat pain  · Use lozenges, ice, soft foods, or popsicles  to soothe your throat      · Rest your throat as much as possible  Try not to use your voice  This may irritate your throat and worsen your symptoms  Follow up with your healthcare provider as directed:  Write down your questions so you remember to ask them during your visits  CARE AGREEMENT:   You have the right to help plan your care  Learn about your health condition and how it may be treated  Discuss treatment options with your caregivers to decide what care you want to receive  You always have the right to refuse treatment  The above information is an  only  It is not intended as medical advice for individual conditions or treatments  Talk to your doctor, nurse or pharmacist before following any medical regimen to see if it is safe and effective for you  © 2014 [address] is for End User's use only and may not be sold, redistributed or otherwise used for commercial purposes  All illustrations and images included in CareNotes® are the copyrighted property of [employer]  or Martin Alvarez  Follow up with PCP in 3-5 days  Proceed to  ER if symptoms worsen  Chief Complaint     Chief Complaint   Patient presents with    Sore Throat     x3 days  Daughter had sore throat, but no offical dx  No fever   Earache     left ear x3 days         History of Present Illness       Patient presents with sore throat and left ear pain for the past 2 days  Left ear started last night  Also congestion for the past two days  Denies fever, cough, chest pains, SOB, palpitations, drainage from the ear, or sick contacts  Review of Systems   Review of Systems   Constitutional: Negative for chills, fatigue and fever  HENT: Positive for congestion, ear pain and sore throat  Negative for ear discharge, postnasal drip, rhinorrhea, sinus pressure and sinus pain  Respiratory: Negative for cough, chest tightness, shortness of breath and wheezing  Cardiovascular: Negative for chest pain  Musculoskeletal: Negative for arthralgias and myalgias  Neurological: Negative for weakness  Psychiatric/Behavioral: Negative for confusion           Current Medications       Current Outpatient Medications:     omeprazole (PriLOSEC) 20 mg delayed release capsule, Take 20 mg by mouth daily, Disp: , Rfl:     Ascorbic Acid (vitamin C) 1000 MG tablet, Take 1 tablet (1,000 mg total) by mouth every 12 (twelve) hours (Patient not taking: Reported on 3/19/2022 ), Disp: 20 tablet, Rfl: 0    benzonatate (TESSALON PERLES) 100 mg capsule, Take 1 capsule (100 mg total) by mouth 3 (three) times a day as needed for cough (Patient not taking: Reported on 3/19/2022 ), Disp: 20 capsule, Rfl: 0    cholecalciferol (VITAMIN D3) 1,000 units tablet, Take 2 tablets (2,000 Units total) by mouth daily (Patient not taking: Reported on 3/19/2022 ), Disp: 10 tablet, Rfl: 0    dexamethasone (DECADRON) 2 mg tablet, Take 3 tablets (6 mg total) by mouth daily with breakfast for 5 days (Patient not taking: Reported on 3/19/2022 ), Disp: 15 tablet, Rfl: 0    ergocalciferol (VITAMIN D2) 50,000 units, Take 1 capsule (50,000 Units total) by mouth once a week (Patient not taking: Reported on 6/2/2021), Disp: 12 capsule, Rfl: 1    esomeprazole (NexIUM) 40 MG capsule, take 1 capsule by mouth daily BEFORE BREAKFAST (Patient not taking: Reported on 3/19/2022), Disp: 90 capsule, Rfl: 1    Multiple Vitamin (multivitamin) tablet, Take 1 tablet by mouth daily (Patient not taking: Reported on 3/19/2022 ), Disp: 30 tablet, Rfl: 0    nicotine (NICODERM CQ) 21 mg/24 hr TD 24 hr patch, Place 1 patch on the skin daily (Patient not taking: Reported on 8/5/2021), Disp: 28 patch, Rfl: 0    ondansetron (ZOFRAN-ODT) 4 mg disintegrating tablet, Take 1 tablet (4 mg total) by mouth every 8 (eight) hours as needed for nausea or vomiting (Patient not taking: Reported on 3/19/2022 ), Disp: 12 tablet, Rfl: 0    Current Allergies     Allergies as of 03/19/2022 - Reviewed 2022   Allergen Reaction Noted    Reglan [metoclopramide] Myalgia 2020            The following portions of the patient's history were reviewed and updated as appropriate: allergies, current medications, past family history, past medical history, past social history, past surgical history and problem list      Past Medical History:   Diagnosis Date    Acid reflux     COVID-19     GERD (gastroesophageal reflux disease)     Hyperlipidemia        Past Surgical History:   Procedure Laterality Date     SECTION      DILATION AND CURETTAGE OF UTERUS      2x    TONSILECTOMY AND ADNOIDECTOMY      TONSILLECTOMY      TUBAL LIGATION         Family History   Problem Relation Age of Onset    Heart disease Mother     No Known Problems Father          Medications have been verified  Objective   /78   Pulse 101   Temp (!) 96 9 °F (36 1 °C)   Resp 14   SpO2 98%        Physical Exam     Physical Exam  Constitutional:       Appearance: Normal appearance  HENT:      Head: Normocephalic and atraumatic  Right Ear: Tympanic membrane, ear canal and external ear normal       Left Ear: Tympanic membrane, ear canal and external ear normal       Nose: Nose normal       Mouth/Throat:      Mouth: Mucous membranes are moist       Pharynx: Oropharynx is clear  No posterior oropharyngeal erythema  Tonsils: No tonsillar exudate  0 on the right  0 on the left  Musculoskeletal:      Cervical back: No muscular tenderness  Neurological:      Mental Status: She is alert     Psychiatric:         Mood and Affect: Mood normal          Behavior: Behavior normal

## 2022-03-19 NOTE — PATIENT INSTRUCTIONS
Sore Throat, Ambulatory Care   GENERAL INFORMATION:   A sore throat  is often caused by a cold or flu virus  A sore throat may also be caused by bacteria such as strep  Other causes include smoking, a runny nose, allergies, or acid reflux  Seek immediate care for the following symptoms:   · Trouble breathing or swallowing because your throat is swollen or sore    · Drooling because it hurts too much to swallow    · A painful lump in your throat that does not go away after 5 days    · A fever higher than 102? F (39?C) or lasts longer than 3 days    · Confusion    · Blood in your throat or ear  Treatment for a sore throat  will depend on the cause how severe it is  A sore throat cause by a virus will go away on its own without treatment  You will need antibiotics if your sore throat is caused by bacteria  Your sore throat should start to feel better within 3 to 5 days for both viral and bacterial infections  Care for your sore throat:   · Gargle with salt water  Mix ¼ teaspoon salt in a glass of warm water and gargle  This may help reduce swelling in your throat  · Take ibuprofen or acetaminophen:  These medicines decrease pain and fever  They are available without a doctor's order  Ask your healthcare provider which medicine is best for you  Ask how much to take and how often to take it  · Drink more liquids  Cold or warm drinks may help soothe your sore throat  Drinking liquids can also help prevent dehydration  · Use a cool-steam humidifier  to help moisten the air in your room and reduce your throat pain  · Use lozenges, ice, soft foods, or popsicles  to soothe your throat  · Rest your throat as much as possible  Try not to use your voice  This may irritate your throat and worsen your symptoms  Follow up with your healthcare provider as directed:  Write down your questions so you remember to ask them during your visits  CARE AGREEMENT:   You have the right to help plan your care   Learn about your health condition and how it may be treated  Discuss treatment options with your caregivers to decide what care you want to receive  You always have the right to refuse treatment  The above information is an  only  It is not intended as medical advice for individual conditions or treatments  Talk to your doctor, nurse or pharmacist before following any medical regimen to see if it is safe and effective for you  © 2014 [address] is for End User's use only and may not be sold, redistributed or otherwise used for commercial purposes  All illustrations and images included in CareNotes® are the copyrighted property of A D [employer] M , Inc  or Martin Alvarez

## 2022-04-21 DIAGNOSIS — K21.9 GASTROESOPHAGEAL REFLUX DISEASE WITHOUT ESOPHAGITIS: ICD-10-CM

## 2022-04-21 RX ORDER — ESOMEPRAZOLE MAGNESIUM 40 MG/1
CAPSULE, DELAYED RELEASE ORAL
Qty: 90 CAPSULE | Refills: 1 | Status: SHIPPED | OUTPATIENT
Start: 2022-04-21

## 2022-05-19 ENCOUNTER — OFFICE VISIT (OUTPATIENT)
Dept: URGENT CARE | Facility: CLINIC | Age: 37
End: 2022-05-19
Payer: COMMERCIAL

## 2022-05-19 VITALS
TEMPERATURE: 98 F | RESPIRATION RATE: 20 BRPM | SYSTOLIC BLOOD PRESSURE: 126 MMHG | BODY MASS INDEX: 33.84 KG/M2 | OXYGEN SATURATION: 98 % | HEIGHT: 63 IN | WEIGHT: 191 LBS | DIASTOLIC BLOOD PRESSURE: 70 MMHG | HEART RATE: 74 BPM

## 2022-05-19 DIAGNOSIS — R05.1 ACUTE COUGH: ICD-10-CM

## 2022-05-19 DIAGNOSIS — R30.0 DYSURIA: ICD-10-CM

## 2022-05-19 DIAGNOSIS — N30.01 ACUTE CYSTITIS WITH HEMATURIA: ICD-10-CM

## 2022-05-19 DIAGNOSIS — J06.9 UPPER RESPIRATORY INFECTION, ACUTE: ICD-10-CM

## 2022-05-19 LAB
SL AMB  POCT GLUCOSE, UA: NORMAL
SL AMB LEUKOCYTE ESTERASE,UA: NORMAL
SL AMB POCT BILIRUBIN,UA: NORMAL
SL AMB POCT BLOOD,UA: NORMAL
SL AMB POCT CLARITY,UA: CLEAR
SL AMB POCT COLOR,UA: NORMAL
SL AMB POCT KETONES,UA: NORMAL
SL AMB POCT NITRITE,UA: NORMAL
SL AMB POCT PH,UA: 5
SL AMB POCT SPECIFIC GRAVITY,UA: 1.01
SL AMB POCT URINE HCG: NEGATIVE
SL AMB POCT URINE PROTEIN: 300
SL AMB POCT UROBILINOGEN: 0.2

## 2022-05-19 PROCEDURE — U0005 INFEC AGEN DETEC AMPLI PROBE: HCPCS | Performed by: NURSE PRACTITIONER

## 2022-05-19 PROCEDURE — 81025 URINE PREGNANCY TEST: CPT | Performed by: NURSE PRACTITIONER

## 2022-05-19 PROCEDURE — U0003 INFECTIOUS AGENT DETECTION BY NUCLEIC ACID (DNA OR RNA); SEVERE ACUTE RESPIRATORY SYNDROME CORONAVIRUS 2 (SARS-COV-2) (CORONAVIRUS DISEASE [COVID-19]), AMPLIFIED PROBE TECHNIQUE, MAKING USE OF HIGH THROUGHPUT TECHNOLOGIES AS DESCRIBED BY CMS-2020-01-R: HCPCS | Performed by: NURSE PRACTITIONER

## 2022-05-19 PROCEDURE — 87086 URINE CULTURE/COLONY COUNT: CPT | Performed by: NURSE PRACTITIONER

## 2022-05-19 PROCEDURE — 81002 URINALYSIS NONAUTO W/O SCOPE: CPT | Performed by: NURSE PRACTITIONER

## 2022-05-19 PROCEDURE — 99213 OFFICE O/P EST LOW 20 MIN: CPT | Performed by: NURSE PRACTITIONER

## 2022-05-19 RX ORDER — CEPHALEXIN 500 MG/1
500 CAPSULE ORAL EVERY 12 HOURS SCHEDULED
Qty: 14 CAPSULE | Refills: 0 | Status: SHIPPED | OUTPATIENT
Start: 2022-05-19 | End: 2022-05-26

## 2022-05-19 NOTE — PROGRESS NOTES
[facility]        NAME: Isabel Maciel is a 40 y o  female  : 1985    MRN: [id number]  DATE: May 19, 2022  TIME: 9:29 AM    Assessment and Plan   No primary diagnosis found  1  Acute cystitis with hematuria  cephalexin (KEFLEX) 500 mg capsule   2  Dysuria  POCT urine dip    POCT urine HCG    Urine culture    cephalexin (KEFLEX) 500 mg capsule   3  Acute cough  COVID Only -Office Collect    cephalexin (KEFLEX) 500 mg capsule   4  Upper respiratory infection, acute  cephalexin (KEFLEX) 500 mg capsule         Patient Instructions       Follow up with PCP in 3-5 days  Proceed to  ER if symptoms worsen  You have been prescribed cephalexin for UTI and upper respiratory symptoms  You are to take ALL as prescribed  You are to increase water  You have a covid testing and urine culture pending - you are to download [employer] for the results in 24-72 hours, you will be notified if they are abnormal   You are to get an OTC product for symptom relief as well  You are to follow up with your PCP  Go to the ED if symptoms         Chief Complaint     Chief Complaint   Patient presents with    Cough     X 4 days     Possible UTI     X 4 days          History of Present Illness       This is a 40year old female who states that she has had cold symptoms x 4 days with productive green mucous cough with sinus pressure, SOB and sorethroat  Denies ear pain, cp, fevers, chills, n/v/d  Taking nothing for cough except allergy pills with no relief  She also has had UTI symptoms x 4 days as well and has been taking azo   + burning and hesitation  No flank pain, urgency, odor  + blood - has menses  Denies pregnancy tubal ligation   + smoker  Pt had covid in August, she is not vaccinated  She was in the hospital x 1 week for covid  Review of Systems   Review of Systems   Constitutional: Negative  HENT: Positive for congestion, sinus pressure, sinus pain and sore throat  Eyes: Negative  Respiratory: Positive for cough  Cardiovascular: Negative  Gastrointestinal: Negative  Endocrine: Negative  Genitourinary: Positive for dysuria  Musculoskeletal: Negative  Skin: Negative  Neurological: Negative  Hematological: Negative  Psychiatric/Behavioral: Negative            Current Medications       Current Outpatient Medications:     cephalexin (KEFLEX) 500 mg capsule, Take 1 capsule (500 mg total) by mouth every 12 (twelve) hours for 7 days, Disp: 14 capsule, Rfl: 0    Ascorbic Acid (vitamin C) 1000 MG tablet, Take 1 tablet (1,000 mg total) by mouth every 12 (twelve) hours (Patient not taking: Reported on 3/19/2022 ), Disp: 20 tablet, Rfl: 0    benzonatate (TESSALON PERLES) 100 mg capsule, Take 1 capsule (100 mg total) by mouth 3 (three) times a day as needed for cough (Patient not taking: Reported on 3/19/2022 ), Disp: 20 capsule, Rfl: 0    cholecalciferol (VITAMIN D3) 1,000 units tablet, Take 2 tablets (2,000 Units total) by mouth daily (Patient not taking: Reported on 3/19/2022 ), Disp: 10 tablet, Rfl: 0    dexamethasone (DECADRON) 2 mg tablet, Take 3 tablets (6 mg total) by mouth daily with breakfast for 5 days (Patient not taking: Reported on 3/19/2022 ), Disp: 15 tablet, Rfl: 0    ergocalciferol (VITAMIN D2) 50,000 units, Take 1 capsule (50,000 Units total) by mouth once a week (Patient not taking: Reported on 6/2/2021), Disp: 12 capsule, Rfl: 1    esomeprazole (NexIUM) 40 MG capsule, take 1 capsule by mouth daily BEFORE BREAKFAST, Disp: 90 capsule, Rfl: 1    Multiple Vitamin (multivitamin) tablet, Take 1 tablet by mouth daily (Patient not taking: Reported on 3/19/2022 ), Disp: 30 tablet, Rfl: 0    nicotine (NICODERM CQ) 21 mg/24 hr TD 24 hr patch, Place 1 patch on the skin daily (Patient not taking: Reported on 8/5/2021), Disp: 28 patch, Rfl: 0    omeprazole (PriLOSEC) 20 mg delayed release capsule, Take 20 mg by mouth daily, Disp: , Rfl:     ondansetron (ZOFRAN-ODT) 4 mg disintegrating tablet, Take 1 tablet (4 mg total) by mouth every 8 (eight) hours as needed for nausea or vomiting (Patient not taking: Reported on 3/19/2022 ), Disp: 12 tablet, Rfl: 0    Current Allergies     Allergies as of 2022 - Reviewed 2022   Allergen Reaction Noted    Reglan [metoclopramide] Myalgia 2020            The following portions of the patient's history were reviewed and updated as appropriate: allergies, current medications, past family history, past medical history, past social history, past surgical history and problem list      Past Medical History:   Diagnosis Date    Acid reflux     COVID-19     GERD (gastroesophageal reflux disease)     Hyperlipidemia        Past Surgical History:   Procedure Laterality Date     SECTION      DILATION AND CURETTAGE OF UTERUS      2x    TONSILECTOMY AND ADNOIDECTOMY      TONSILLECTOMY      TUBAL LIGATION         Family History   Problem Relation Age of Onset    Heart disease Mother     No Known Problems Father          Medications have been verified  Objective   /70   Pulse 74   Temp 98 °F (36 7 °C)   Resp 20   Ht 5' 3" (1 6 m)   Wt 86 6 kg (191 lb)   SpO2 98%   BMI 33 83 kg/m²   No LMP recorded  Physical Exam     Physical Exam  Vitals and nursing note reviewed  Constitutional:       General: She is not in acute distress  Appearance: Normal appearance  She is normal weight  She is not ill-appearing, toxic-appearing or diaphoretic  HENT:      Head: Normocephalic and atraumatic  Right Ear: Tympanic membrane and ear canal normal       Left Ear: Tympanic membrane and ear canal normal       Nose: Congestion and rhinorrhea present  Mouth/Throat:      Mouth: Mucous membranes are moist       Pharynx: Oropharynx is clear  No oropharyngeal exudate or posterior oropharyngeal erythema  Eyes:      Extraocular Movements: Extraocular movements intact     Cardiovascular: Rate and Rhythm: Normal rate and regular rhythm  Pulses: Normal pulses  Heart sounds: Normal heart sounds  Pulmonary:      Effort: Pulmonary effort is normal  No respiratory distress  Breath sounds: Normal breath sounds  No stridor  No wheezing, rhonchi or rales  Chest:      Chest wall: No tenderness  Abdominal:      General: There is no distension  Palpations: Abdomen is soft  Tenderness: There is abdominal tenderness  There is guarding  Comments: Right and left lower quadrant TTP  Musculoskeletal:         General: Normal range of motion  Cervical back: Normal range of motion and neck supple  Skin:     General: Skin is warm and dry  Capillary Refill: Capillary refill takes less than 2 seconds  Neurological:      General: No focal deficit present  Mental Status: She is alert and oriented to person, place, and time  Psychiatric:         Mood and Affect: Mood normal          Behavior: Behavior normal          Thought Content: Thought content normal          Judgment: Judgment normal            UA POCT mod leuks large blood (has menses)  Protein 300   Will send for UA culture     preg negative

## 2022-05-19 NOTE — PATIENT INSTRUCTIONS
You have been prescribed cephalexin for UTI and upper respiratory symptoms  You are to take ALL as prescribed  You are to increase water  You have a covid testing and urine culture pending - you are to download [employer] for the results in 24-72 hours, you will be notified if they are abnormal   You are to get an OTC product for symptom relief as well     You are to follow up with your PCP  Go to the ED if symptoms

## 2022-05-20 ENCOUNTER — TELEPHONE (OUTPATIENT)
Dept: FAMILY MEDICINE CLINIC | Facility: CLINIC | Age: 37
End: 2022-05-20

## 2022-05-20 LAB
BACTERIA UR CULT: NORMAL
SARS-COV-2 RNA RESP QL NAA+PROBE: NEGATIVE

## 2022-05-20 NOTE — TELEPHONE ENCOUNTER
[facility] pharmacy left a VM  Nexium 40mg needs a PA through [facility] for a refill  [facility] [phone]  No contact name was given

## 2022-06-21 ENCOUNTER — OFFICE VISIT (OUTPATIENT)
Dept: URGENT CARE | Facility: CLINIC | Age: 37
End: 2022-06-21
Payer: COMMERCIAL

## 2022-06-21 VITALS
SYSTOLIC BLOOD PRESSURE: 128 MMHG | RESPIRATION RATE: 16 BRPM | OXYGEN SATURATION: 97 % | HEART RATE: 101 BPM | TEMPERATURE: 98.6 F | DIASTOLIC BLOOD PRESSURE: 84 MMHG

## 2022-06-21 DIAGNOSIS — J02.9 ACUTE PHARYNGITIS, UNSPECIFIED ETIOLOGY: Primary | ICD-10-CM

## 2022-06-21 LAB — S PYO AG THROAT QL: NEGATIVE

## 2022-06-21 PROCEDURE — 99213 OFFICE O/P EST LOW 20 MIN: CPT

## 2022-06-21 PROCEDURE — 87070 CULTURE OTHR SPECIMN AEROBIC: CPT

## 2022-06-21 PROCEDURE — 87880 STREP A ASSAY W/OPTIC: CPT

## 2022-06-21 NOTE — PROGRESS NOTES
[facility]        NAME: Carmela Perez is a 40 y o  female  : 1985    MRN: [id number]  DATE: 2022  TIME: 5:20 PM    Assessment and Plan   Acute pharyngitis, unspecified etiology [J02 9]  1  Acute pharyngitis, unspecified etiology  POCT rapid strepA    Throat culture       Tested for strep in office today, results were negative  Will send for culture and follow up on results  Continue supportive care, and educated pt on  Can try Cepacol throat spray from the pharmacy for symptoms  Patient Instructions     Will send for culture, we will notify you if any additional medications will be needed  Continue supportive care with salt water gargles, cough drops, over the counter throat sprays, and warm fluids  Follow up with PCP in 3-5 days  Proceed to  ER if symptoms worsen  Chief Complaint     Chief Complaint   Patient presents with    Sore Throat     2 days  No coughing, no congestion, no fever  Left earache  Denies exposure to anyone with similar sx  No covid vaccines  Taking ibuprofen with no relief  History of Present Illness       Presents with 2 days of symptoms including sore throat  No other coughing, congestion, or fever  Mild left earache and feels a lump in the throat  Denies exposure to anyone with similar sx  No covid vaccines  Taking ibuprofen with no relief  Review of Systems   Review of Systems   Constitutional: Negative for chills, fatigue and fever  HENT: Positive for ear pain and sore throat  Negative for congestion  Respiratory: Negative for cough, shortness of breath and wheezing  Cardiovascular: Negative for chest pain  Gastrointestinal: Negative for abdominal pain  Genitourinary: Negative for dysuria  Musculoskeletal: Negative for myalgias  Neurological: Negative for dizziness  Psychiatric/Behavioral: Negative for confusion           Current Medications       Current Outpatient Medications:     esomeprazole (NexIUM) 40 MG capsule, take 1 capsule by mouth daily BEFORE BREAKFAST, Disp: 90 capsule, Rfl: 1    Ascorbic Acid (vitamin C) 1000 MG tablet, Take 1 tablet (1,000 mg total) by mouth every 12 (twelve) hours (Patient not taking: Reported on 3/19/2022 ), Disp: 20 tablet, Rfl: 0    benzonatate (TESSALON PERLES) 100 mg capsule, Take 1 capsule (100 mg total) by mouth 3 (three) times a day as needed for cough (Patient not taking: Reported on 3/19/2022 ), Disp: 20 capsule, Rfl: 0    cholecalciferol (VITAMIN D3) 1,000 units tablet, Take 2 tablets (2,000 Units total) by mouth daily (Patient not taking: Reported on 3/19/2022 ), Disp: 10 tablet, Rfl: 0    dexamethasone (DECADRON) 2 mg tablet, Take 3 tablets (6 mg total) by mouth daily with breakfast for 5 days (Patient not taking: Reported on 3/19/2022 ), Disp: 15 tablet, Rfl: 0    ergocalciferol (VITAMIN D2) 50,000 units, Take 1 capsule (50,000 Units total) by mouth once a week (Patient not taking: Reported on 6/2/2021), Disp: 12 capsule, Rfl: 1    Multiple Vitamin (multivitamin) tablet, Take 1 tablet by mouth daily (Patient not taking: Reported on 3/19/2022 ), Disp: 30 tablet, Rfl: 0    nicotine (NICODERM CQ) 21 mg/24 hr TD 24 hr patch, Place 1 patch on the skin daily (Patient not taking: Reported on 8/5/2021), Disp: 28 patch, Rfl: 0    omeprazole (PriLOSEC) 20 mg delayed release capsule, Take 20 mg by mouth daily, Disp: , Rfl:     ondansetron (ZOFRAN-ODT) 4 mg disintegrating tablet, Take 1 tablet (4 mg total) by mouth every 8 (eight) hours as needed for nausea or vomiting (Patient not taking: Reported on 3/19/2022 ), Disp: 12 tablet, Rfl: 0    Current Allergies     Allergies as of 06/21/2022 - Reviewed 06/21/2022   Allergen Reaction Noted    Reglan [metoclopramide] Myalgia 11/11/2020            The following portions of the patient's history were reviewed and updated as appropriate: allergies, current medications, past family history, past medical history, past social history, past surgical history and problem list      Past Medical History:   Diagnosis Date    Acid reflux     COVID-19     GERD (gastroesophageal reflux disease)     Hyperlipidemia        Past Surgical History:   Procedure Laterality Date     SECTION      DILATION AND CURETTAGE OF UTERUS      2x    TONSILECTOMY AND ADNOIDECTOMY      TONSILLECTOMY      TUBAL LIGATION         Family History   Problem Relation Age of Onset    Heart disease Mother     No Known Problems Father          Medications have been verified  Objective   /84   Pulse 101   Temp 98 6 °F (37 °C)   Resp 16   SpO2 97%        Physical Exam     Physical Exam  Vitals reviewed  Constitutional:       General: She is not in acute distress  Appearance: Normal appearance  HENT:      Right Ear: Tympanic membrane, ear canal and external ear normal       Left Ear: Tympanic membrane, ear canal and external ear normal       Nose: Nose normal       Mouth/Throat:      Mouth: Mucous membranes are moist       Pharynx: Posterior oropharyngeal erythema present  Tonsils: No tonsillar exudate or tonsillar abscesses  0 on the right  0 on the left  Eyes:      Conjunctiva/sclera: Conjunctivae normal    Cardiovascular:      Rate and Rhythm: Normal rate and regular rhythm  Pulses: Normal pulses  Heart sounds: Normal heart sounds  No murmur heard  Pulmonary:      Effort: Pulmonary effort is normal  No respiratory distress  Breath sounds: Normal breath sounds  Musculoskeletal:         General: Normal range of motion  Skin:     General: Skin is warm and dry  Neurological:      General: No focal deficit present  Mental Status: She is alert and oriented to person, place, and time     Psychiatric:         Mood and Affect: Mood normal          Behavior: Behavior normal

## 2022-06-23 LAB — BACTERIA THROAT CULT: NORMAL

## 2022-10-03 ENCOUNTER — TELEPHONE (OUTPATIENT)
Dept: FAMILY MEDICINE CLINIC | Facility: CLINIC | Age: 37
End: 2022-10-03

## 2022-10-03 NOTE — TELEPHONE ENCOUNTER
Spoke with pt she still  thinks it is brachial neurits - did research and that is one thing you can get from a tetanus shot - offered appt but states since you cant physically see anything on her arm doesn't want appt asking if there is a test that can be done to dx this - emg?

## 2022-10-03 NOTE — TELEPHONE ENCOUNTER
Pt called in  She got a tetanus shot about a month ago  After the inj, she had the initial pain for 1-2 days then it went away  Starting 2 weeks ago she is having weakness and pain around the injection site  Her arm is difficult to lift due to this   Pt is asking if this is normal     [phone]

## 2022-10-03 NOTE — TELEPHONE ENCOUNTER
No, as it is not in the same part of the arm Xeljanz Counseling: I discussed with the patient the risks of Xeljanz therapy including increased risk of infection, liver issues, headache, diarrhea, or cold symptoms. Live vaccines should be avoided. They were instructed to call if they have any problems. Oral Minoxidil Counseling- I discussed with the patient the risks of oral minoxidil including but not limited to shortness of breath, swelling of the feet or ankles, dizziness, lightheadedness, unwanted hair growth and allergic reaction.  The patient verbalized understanding of the proper use and possible adverse effects of oral minoxidil.  All of the patient's questions and concerns were addressed. Clofazimine Pregnancy And Lactation Text: This medication is Pregnancy Category C and isn't considered safe during pregnancy. It is excreted in breast milk. Minocycline Pregnancy And Lactation Text: This medication is Pregnancy Category D and not consider safe during pregnancy. It is also excreted in breast milk. Minocycline Counseling: Patient advised regarding possible photosensitivity and discoloration of the teeth, skin, lips, tongue and gums.  Patient instructed to avoid sunlight, if possible.  When exposed to sunlight, patients should wear protective clothing, sunglasses, and sunscreen.  The patient was instructed to call the office immediately if the following severe adverse effects occur:  hearing changes, easy bruising/bleeding, severe headache, or vision changes.  The patient verbalized understanding of the proper use and possible adverse effects of minocycline.  All of the patient's questions and concerns were addressed. Glycopyrrolate Pregnancy And Lactation Text: This medication is Pregnancy Category B and is considered safe during pregnancy. It is unknown if it is excreted breast milk. Solaraze Pregnancy And Lactation Text: This medication is Pregnancy Category B and is considered safe. There is some data to suggest avoiding during the third trimester. It is unknown if this medication is excreted in breast milk. Drysol Counseling:  I discussed with the patient the risks of drysol/aluminum chloride including but not limited to skin rash, itching, irritation, burning. Clindamycin Pregnancy And Lactation Text: This medication can be used in pregnancy if certain situations. Clindamycin is also present in breast milk. Albendazole Pregnancy And Lactation Text: This medication is Pregnancy Category C and it isn't known if it is safe during pregnancy. It is also excreted in breast milk. Topical Retinoid Pregnancy And Lactation Text: This medication is Pregnancy Category C. It is unknown if this medication is excreted in breast milk. Methotrexate Counseling:  Patient counseled regarding adverse effects of methotrexate including but not limited to nausea, vomiting, abnormalities in liver function tests. Patients may develop mouth sores, rash, diarrhea, and abnormalities in blood counts. The patient understands that monitoring is required including LFT's and blood counts.  There is a rare possibility of scarring of the liver and lung problems that can occur when taking methotrexate. Persistent nausea, loss of appetite, pale stools, dark urine, cough, and shortness of breath should be reported immediately. Patient advised to discontinue methotrexate treatment at least three months before attempting to become pregnant.  I discussed the need for folate supplements while taking methotrexate.  These supplements can decrease side effects during methotrexate treatment. The patient verbalized understanding of the proper use and possible adverse effects of methotrexate.  All of the patient's questions and concerns were addressed. Winlevi Pregnancy And Lactation Text: This medication is considered safe during pregnancy and breastfeeding. Valtrex Counseling: I discussed with the patient the risks of valacyclovir including but not limited to kidney damage, nausea, vomiting and severe allergy.  The patient understands that if the infection seems to be worsening or is not improving, they are to call. Glycopyrrolate Counseling:  I discussed with the patient the risks of glycopyrrolate including but not limited to skin rash, drowsiness, dry mouth, difficulty urinating, and blurred vision. Arava Pregnancy And Lactation Text: This medication is Pregnancy Category X and is absolutely contraindicated during pregnancy. It is unknown if it is excreted in breast milk. Protopic Pregnancy And Lactation Text: This medication is Pregnancy Category C. It is unknown if this medication is excreted in breast milk when applied topically. Dapsone Counseling: I discussed with the patient the risks of dapsone including but not limited to hemolytic anemia, agranulocytosis, rashes, methemoglobinemia, kidney failure, peripheral neuropathy, headaches, GI upset, and liver toxicity.  Patients who start dapsone require monitoring including baseline LFTs and weekly CBCs for the first month, then every month thereafter.  The patient verbalized understanding of the proper use and possible adverse effects of dapsone.  All of the patient's questions and concerns were addressed. Include Pregnancy/Lactation Warning?: No Xolair Counseling:  Patient informed of potential adverse effects including but not limited to fever, muscle aches, rash and allergic reactions.  The patient verbalized understanding of the proper use and possible adverse effects of Xolair.  All of the patient's questions and concerns were addressed. Eucrisa Counseling: Patient may experience a mild burning sensation during topical application. Eucrisa is not approved in children less than 2 years of age. Quinolones Pregnancy And Lactation Text: This medication is Pregnancy Category C and it isn't know if it is safe during pregnancy. It is also excreted in breast milk. Tazorac Pregnancy And Lactation Text: This medication is not safe during pregnancy. It is unknown if this medication is excreted in breast milk. Azathioprine Counseling:  I discussed with the patient the risks of azathioprine including but not limited to myelosuppression, immunosuppression, hepatotoxicity, lymphoma, and infections.  The patient understands that monitoring is required including baseline LFTs, Creatinine, possible TPMP genotyping and weekly CBCs for the first month and then every 2 weeks thereafter.  The patient verbalized understanding of the proper use and possible adverse effects of azathioprine.  All of the patient's questions and concerns were addressed. Cellcept Pregnancy And Lactation Text: This medication is Pregnancy Category D and isn't considered safe during pregnancy. It is unknown if this medication is excreted in breast milk. Doxycycline Pregnancy And Lactation Text: This medication is Pregnancy Category D and not consider safe during pregnancy. It is also excreted in breast milk but is considered safe for shorter treatment courses. Tremfya Counseling: I discussed with the patient the risks of guselkumab including but not limited to immunosuppression, serious infections, worsening of inflammatory bowel disease and drug reactions.  The patient understands that monitoring is required including a PPD at baseline and must alert us or the primary physician if symptoms of infection or other concerning signs are noted. Enbrel Pregnancy And Lactation Text: This medication is Pregnancy Category B and is considered safe during pregnancy. It is unknown if this medication is excreted in breast milk. Siliq Counseling:  I discussed with the patient the risks of Siliq including but not limited to new or worsening depression, suicidal thoughts and behavior, immunosuppression, malignancy, posterior leukoencephalopathy syndrome, and serious infections.  The patient understands that monitoring is required including a PPD at baseline and must alert us or the primary physician if symptoms of infection or other concerning signs are noted. There is also a special program designed to monitor depression which is required with Siliq. Wartpeel Pregnancy And Lactation Text: This medication is Pregnancy Category X and contraindicated in pregnancy and in women who may become pregnant. It is unknown if this medication is excreted in breast milk. Metronidazole Counseling:  I discussed with the patient the risks of metronidazole including but not limited to seizures, nausea/vomiting, a metallic taste in the mouth, nausea/vomiting and severe allergy. Cimetidine Counseling:  I discussed with the patient the risks of Cimetidine including but not limited to gynecomastia, headache, diarrhea, nausea, drowsiness, arrhythmias, pancreatitis, skin rashes, psychosis, bone marrow suppression and kidney toxicity. Bexarotene Counseling:  I discussed with the patient the risks of bexarotene including but not limited to hair loss, dry lips/skin/eyes, liver abnormalities, hyperlipidemia, pancreatitis, depression/suicidal ideation, photosensitivity, drug rash/allergic reactions, hypothyroidism, anemia, leukopenia, infection, cataracts, and teratogenicity.  Patient understands that they will need regular blood tests to check lipid profile, liver function tests, white blood cell count, thyroid function tests and pregnancy test if applicable. Cosentyx Counseling:  I discussed with the patient the risks of Cosentyx including but not limited to worsening of Crohn's disease, immunosuppression, allergic reactions and infections.  The patient understands that monitoring is required including a PPD at baseline and must alert us or the primary physician if symptoms of infection or other concerning signs are noted. Taltz Counseling: I discussed with the patient the risks of ixekizumab including but not limited to immunosuppression, serious infections, worsening of inflammatory bowel disease and drug reactions.  The patient understands that monitoring is required including a PPD at baseline and must alert us or the primary physician if symptoms of infection or other concerning signs are noted. Imiquimod Counseling:  I discussed with the patient the risks of imiquimod including but not limited to erythema, scaling, itching, weeping, crusting, and pain.  Patient understands that the inflammatory response to imiquimod is variable from person to person and was educated regarded proper titration schedule.  If flu-like symptoms develop, patient knows to discontinue the medication and contact us. Sarecycline Counseling: Patient advised regarding possible photosensitivity and discoloration of the teeth, skin, lips, tongue and gums.  Patient instructed to avoid sunlight, if possible.  When exposed to sunlight, patients should wear protective clothing, sunglasses, and sunscreen.  The patient was instructed to call the office immediately if the following severe adverse effects occur:  hearing changes, easy bruising/bleeding, severe headache, or vision changes.  The patient verbalized understanding of the proper use and possible adverse effects of sarecycline.  All of the patient's questions and concerns were addressed. Doxycycline Counseling:  Patient counseled regarding possible photosensitivity and increased risk for sunburn.  Patient instructed to avoid sunlight, if possible.  When exposed to sunlight, patients should wear protective clothing, sunglasses, and sunscreen.  The patient was instructed to call the office immediately if the following severe adverse effects occur:  hearing changes, easy bruising/bleeding, severe headache, or vision changes.  The patient verbalized understanding of the proper use and possible adverse effects of doxycycline.  All of the patient's questions and concerns were addressed. Simponi Pregnancy And Lactation Text: The risk during pregnancy and breastfeeding is uncertain with this medication. Propranolol Pregnancy And Lactation Text: This medication is Pregnancy Category C and it isn't known if it is safe during pregnancy. It is excreted in breast milk. Topical Clindamycin Counseling: Patient counseled that this medication may cause skin irritation or allergic reactions.  In the event of skin irritation, the patient was advised to reduce the amount of the drug applied or use it less frequently.   The patient verbalized understanding of the proper use and possible adverse effects of clindamycin.  All of the patient's questions and concerns were addressed. Cimzia Pregnancy And Lactation Text: This medication crosses the placenta but can be considered safe in certain situations. Cimzia may be excreted in breast milk. Birth Control Pills Pregnancy And Lactation Text: This medication should be avoided if pregnant and for the first 30 days post-partum. Acitretin Counseling:  I discussed with the patient the risks of acitretin including but not limited to hair loss, dry lips/skin/eyes, liver damage, hyperlipidemia, depression/suicidal ideation, photosensitivity.  Serious rare side effects can include but are not limited to pancreatitis, pseudotumor cerebri, bony changes, clot formation/stroke/heart attack.  Patient understands that alcohol is contraindicated since it can result in liver toxicity and significantly prolong the elimination of the drug by many years. Carac Counseling:  I discussed with the patient the risks of Carac including but not limited to erythema, scaling, itching, weeping, crusting, and pain. Cyclophosphamide Pregnancy And Lactation Text: This medication is Pregnancy Category D and it isn't considered safe during pregnancy. This medication is excreted in breast milk. Winlevi Counseling:  I discussed with the patient the risks of topical clascoterone including but not limited to erythema, scaling, itching, and stinging. Patient voiced their understanding. Elidel Counseling: Patient may experience a mild burning sensation during topical application. Elidel is not approved in children less than 2 years of age. There have been case reports of hematologic and skin malignancies in patients using topical calcineurin inhibitors although causality is questionable. Cephalexin Counseling: I counseled the patient regarding use of cephalexin as an antibiotic for prophylactic and/or therapeutic purposes. Cephalexin (commonly prescribed under brand name Keflex) is a cephalosporin antibiotic which is active against numerous classes of bacteria, including most skin bacteria. Side effects may include nausea, diarrhea, gastrointestinal upset, rash, hives, yeast infections, and in rare cases, hepatitis, kidney disease, seizures, fever, confusion, neurologic symptoms, and others. Patients with severe allergies to penicillin medications are cautioned that there is about a 10% incidence of cross-reactivity with cephalosporins. When possible, patients with penicillin allergies should use alternatives to cephalosporins for antibiotic therapy. SSKI Counseling:  I discussed with the patient the risks of SSKI including but not limited to thyroid abnormalities, metallic taste, GI upset, fever, headache, acne, arthralgias, paraesthesias, lymphadenopathy, easy bleeding, arrhythmias, and allergic reaction. Cephalexin Pregnancy And Lactation Text: This medication is Pregnancy Category B and considered safe during pregnancy.  It is also excreted in breast milk but can be used safely for shorter doses. Xelstarz Pregnancy And Lactation Text: This medication is Pregnancy Category D and is not considered safe during pregnancy.  The risk during breast feeding is also uncertain. Valtrex Pregnancy And Lactation Text: this medication is Pregnancy Category B and is considered safe during pregnancy. This medication is not directly found in breast milk but it's metabolite acyclovir is present. Propranolol Counseling:  I discussed with the patient the risks of propranolol including but not limited to low heart rate, low blood pressure, low blood sugar, restlessness and increased cold sensitivity. They should call the office if they experience any of these side effects. Thalidomide Counseling: I discussed with the patient the risks of thalidomide including but not limited to birth defects, anxiety, weakness, chest pain, dizziness, cough and severe allergy. Cimzia Counseling:  I discussed with the patient the risks of Cimzia including but not limited to immunosuppression, allergic reactions and infections.  The patient understands that monitoring is required including a PPD at baseline and must alert us or the primary physician if symptoms of infection or other concerning signs are noted. Calcipotriene Counseling:  I discussed with the patient the risks of calcipotriene including but not limited to erythema, scaling, itching, and irritation. Birth Control Pills Counseling: Birth Control Pill Counseling: I discussed with the patient the potential side effects of OCPs including but not limited to increased risk of stroke, heart attack, thrombophlebitis, deep venous thrombosis, hepatic adenomas, breast changes, GI upset, headaches, and depression.  The patient verbalized understanding of the proper use and possible adverse effects of OCPs. All of the patient's questions and concerns were addressed. Tetracycline Counseling: Patient counseled regarding possible photosensitivity and increased risk for sunburn.  Patient instructed to avoid sunlight, if possible.  When exposed to sunlight, patients should wear protective clothing, sunglasses, and sunscreen.  The patient was instructed to call the office immediately if the following severe adverse effects occur:  hearing changes, easy bruising/bleeding, severe headache, or vision changes.  The patient verbalized understanding of the proper use and possible adverse effects of tetracycline.  All of the patient's questions and concerns were addressed. Patient understands to avoid pregnancy while on therapy due to potential birth defects. Hydroxychloroquine Counseling:  I discussed with the patient that a baseline ophthalmologic exam is needed at the start of therapy and every year thereafter while on therapy. A CBC may also be warranted for monitoring.  The side effects of this medication were discussed with the patient, including but not limited to agranulocytosis, aplastic anemia, seizures, rashes, retinopathy, and liver toxicity. Patient instructed to call the office should any adverse effect occur.  The patient verbalized understanding of the proper use and possible adverse effects of Plaquenil.  All the patient's questions and concerns were addressed. Otezla Pregnancy And Lactation Text: This medication is Pregnancy Category C and it isn't known if it is safe during pregnancy. It is unknown if it is excreted in breast milk. Minoxidil Pregnancy And Lactation Text: This medication has not been assigned a Pregnancy Risk Category but animal studies failed to show danger with the topical medication. It is unknown if the medication is excreted in breast milk. Ketoconazole Counseling:   Patient counseled regarding improving absorption with orange juice.  Adverse effects include but are not limited to breast enlargement, headache, diarrhea, nausea, upset stomach, liver function test abnormalities, taste disturbance, and stomach pain.  There is a rare possibility of liver failure that can occur when taking ketoconazole. The patient understands that monitoring of LFTs may be required, especially at baseline. The patient verbalized understanding of the proper use and possible adverse effects of ketoconazole.  All of the patient's questions and concerns were addressed. Cimetidine Pregnancy And Lactation Text: This medication is Pregnancy Category B and is considered safe during pregnancy. It is also excreted in breast milk and breast feeding isn't recommended. Spironolactone Counseling: Patient advised regarding risks of diarrhea, abdominal pain, hyperkalemia, birth defects (for female patients), liver toxicity and renal toxicity. The patient may need blood work to monitor liver and kidney function and potassium levels while on therapy. The patient verbalized understanding of the proper use and possible adverse effects of spironolactone.  All of the patient's questions and concerns were addressed. Topical Clindamycin Pregnancy And Lactation Text: This medication is Pregnancy Category B and is considered safe during pregnancy. It is unknown if it is excreted in breast milk. Rhofade Counseling: Rhofade is a topical medication which can decrease superficial blood flow where applied. Side effects are uncommon and include stinging, redness and allergic reactions. Cellcept Counseling:  I discussed with the patient the risks of mycophenolate mofetil including but not limited to infection/immunosuppression, GI upset, hypokalemia, hypercholesterolemia, bone marrow suppression, lymphoproliferative disorders, malignancy, GI ulceration/bleed/perforation, colitis, interstitial lung disease, kidney failure, progressive multifocal leukoencephalopathy, and birth defects.  The patient understands that monitoring is required including a baseline creatinine and regular CBC testing. In addition, patient must alert us immediately if symptoms of infection or other concerning signs are noted. Minoxidil Counseling: Minoxidil is a topical medication which can increase blood flow where it is applied. It is uncertain how this medication increases hair growth. Side effects are uncommon and include stinging and allergic reactions. Dupixent Pregnancy And Lactation Text: This medication likely crosses the placenta but the risk for the fetus is uncertain. This medication is excreted in breast milk. Griseofulvin Counseling:  I discussed with the patient the risks of griseofulvin including but not limited to photosensitivity, cytopenia, liver damage, nausea/vomiting and severe allergy.  The patient understands that this medication is best absorbed when taken with a fatty meal (e.g., ice cream or french fries). Albendazole Counseling:  I discussed with the patient the risks of albendazole including but not limited to cytopenia, kidney damage, nausea/vomiting and severe allergy.  The patient understands that this medication is being used in an off-label manner. 5-Fu Counseling: 5-Fluorouracil Counseling:  I discussed with the patient the risks of 5-fluorouracil including but not limited to erythema, scaling, itching, weeping, crusting, and pain. Ketoconazole Pregnancy And Lactation Text: This medication is Pregnancy Category C and it isn't know if it is safe during pregnancy. It is also excreted in breast milk and breast feeding isn't recommended. Azithromycin Counseling:  I discussed with the patient the risks of azithromycin including but not limited to GI upset, allergic reaction, drug rash, diarrhea, and yeast infections. Benzoyl Peroxide Counseling: Patient counseled that medicine may cause skin irritation and bleach clothing.  In the event of skin irritation, the patient was advised to reduce the amount of the drug applied or use it less frequently.   The patient verbalized understanding of the proper use and possible adverse effects of benzoyl peroxide.  All of the patient's questions and concerns were addressed. Prednisone Pregnancy And Lactation Text: This medication is Pregnancy Category C and it isn't know if it is safe during pregnancy. This medication is excreted in breast milk. Griseofulvin Pregnancy And Lactation Text: This medication is Pregnancy Category X and is known to cause serious birth defects. It is unknown if this medication is excreted in breast milk but breast feeding should be avoided. Hydroquinone Counseling:  Patient advised that medication may result in skin irritation, lightening (hypopigmentation), dryness, and burning.  In the event of skin irritation, the patient was advised to reduce the amount of the drug applied or use it less frequently.  Rarely, spots that are treated with hydroquinone can become darker (pseudoochronosis).  Should this occur, patient instructed to stop medication and call the office. The patient verbalized understanding of the proper use and possible adverse effects of hydroquinone.  All of the patient's questions and concerns were addressed. Stelara Counseling:  I discussed with the patient the risks of ustekinumab including but not limited to immunosuppression, malignancy, posterior leukoencephalopathy syndrome, and serious infections.  The patient understands that monitoring is required including a PPD at baseline and must alert us or the primary physician if symptoms of infection or other concerning signs are noted. Finasteride Pregnancy And Lactation Text: This medication is absolutely contraindicated during pregnancy. It is unknown if it is excreted in breast milk. Calcipotriene Pregnancy And Lactation Text: This medication has not been proven safe during pregnancy. It is unknown if this medication is excreted in breast milk. Oxybutynin Counseling:  I discussed with the patient the risks of oxybutynin including but not limited to skin rash, drowsiness, dry mouth, difficulty urinating, and blurred vision. Dutasteride Pregnancy And Lactation Text: This medication is absolutely contraindicated in women, especially during pregnancy and breast feeding. Feminization of male fetuses is possible if taking while pregnant. Bexarotene Pregnancy And Lactation Text: This medication is Pregnancy Category X and should not be given to women who are pregnant or may become pregnant. This medication should not be used if you are breast feeding. Rhofade Pregnancy And Lactation Text: This medication has not been assigned a Pregnancy Risk Category. It is unknown if the medication is excreted in breast milk. Prednisone Counseling:  I discussed with the patient the risks of prolonged use of prednisone including but not limited to weight gain, insomnia, osteoporosis, mood changes, diabetes, susceptibility to infection, glaucoma and high blood pressure.  In cases where prednisone use is prolonged, patients should be monitored with blood pressure checks, serum glucose levels and an eye exam.  Additionally, the patient may need to be placed on GI prophylaxis, PCP prophylaxis, and calcium and vitamin D supplementation and/or a bisphosphonate.  The patient verbalized understanding of the proper use and the possible adverse effects of prednisone.  All of the patient's questions and concerns were addressed. Cyclosporine Counseling:  I discussed with the patient the risks of cyclosporine including but not limited to hypertension, gingival hyperplasia,myelosuppression, immunosuppression, liver damage, kidney damage, neurotoxicity, lymphoma, and serious infections. The patient understands that monitoring is required including baseline blood pressure, CBC, CMP, lipid panel and uric acid, and then 1-2 times monthly CMP and blood pressure. Libtayo Counseling- I discussed with the patient the risks of Libtayo including but not limited to nausea, vomiting, diarrhea, and bone or muscle pain.  The patient verbalized understanding of the proper use and possible adverse effects of Libtayo.  All of the patient's questions and concerns were addressed. Colchicine Counseling:  Patient counseled regarding adverse effects including but not limited to stomach upset (nausea, vomiting, stomach pain, or diarrhea).  Patient instructed to limit alcohol consumption while taking this medication.  Colchicine may reduce blood counts especially with prolonged use.  The patient understands that monitoring of kidney function and blood counts may be required, especially at baseline. The patient verbalized understanding of the proper use and possible adverse effects of colchicine.  All of the patient's questions and concerns were addressed. Erivedge Counseling- I discussed with the patient the risks of Erivedge including but not limited to nausea, vomiting, diarrhea, constipation, weight loss, changes in the sense of taste, decreased appetite, muscle spasms, and hair loss.  The patient verbalized understanding of the proper use and possible adverse effects of Erivedge.  All of the patient's questions and concerns were addressed. Infliximab Counseling:  I discussed with the patient the risks of infliximab including but not limited to myelosuppression, immunosuppression, autoimmune hepatitis, demyelinating diseases, lymphoma, and serious infections.  The patient understands that monitoring is required including a PPD at baseline and must alert us or the primary physician if symptoms of infection or other concerning signs are noted. Rifampin Pregnancy And Lactation Text: This medication is Pregnancy Category C and it isn't know if it is safe during pregnancy. It is also excreted in breast milk and should not be used if you are breast feeding. Skyrizi Counseling: I discussed with the patient the risks of risankizumab-rzaa including but not limited to immunosuppression, and serious infections.  The patient understands that monitoring is required including a PPD at baseline and must alert us or the primary physician if symptoms of infection or other concerning signs are noted. Quinolones Counseling:  I discussed with the patient the risks of fluoroquinolones including but not limited to GI upset, allergic reaction, drug rash, diarrhea, dizziness, photosensitivity, yeast infections, liver function test abnormalities, tendonitis/tendon rupture. Erythromycin Pregnancy And Lactation Text: This medication is Pregnancy Category B and is considered safe during pregnancy. It is also excreted in breast milk. Dapsone Pregnancy And Lactation Text: This medication is Pregnancy Category C and is not considered safe during pregnancy or breast feeding. Niacinamide Pregnancy And Lactation Text: These medications are considered safe during pregnancy. Otezla Counseling: The side effects of Otezla were discussed with the patient, including but not limited to worsening or new depression, weight loss, diarrhea, nausea, upper respiratory tract infection, and headache. Patient instructed to call the office should any adverse effect occur.  The patient verbalized understanding of the proper use and possible adverse effects of Otezla.  All the patient's questions and concerns were addressed. Solaraze Counseling:  I discussed with the patient the risks of Solaraze including but not limited to erythema, scaling, itching, weeping, crusting, and pain. Humira Counseling:  I discussed with the patient the risks of adalimumab including but not limited to myelosuppression, immunosuppression, autoimmune hepatitis, demyelinating diseases, lymphoma, and serious infections.  The patient understands that monitoring is required including a PPD at baseline and must alert us or the primary physician if symptoms of infection or other concerning signs are noted. Ivermectin Counseling:  Patient instructed to take medication on an empty stomach with a full glass of water.  Patient informed of potential adverse effects including but not limited to nausea, diarrhea, dizziness, itching, and swelling of the extremities or lymph nodes.  The patient verbalized understanding of the proper use and possible adverse effects of ivermectin.  All of the patient's questions and concerns were addressed. Itraconazole Counseling:  I discussed with the patient the risks of itraconazole including but not limited to liver damage, nausea/vomiting, neuropathy, and severe allergy.  The patient understands that this medication is best absorbed when taken with acidic beverages such as non-diet cola or ginger ale.  The patient understands that monitoring is required including baseline LFTs and repeat LFTs at intervals.  The patient understands that they are to contact us or the primary physician if concerning signs are noted. Mirvaso Counseling: Mirvaso is a topical medication which can decrease superficial blood flow where applied. Side effects are uncommon and include stinging, redness and allergic reactions. Metronidazole Pregnancy And Lactation Text: This medication is Pregnancy Category B and considered safe during pregnancy.  It is also excreted in breast milk. Hydroxyzine Counseling: Patient advised that the medication is sedating and not to drive a car after taking this medication.  Patient informed of potential adverse effects including but not limited to dry mouth, urinary retention, and blurry vision.  The patient verbalized understanding of the proper use and possible adverse effects of hydroxyzine.  All of the patient's questions and concerns were addressed. Oral Minoxidil Pregnancy And Lactation Text: This medication should only be used when clearly needed if you are pregnant, attempting to become pregnant or breast feeding. Niacinamide Counseling: I recommended taking niacin or niacinamide, also know as vitamin B3, twice daily. Recent evidence suggests that taking vitamin B3 (500 mg twice daily) can reduce the risk of actinic keratoses and non-melanoma skin cancers. Side effects of vitamin B3 include flushing and headache. Tazorac Counseling:  Patient advised that medication is irritating and drying.  Patient may need to apply sparingly and wash off after an hour before eventually leaving it on overnight.  The patient verbalized understanding of the proper use and possible adverse effects of tazorac.  All of the patient's questions and concerns were addressed. Opioid Pregnancy And Lactation Text: These medications can lead to premature delivery and should be avoided during pregnancy. These medications are also present in breast milk in small amounts. Dutasteride Male Counseling: Dustasteride Counseling:  I discussed with the patient the risks of use of dutasteride including but not limited to decreased libido, decreased ejaculate volume, and gynecomastia. Women who can become pregnant should not handle medication.  All of the patient's questions and concerns were addressed. Gabapentin Counseling: I discussed with the patient the risks of gabapentin including but not limited to dizziness, somnolence, fatigue and ataxia. Cyclophosphamide Counseling:  I discussed with the patient the risks of cyclophosphamide including but not limited to hair loss, hormonal abnormalities, decreased fertility, abdominal pain, diarrhea, nausea and vomiting, bone marrow suppression and infection. The patient understands that monitoring is required while taking this medication. Benzoyl Peroxide Pregnancy And Lactation Text: This medication is Pregnancy Category C. It is unknown if benzoyl peroxide is excreted in breast milk. Xolair Pregnancy And Lactation Text: This medication is Pregnancy Category B and is considered safe during pregnancy. This medication is excreted in breast milk. Terbinafine Counseling: Patient counseling regarding adverse effects of terbinafine including but not limited to headache, diarrhea, rash, upset stomach, liver function test abnormalities, itching, taste/smell disturbance, nausea, abdominal pain, and flatulence.  There is a rare possibility of liver failure that can occur when taking terbinafine.  The patient understands that a baseline LFT and kidney function test may be required. The patient verbalized understanding of the proper use and possible adverse effects of terbinafine.  All of the patient's questions and concerns were addressed. Azithromycin Pregnancy And Lactation Text: This medication is considered safe during pregnancy and is also secreted in breast milk. High Dose Vitamin A Pregnancy And Lactation Text: High dose vitamin A therapy is contraindicated during pregnancy and breast feeding. Wartpeel Counseling:  I discussed with the patient the risks of Wartpeel including but not limited to erythema, scaling, itching, weeping, crusting, and pain. High Dose Vitamin A Counseling: Side effects reviewed, pt to contact office should one occur. Topical Sulfur Applications Counseling: Topical Sulfur Counseling: Patient counseled that this medication may cause skin irritation or allergic reactions.  In the event of skin irritation, the patient was advised to reduce the amount of the drug applied or use it less frequently.   The patient verbalized understanding of the proper use and possible adverse effects of topical sulfur application.  All of the patient's questions and concerns were addressed. Ilumya Counseling: I discussed with the patient the risks of tildrakizumab including but not limited to immunosuppression, malignancy, posterior leukoencephalopathy syndrome, and serious infections.  The patient understands that monitoring is required including a PPD at baseline and must alert us or the primary physician if symptoms of infection or other concerning signs are noted. Sski Pregnancy And Lactation Text: This medication is Pregnancy Category D and isn't considered safe during pregnancy. It is excreted in breast milk. Finasteride Male Counseling: Finasteride Counseling:  I discussed with the patient the risks of use of finasteride including but not limited to decreased libido, decreased ejaculate volume, gynecomastia, and depression. Women should not handle medication.  All of the patient's questions and concerns were addressed. Enbrel Counseling:  I discussed with the patient the risks of etanercept including but not limited to myelosuppression, immunosuppression, autoimmune hepatitis, demyelinating diseases, lymphoma, and infections.  The patient understands that monitoring is required including a PPD at baseline and must alert us or the primary physician if symptoms of infection or other concerning signs are noted. Zyclara Counseling:  I discussed with the patient the risks of imiquimod including but not limited to erythema, scaling, itching, weeping, crusting, and pain.  Patient understands that the inflammatory response to imiquimod is variable from person to person and was educated regarded proper titration schedule.  If flu-like symptoms develop, patient knows to discontinue the medication and contact us. Rituxan Counseling:  I discussed with the patient the risks of Rituxan infusions. Side effects can include infusion reactions, severe drug rashes including mucocutaneous reactions, reactivation of latent hepatitis and other infections and rarely progressive multifocal leukoencephalopathy.  All of the patient's questions and concerns were addressed. Tranexamic Acid Counseling:  Patient advised of the small risk of bleeding problems with tranexamic acid. They were also instructed to call if they developed any nausea, vomiting or diarrhea. All of the patient's questions and concerns were addressed. Simponi Counseling:  I discussed with the patient the risks of golimumab including but not limited to myelosuppression, immunosuppression, autoimmune hepatitis, demyelinating diseases, lymphoma, and serious infections.  The patient understands that monitoring is required including a PPD at baseline and must alert us or the primary physician if symptoms of infection or other concerning signs are noted. Nsaids Counseling: NSAID Counseling: I discussed with the patient that NSAIDs should be taken with food. Prolonged use of NSAIDs can result in the development of stomach ulcers.  Patient advised to stop taking NSAIDs if abdominal pain occurs.  The patient verbalized understanding of the proper use and possible adverse effects of NSAIDs.  All of the patient's questions and concerns were addressed. Methotrexate Pregnancy And Lactation Text: This medication is Pregnancy Category X and is known to cause fetal harm. This medication is excreted in breast milk. Hydroxychloroquine Pregnancy And Lactation Text: This medication has been shown to cause fetal harm but it isn't assigned a Pregnancy Risk Category. There are small amounts excreted in breast milk. Doxepin Counseling:  Patient advised that the medication is sedating and not to drive a car after taking this medication. Patient informed of potential adverse effects including but not limited to dry mouth, urinary retention, and blurry vision.  The patient verbalized understanding of the proper use and possible adverse effects of doxepin.  All of the patient's questions and concerns were addressed. Detail Level: Simple Rituxan Pregnancy And Lactation Text: This medication is Pregnancy Category C and it isn't know if it is safe during pregnancy. It is unknown if this medication is excreted in breast milk but similar antibodies are known to be excreted. Clindamycin Counseling: I counseled the patient regarding use of clindamycin as an antibiotic for prophylactic and/or therapeutic purposes. Clindamycin is active against numerous classes of bacteria, including skin bacteria. Side effects may include nausea, diarrhea, gastrointestinal upset, rash, hives, yeast infections, and in rare cases, colitis. Drysol Pregnancy And Lactation Text: This medication is considered safe during pregnancy and breast feeding. Doxepin Pregnancy And Lactation Text: This medication is Pregnancy Category C and it isn't known if it is safe during pregnancy. It is also excreted in breast milk and breast feeding isn't recommended. Topical Retinoid counseling:  Patient advised to apply a pea-sized amount only at bedtime and wait 30 minutes after washing their face before applying.  If too drying, patient may add a non-comedogenic moisturizer. The patient verbalized understanding of the proper use and possible adverse effects of retinoids.  All of the patient's questions and concerns were addressed. Fluconazole Counseling:  Patient counseled regarding adverse effects of fluconazole including but not limited to headache, diarrhea, nausea, upset stomach, liver function test abnormalities, taste disturbance, and stomach pain.  There is a rare possibility of liver failure that can occur when taking fluconazole.  The patient understands that monitoring of LFTs and kidney function test may be required, especially at baseline. The patient verbalized understanding of the proper use and possible adverse effects of fluconazole.  All of the patient's questions and concerns were addressed. Protopic Counseling: Patient may experience a mild burning sensation during topical application. Protopic is not approved in children less than 2 years of age. There have been case reports of hematologic and skin malignancies in patients using topical calcineurin inhibitors although causality is questionable. Arava Counseling:  Patient counseled regarding adverse effects of Arava including but not limited to nausea, vomiting, abnormalities in liver function tests. Patients may develop mouth sores, rash, diarrhea, and abnormalities in blood counts. The patient understands that monitoring is required including LFTs and blood counts.  There is a rare possibility of scarring of the liver and lung problems that can occur when taking methotrexate. Persistent nausea, loss of appetite, pale stools, dark urine, cough, and shortness of breath should be reported immediately. Patient advised to discontinue Arava treatment and consult with a physician prior to attempting conception. The patient will have to undergo a treatment to eliminate Arava from the body prior to conception. Acitretin Pregnancy And Lactation Text: This medication is Pregnancy Category X and should not be given to women who are pregnant or may become pregnant in the future. This medication is excreted in breast milk. Topical Sulfur Applications Pregnancy And Lactation Text: This medication is Pregnancy Category C and has an unknown safety profile during pregnancy. It is unknown if this topical medication is excreted in breast milk. Opioid Counseling: I discussed with the patient the potential side effects of opioids including but not limited to addiction, altered mental status, and depression. I stressed avoiding alcohol, benzodiazepines, muscle relaxants and sleep aids unless specifically okayed by a physician. The patient verbalized understanding of the proper use and possible adverse effects of opioids. All of the patient's questions and concerns were addressed. They were instructed to flush the remaining pills down the toilet if they did not need them for pain. Odomzo Counseling- I discussed with the patient the risks of Odomzo including but not limited to nausea, vomiting, diarrhea, constipation, weight loss, changes in the sense of taste, decreased appetite, muscle spasms, and hair loss.  The patient verbalized understanding of the proper use and possible adverse effects of Odomzo.  All of the patient's questions and concerns were addressed. Isotretinoin Counseling: Patient should get monthly blood tests, not donate blood, not drive at night if vision affected, not share medication, and not undergo elective surgery for 6 months after tx completed. Side effects reviewed, pt to contact office should one occur. Bactrim Pregnancy And Lactation Text: This medication is Pregnancy Category D and is known to cause fetal risk.  It is also excreted in breast milk. Libtayo Pregnancy And Lactation Text: This medication is contraindicated in pregnancy and when breast feeding. Dupixent Counseling: I discussed with the patient the risks of dupilumab including but not limited to eye infection and irritation, cold sores, injection site reactions, worsening of asthma, allergic reactions and increased risk of parasitic infection.  Live vaccines should be avoided while taking dupilumab. Dupilumab will also interact with certain medications such as warfarin and cyclosporine. The patient understands that monitoring is required and they must alert us or the primary physician if symptoms of infection or other concerning signs are noted. Nsaids Pregnancy And Lactation Text: These medications are considered safe up to 30 weeks gestation. It is excreted in breast milk. Isotretinoin Pregnancy And Lactation Text: This medication is Pregnancy Category X and is considered extremely dangerous during pregnancy. It is unknown if it is excreted in breast milk. Bactrim Counseling:  I discussed with the patient the risks of sulfa antibiotics including but not limited to GI upset, allergic reaction, drug rash, diarrhea, dizziness, photosensitivity, and yeast infections.  Rarely, more serious reactions can occur including but not limited to aplastic anemia, agranulocytosis, methemoglobinemia, blood dyscrasias, liver or kidney failure, lung infiltrates or desquamative/blistering drug rashes. Clofazimine Counseling:  I discussed with the patient the risks of clofazimine including but not limited to skin and eye pigmentation, liver damage, nausea/vomiting, gastrointestinal bleeding and allergy. Picato Counseling:  I discussed with the patient the risks of Picato including but not limited to erythema, scaling, itching, weeping, crusting, and pain. Spironolactone Pregnancy And Lactation Text: This medication can cause feminization of the male fetus and should be avoided during pregnancy. The active metabolite is also found in breast milk. Rifampin Counseling: I discussed with the patient the risks of rifampin including but not limited to liver damage, kidney damage, red-orange body fluids, nausea/vomiting and severe allergy. Tranexamic Acid Pregnancy And Lactation Text: It is unknown if this medication is safe during pregnancy or breast feeding. Hydroxyzine Pregnancy And Lactation Text: This medication is not safe during pregnancy and should not be taken. It is also excreted in breast milk and breast feeding isn't recommended. Erythromycin Counseling:  I discussed with the patient the risks of erythromycin including but not limited to GI upset, allergic reaction, drug rash, diarrhea, increase in liver enzymes, and yeast infections.

## 2022-10-10 ENCOUNTER — OFFICE VISIT (OUTPATIENT)
Dept: FAMILY MEDICINE CLINIC | Facility: CLINIC | Age: 37
End: 2022-10-10
Payer: COMMERCIAL

## 2022-10-10 VITALS
HEIGHT: 63 IN | SYSTOLIC BLOOD PRESSURE: 136 MMHG | DIASTOLIC BLOOD PRESSURE: 80 MMHG | TEMPERATURE: 97.2 F | OXYGEN SATURATION: 99 % | HEART RATE: 78 BPM | BODY MASS INDEX: 34.2 KG/M2 | WEIGHT: 193 LBS

## 2022-10-10 DIAGNOSIS — B34.9 VIRAL ILLNESS: ICD-10-CM

## 2022-10-10 DIAGNOSIS — R20.0 LEFT ARM NUMBNESS: Primary | ICD-10-CM

## 2022-10-10 LAB
SARS-COV-2 AG UPPER RESP QL IA: NEGATIVE
VALID CONTROL: NORMAL

## 2022-10-10 PROCEDURE — 87811 SARS-COV-2 COVID19 W/OPTIC: CPT | Performed by: NURSE PRACTITIONER

## 2022-10-10 PROCEDURE — 99213 OFFICE O/P EST LOW 20 MIN: CPT | Performed by: NURSE PRACTITIONER
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
## 2022-11-13 DIAGNOSIS — K21.9 GASTROESOPHAGEAL REFLUX DISEASE WITHOUT ESOPHAGITIS: ICD-10-CM

## 2022-11-13 RX ORDER — ESOMEPRAZOLE MAGNESIUM 40 MG/1
CAPSULE, DELAYED RELEASE ORAL
Qty: 90 CAPSULE | Refills: 1 | Status: SHIPPED | OUTPATIENT
Start: 2022-11-13

## 2022-11-30 ENCOUNTER — APPOINTMENT (OUTPATIENT)
Dept: RADIOLOGY | Facility: CLINIC | Age: 37
End: 2022-11-30

## 2022-11-30 ENCOUNTER — OFFICE VISIT (OUTPATIENT)
Dept: OBGYN CLINIC | Facility: CLINIC | Age: 37
End: 2022-11-30

## 2022-11-30 VITALS
HEART RATE: 89 BPM | BODY MASS INDEX: 34.2 KG/M2 | SYSTOLIC BLOOD PRESSURE: 121 MMHG | DIASTOLIC BLOOD PRESSURE: 78 MMHG | WEIGHT: 193 LBS | HEIGHT: 63 IN

## 2022-11-30 DIAGNOSIS — M62.838 CERVICAL PARASPINAL MUSCLE SPASM: ICD-10-CM

## 2022-11-30 DIAGNOSIS — M75.32 CALCIFIC TENDINITIS OF LEFT SHOULDER: Primary | ICD-10-CM

## 2022-11-30 DIAGNOSIS — M75.42 SUBACROMIAL IMPINGEMENT OF LEFT SHOULDER: ICD-10-CM

## 2022-11-30 DIAGNOSIS — S16.1XXA CERVICAL STRAIN, INITIAL ENCOUNTER: ICD-10-CM

## 2022-11-30 DIAGNOSIS — M54.12 CERVICAL RADICULOPATHY: ICD-10-CM

## 2022-11-30 DIAGNOSIS — M62.838 TRAPEZIUS MUSCLE SPASM: ICD-10-CM

## 2022-11-30 RX ORDER — NAPROXEN 500 MG/1
500 TABLET ORAL 2 TIMES DAILY WITH MEALS
Qty: 30 TABLET | Refills: 0 | Status: SHIPPED | OUTPATIENT
Start: 2022-11-30

## 2022-11-30 NOTE — PROGRESS NOTES
Assessment/Plan:  Assessment/Plan   Diagnoses and all orders for this visit:    Calcific tendinitis of left shoulder  -     XR shoulder 2+ vw left; Future  -     Ambulatory Referral to Physical Therapy; Future  -     naproxen (Naprosyn) 500 mg tablet; Take 1 tablet (500 mg total) by mouth 2 (two) times a day with meals    Subacromial impingement of left shoulder  -     Ambulatory Referral to Physical Therapy; Future  -     MRI shoulder left wo contrast; Future    Cervical strain, initial encounter  -     XR spine cervical complete 4 or 5 vw non injury; Future  -     Ambulatory Referral to Physical Therapy; Future    Cervical paraspinal muscle spasm  -     Ambulatory Referral to Physical Therapy; Future    Trapezius muscle spasm  -     Ambulatory Referral to Physical Therapy; Future      40-year-old right-hand-dominant female with left shoulder pain and limited range of motion more than 3 months duration  Discussed with patient physical exam, radiographs, impression and plan  X-rays cervical spine noted for slight reversal of lordosis  X-rays left shoulder noted for soft tissue calcification suspicious for calcific tendinitis  Physical exam left shoulder unremarkable for bony or soft tissue tenderness  She is range of motion forward flexion to 140°, abduction 90°, and internal rotation lumbar spine  She is normal strength in rotator cuff  There is mild pain with empty can test and with Best maneuver  She is normal sensation, biceps reflex, and radial pulse both upper extremities  Clinical impression is that she is symptomatic from calcific tendinopathy  I discussed treatment regimen of prescription anti-inflammatory, supplements, and formal therapy  She is to take naproxen 500 mg twice daily with food for 2 weeks  She may apply topical diclofenac gel 3 times a day for the next 10 days  She is to take tumeric at least 1000 mg daily and tart cherry at least 1000 mg daily   I will refer to physical therapy which she is to start as soon as possible and do home exercises directed  I will refer for MRI of left shoulder to evaluate for rotator cuff disorder as more invasive management may be warranted  She will follow up after getting MRI done  Subjective:   Patient ID: Kolton Coronel is a 40 y o  female  Chief Complaint   Patient presents with   • Left Shoulder - Pain       40-year-old right-dominant female presents for evaluation of left shoulder pain more than 3 months duration  Reports onset of symptoms within few days receiving tetanus vaccine  Pain described as gradual in onset, localized to the lateral aspect the upper arm and radiating distally to the forearm and wrist and proximally to the left shoulder, achy and sore and sometimes sharp, constant, worse with moving the arm, associated limited range of motion, associated numbness and tingling, worse with laying on her left side, and improved with resting  She tried taking ibuprofen, icing, and warm compress without any improvement  She saw primary care provider and was referred for EMG study  Shoulder Pain  This is a new problem  The current episode started more than 1 month ago  The problem occurs constantly  The problem has been unchanged  Associated symptoms include arthralgias and numbness  Pertinent negatives include no abdominal pain, chest pain, chills, fever, joint swelling, rash, sore throat or weakness  Exacerbated by: Arm movement  She has tried rest, position changes, NSAIDs, ice and heat for the symptoms  The treatment provided mild relief  The following portions of the patient's history were reviewed and updated as appropriate: She  has a past medical history of Acid reflux, COVID-19, GERD (gastroesophageal reflux disease), and Hyperlipidemia  She  has a past surgical history that includes TONSILECTOMY AND ADNOIDECTOMY; Dilation and curettage of uterus;   section; Tubal ligation; and Tonsillectomy  Her family history includes Heart disease in her mother; No Known Problems in her father  She  reports that she quit smoking about 3 months ago  Her smoking use included cigarettes  She smoked an average of  25 packs per day  She has never used smokeless tobacco  She reports current alcohol use of about 2 0 standard drinks per week  She reports that she does not use drugs  She is allergic to reglan [metoclopramide]       Review of Systems   Constitutional: Negative for chills and fever  HENT: Negative for sore throat  Eyes: Negative for visual disturbance  Respiratory: Negative for shortness of breath  Cardiovascular: Negative for chest pain  Gastrointestinal: Negative for abdominal pain  Genitourinary: Negative for flank pain  Musculoskeletal: Positive for arthralgias  Negative for joint swelling  Skin: Negative for rash and wound  Neurological: Positive for numbness  Negative for weakness  Hematological: Does not bruise/bleed easily  Psychiatric/Behavioral: Negative for self-injury  Objective:  Vitals:    11/30/22 0810   BP: 121/78   Pulse: 89   Weight: 87 5 kg (193 lb)   Height: 5' 3" (1 6 m)     Back Exam     Comments:    Cervical spine  -no tenderness  -normal range of motion  -negative axial load  -negative Spurling's  -normal strength, sensation, and biceps reflex both upper extremities      Right Hand Exam     Muscle Strength   The patient has normal right wrist strength  Other   Sensation: normal  Pulse: present      Left Hand Exam     Muscle Strength   The patient has normal left wrist strength  Other   Sensation: normal  Pulse: present      Right Elbow Exam     Other   Sensation: normal      Left Elbow Exam     Tenderness   The patient is experiencing no tenderness  Range of Motion   The patient has normal left elbow ROM  Muscle Strength   The patient has normal left elbow strength (5/5 flexion and extension)      Other   Sensation: normal      Right Shoulder Exam     Other   Sensation: normal      Left Shoulder Exam     Tenderness   The patient is experiencing no tenderness  Range of Motion   Active abduction: 90   Forward flexion: 140   Internal rotation 0 degrees: Lumbar     Muscle Strength   The patient has normal left shoulder strength  Tests   Best test: positive    Other   Sensation: normal     Comments:  Pain with empty can test          Strength/Myotome Testing     Left Wrist/Hand   Normal wrist strength    Right Wrist/Hand   Normal wrist strength      Physical Exam  Vitals and nursing note reviewed  Constitutional:       General: She is not in acute distress  Appearance: She is well-developed  She is not ill-appearing or diaphoretic  HENT:      Head: Normocephalic  Right Ear: External ear normal       Left Ear: External ear normal    Eyes:      Conjunctiva/sclera: Conjunctivae normal    Neck:      Trachea: No tracheal deviation  Cardiovascular:      Rate and Rhythm: Normal rate  Pulmonary:      Effort: Pulmonary effort is normal  No respiratory distress  Abdominal:      General: There is no distension  Musculoskeletal:         General: No swelling, tenderness, deformity or signs of injury  Skin:     General: Skin is warm and dry  Coloration: Skin is not jaundiced or pale  Neurological:      Mental Status: She is alert and oriented to person, place, and time  Psychiatric:         Mood and Affect: Mood and affect and mood normal          Behavior: Behavior normal          Thought Content: Thought content normal          Judgment: Judgment normal          I have personally reviewed pertinent films in PACS and my interpretation is Left shoulder calcific tendinitis  Cervical spine reversal of lordosis

## 2022-12-05 ENCOUNTER — TELEPHONE (OUTPATIENT)
Dept: OBGYN CLINIC | Facility: CLINIC | Age: 37
End: 2022-12-05

## 2022-12-05 NOTE — TELEPHONE ENCOUNTER
Called patient and LM to let her know a prior [facility] has been started and is still pending  I left my number as a call back

## 2022-12-07 ENCOUNTER — TELEPHONE (OUTPATIENT)
Dept: OBGYN CLINIC | Facility: MEDICAL CENTER | Age: 37
End: 2022-12-07

## 2022-12-07 NOTE — TELEPHONE ENCOUNTER
Called patient to discuss rescheduling her MRI for tomorrow due to Auth still pending with insurance  She is already on a call back list with central scheduling  I advised her to be sure to r/s the MRI at the same [place] location due to the [place] being submitted already

## 2022-12-09 ENCOUNTER — TELEPHONE (OUTPATIENT)
Dept: OBGYN CLINIC | Facility: MEDICAL CENTER | Age: 37
End: 2022-12-09

## 2022-12-09 NOTE — TELEPHONE ENCOUNTER
[facility] is requesting PT records in order to approve the MRI, they are asking for 6 weeks worth of them  It hasn't been denied yet, a peer to peer can be scheduled to try to have it authorized without the PT appointments  The phone number to schedule that through [facility] is [phone]  Her tracking number is [id number]

## 2022-12-19 ENCOUNTER — OFFICE VISIT (OUTPATIENT)
Dept: URGENT CARE | Facility: CLINIC | Age: 37
End: 2022-12-19

## 2022-12-19 VITALS
DIASTOLIC BLOOD PRESSURE: 72 MMHG | HEART RATE: 90 BPM | SYSTOLIC BLOOD PRESSURE: 118 MMHG | HEIGHT: 63 IN | BODY MASS INDEX: 33.13 KG/M2 | OXYGEN SATURATION: 98 % | RESPIRATION RATE: 22 BRPM | TEMPERATURE: 98 F | WEIGHT: 187 LBS

## 2022-12-19 DIAGNOSIS — J22 ACUTE RESPIRATORY INFECTION: Primary | ICD-10-CM

## 2022-12-19 RX ORDER — FLUTICASONE PROPIONATE 50 MCG
1 SPRAY, SUSPENSION (ML) NASAL DAILY
Qty: 9 ML | Refills: 0 | Status: SHIPPED | OUTPATIENT
Start: 2022-12-19

## 2022-12-19 RX ORDER — DOXYCYCLINE 100 MG/1
100 CAPSULE ORAL 2 TIMES DAILY
Qty: 14 CAPSULE | Refills: 0 | Status: SHIPPED | OUTPATIENT
Start: 2022-12-19 | End: 2022-12-26

## 2022-12-19 NOTE — PROGRESS NOTES
[facility] [employer] [facility]        NAME: Pacheco Reich is a 40 y o  female  : 1985    MRN: [id number]  DATE: 2022  TIME: 5:04 PM    Assessment and Plan   Acute respiratory infection [J22]  1  Acute respiratory infection  doxycycline monohydrate (MONODOX) 100 mg capsule    fluticasone (FLONASE) 50 mcg/act nasal spray            Patient Instructions       Follow up with PCP in 3-5 days  Proceed to  ER if symptoms worsen  You are being prescribed doxycycline for infection  Flonase NS for nasal congestion  Take robitussin CF for cough  Drink water  Follow up with your PCP in 3-5 days  Go to the ED if symptoms worsen        Chief Complaint     Chief Complaint   Patient presents with   • Cough     X 2 weeks    • Sinusitis     Green nasal discharge x 2 weeks    • Fever     Started yesterday          History of Present Illness       This is a 40year old female who states has had a cough x 2 weeks and though was getting better and now has sinus discharge - green and fever of 99 to 101  She states she is coughing up thick green chunks of mucous  She has taken 2 days of advil cold and sinus medication  Denies n/v/d  Denies pregnancy  Review of Systems   Review of Systems   Constitutional: Positive for fatigue and fever  HENT: Positive for congestion, postnasal drip, sinus pressure and sinus pain  Eyes: Negative  Respiratory: Positive for cough  Cardiovascular: Negative  Gastrointestinal: Negative  Endocrine: Negative  Genitourinary: Negative  Musculoskeletal: Negative  Skin: Negative  Neurological: Negative  Hematological: Negative  Psychiatric/Behavioral: Negative            Current Medications       Current Outpatient Medications:   •  doxycycline monohydrate (MONODOX) 100 mg capsule, Take 1 capsule (100 mg total) by mouth 2 (two) times a day for 7 days, Disp: 14 capsule, Rfl: 0  •  fluticasone (FLONASE) 50 mcg/act nasal spray, 1 spray into each nostril daily, Disp: 9 mL, Rfl: 0  •  omeprazole (PriLOSEC) 20 mg delayed release capsule, Take 20 mg by mouth daily, Disp: , Rfl:   •  Ascorbic Acid (vitamin C) 1000 MG tablet, Take 1 tablet (1,000 mg total) by mouth every 12 (twelve) hours (Patient not taking: Reported on 3/19/2022), Disp: 20 tablet, Rfl: 0  •  benzonatate (TESSALON PERLES) 100 mg capsule, Take 1 capsule (100 mg total) by mouth 3 (three) times a day as needed for cough (Patient not taking: Reported on 3/19/2022), Disp: 20 capsule, Rfl: 0  •  cholecalciferol (VITAMIN D3) 1,000 units tablet, Take 2 tablets (2,000 Units total) by mouth daily (Patient not taking: Reported on 3/19/2022), Disp: 10 tablet, Rfl: 0  •  dexamethasone (DECADRON) 2 mg tablet, Take 3 tablets (6 mg total) by mouth daily with breakfast for 5 days (Patient not taking: Reported on 3/19/2022), Disp: 15 tablet, Rfl: 0  •  ergocalciferol (VITAMIN D2) 50,000 units, Take 1 capsule (50,000 Units total) by mouth once a week (Patient not taking: Reported on 6/2/2021), Disp: 12 capsule, Rfl: 1  •  esomeprazole (NexIUM) 40 MG capsule, take 1 capsule by mouth daily BEFORE BREAKFAST (Patient not taking: Reported on 12/19/2022), Disp: 90 capsule, Rfl: 1  •  Multiple Vitamin (multivitamin) tablet, Take 1 tablet by mouth daily (Patient not taking: Reported on 3/19/2022), Disp: 30 tablet, Rfl: 0  •  naproxen (Naprosyn) 500 mg tablet, Take 1 tablet (500 mg total) by mouth 2 (two) times a day with meals, Disp: 30 tablet, Rfl: 0  •  nicotine (NICODERM CQ) 21 mg/24 hr TD 24 hr patch, Place 1 patch on the skin daily (Patient not taking: Reported on 8/5/2021), Disp: 28 patch, Rfl: 0  •  ondansetron (ZOFRAN-ODT) 4 mg disintegrating tablet, Take 1 tablet (4 mg total) by mouth every 8 (eight) hours as needed for nausea or vomiting (Patient not taking: Reported on 3/19/2022), Disp: 12 tablet, Rfl: 0    Current Allergies     Allergies as of 12/19/2022 - Reviewed 12/19/2022   Allergen Reaction Noted   • Reglan [metoclopramide] Myalgia 2020            The following portions of the patient's history were reviewed and updated as appropriate: allergies, current medications, past family history, past medical history, past social history, past surgical history and problem list      Past Medical History:   Diagnosis Date   • Acid reflux    • COVID-19    • GERD (gastroesophageal reflux disease)    • Hyperlipidemia        Past Surgical History:   Procedure Laterality Date   •  SECTION     • DILATION AND CURETTAGE OF UTERUS      2x   • TONSILECTOMY AND ADNOIDECTOMY     • TONSILLECTOMY     • TUBAL LIGATION         Family History   Problem Relation Age of Onset   • Heart disease Mother    • No Known Problems Father          Medications have been verified  Objective   /72   Pulse 90   Temp 98 °F (36 7 °C)   Resp 22   Ht 5' 3" (1 6 m)   Wt 84 8 kg (187 lb)   SpO2 98%   BMI 33 13 kg/m²   No LMP recorded  Physical Exam     Physical Exam  Vitals and nursing note reviewed  Constitutional:       General: She is not in acute distress  Appearance: Normal appearance  She is obese  She is not ill-appearing, toxic-appearing or diaphoretic  HENT:      Head: Normocephalic and atraumatic  Right Ear: Tympanic membrane and ear canal normal       Left Ear: Tympanic membrane and ear canal normal       Nose: Congestion and rhinorrhea present  Mouth/Throat:      Mouth: Mucous membranes are moist       Pharynx: Oropharynx is clear  No oropharyngeal exudate or posterior oropharyngeal erythema  Eyes:      Extraocular Movements: Extraocular movements intact  Cardiovascular:      Rate and Rhythm: Normal rate and regular rhythm  Pulses: Normal pulses  Heart sounds: Normal heart sounds  Pulmonary:      Effort: Pulmonary effort is normal       Breath sounds: Normal breath sounds  Musculoskeletal:         General: Normal range of motion        Cervical back: Normal range of motion and neck supple  Skin:     General: Skin is warm and dry  Capillary Refill: Capillary refill takes less than 2 seconds  Neurological:      General: No focal deficit present  Mental Status: She is alert  Psychiatric:         Mood and Affect: Mood normal          Behavior: Behavior normal          Thought Content:  Thought content normal          Judgment: Judgment normal

## 2022-12-19 NOTE — PATIENT INSTRUCTIONS
You are being prescribed doxycycline for infection  Flonase NS for nasal congestion  Take robitussin CF for cough   Drink water  Follow up with your PCP in 3-5 days  Go to the ED if symptoms worsen

## 2023-01-12 ENCOUNTER — EVALUATION (OUTPATIENT)
Dept: PHYSICAL THERAPY | Facility: MEDICAL CENTER | Age: 38
End: 2023-01-12

## 2023-01-12 DIAGNOSIS — M75.42 SUBACROMIAL IMPINGEMENT OF LEFT SHOULDER: ICD-10-CM

## 2023-01-12 DIAGNOSIS — M62.838 TRAPEZIUS MUSCLE SPASM: ICD-10-CM

## 2023-01-12 DIAGNOSIS — M75.32 CALCIFIC TENDINITIS OF LEFT SHOULDER: Primary | ICD-10-CM

## 2023-01-12 DIAGNOSIS — M62.838 CERVICAL PARASPINAL MUSCLE SPASM: ICD-10-CM

## 2023-01-12 DIAGNOSIS — S16.1XXD CERVICAL STRAIN, SUBSEQUENT ENCOUNTER: ICD-10-CM

## 2023-01-12 NOTE — PROGRESS NOTES
PT Evaluation     Today's date: 2023  Patient name: Bao Serra  : 1985  MRN: [id number]  Referring provider: Gerardo Joe  Dx:   Encounter Diagnosis     ICD-10-CM    1  Calcific tendinitis of left shoulder  M75 32 Ambulatory Referral to Physical Therapy      2  Cervical strain, subsequent encounter  S16  1XXD Ambulatory Referral to Physical Therapy      3  Cervical paraspinal muscle spasm  M62 838 Ambulatory Referral to Physical Therapy      4  Trapezius muscle spasm  M62 838 Ambulatory Referral to Physical Therapy      5  Subacromial impingement of left shoulder  M75 42 Ambulatory Referral to Physical Therapy          Start Time: 1230  Stop Time: 1310  Total time in clinic (min): 40 minutes    Assessment  Assessment details: Patient is a 45year old female reporting to therapy with the referring diagnosis of Calcific tendinitis of the left shoulder, Cervical strain, Cervical paraspinal muscle spasm, Trapezius muscle spasm, and Subacromial impingement of left shoulder  Upon exam, patient presents with deficits including decreased shoulder strength, decreased shoulder passive and active ranges of motion, decreased shoulder muscle flexibility, and high irritability pain levels  Patient also had muscle guarding with passive shoulder motions in all directions  Due to patient's deficits she reports main limitations with ADLs that include lifting, carrying, or reaching her arm up  Neurological examination was unremarkable today  She denies any numbness, tingling, or pain above her shoulder level  She is a good candidate for therapy in order to address her stated deficits and improve her function so she can return to all activities with less pain and interference  Negative prognostic factors include chronicity of symptoms and high irritability pain levels           Impairments: abnormal or restricted ROM, activity intolerance, impaired physical strength, lacks appropriate home exercise program, pain with function and poor body mechanics    Symptom irritability: highUnderstanding of Dx/Px/POC: fair   Prognosis: fair    Goals  STG  -Patient will be IND with basic level HEP within 4 weeks in order to make improvements at home   - Patient will have a decrease in 4 points on the NPRS within 5 week in order to improve quality of life  - Patient will have improved shoulder PROM by 20 degrees overhead motion within 5 weeks in order to improve function   - Shoulder MMT of 4-/5 within 6 weeks  LTG  -Patient will be IND with an advanced level HEP within 8 weeks in order to make improvements at home   - Patient will have a decrease in 6 points on the NPRS within 8 week in order to improve quality of life  - Patient will reach her FOTO score goal within 8 weeks  - [facility] shoulder PROM with pain within 8 weeks  - [facility] shoulder AROM with pain <2/10 within 10 weeks  -return to all ADLs within 12 weeks with pain <2/10   - Shoulder MMT of 4+/5 within 12 weeks    Plan  Plan details: Skilled PT to improve strength, ROM, flexibility, endurance, pain, and function  Patient would benefit from: PT eval and skilled physical therapy  Referral necessary: No  Planned modality interventions: biofeedback, cryotherapy and thermotherapy: hydrocollator packs  Planned therapy interventions: manual therapy, joint mobilization, massage, neuromuscular re-education, muscle pump exercises, patient education, strengthening, stretching, therapeutic activities, therapeutic exercise, flexibility, functional ROM exercises, home exercise program, body mechanics training, ADL retraining, graded exercise and graded activity  Frequency: 2x week  Duration in weeks: 12  Plan of Care beginning date: 1/12/2023  Plan of Care expiration date: 4/6/2023  Treatment plan discussed with: patient        Subjective Evaluation    History of Present Illness  Mechanism of injury: Patient states that her pain started with a tetanus shot in August 2022   Following this she was in so much pain that she went to her doctor where she got an xray which showed "two calcium deposits and tendonitis" in her shouler  Currently, she locates her pain over her anterior/later proximal arm and her anterior shoulder  She denies any pain or discomfort in her neck or above her shoulder level  She still describes her pain as "constant" and "aweful" since first noticing it  Pain is increased with getting dressed, putting on her seat belt, lifting her arm with any kind of weight, or carrying the groceries  She describes her pain as being "a deep muscle pain", "sharp", and a "weakness"  She is unable to recall anything in particular that makes it better  She states her pain does not go away and there is always a baseline level  She denies having any numbness or tingling symptoms  Her goals for therapy are to "not be in pain"  Not a recurrent problem   Quality of life: fair    Pain  Current pain ratin  At best pain ratin  At worst pain ratin  Quality: sharp  Relieving factors: relaxation and rest  Aggravating factors: lifting and overhead activity  Progression: no change      Diagnostic Tests  X-ray: abnormal  Treatments  No previous or current treatments  Patient Goals  Patient goals for therapy: increased strength, independence with ADLs/IADLs, return to sport/leisure activities, increased motion and decreased pain          Objective     Palpation   Left   No palpable tenderness to the upper trapezius  Right   No palpable tenderness to the upper trapezius       Additional Palpation Details  No tenderness with palpation to shoulder    Neurological Testing     Sensation     Shoulder   Left Shoulder   Intact: light touch    Right Shoulder   Intact: light touch    Reflexes   Left   Biceps (C5/C6): normal (2+)  Brachioradialis (C6): normal (2+)  Williamson's reflex: negative    Right   Biceps (C5/C6): normal (2+)  Brachioradialis (C6): normal (2+)  Williamson's reflex: negative    Additional Neurological Details  Upper extremity dermatomes/myotomes WNL    Active Range of Motion   Left Shoulder   Flexion: 67 degrees with pain  Abduction: 55 degrees with pain  External rotation BTH: Active external rotation behind the head: motion to lateral neck, unable to reach behind her head  Internal rotation BTB: sacrum     Right Shoulder   External rotation BTH: T2   Internal rotation BTB: T6     Passive Range of Motion   Left Shoulder   Flexion: 95 degrees with pain  Abduction: 92 degrees with pain  External rotation 90°: 40 degrees with pain  Internal rotation 90°: 65 degrees with pain    Additional Passive Range of Motion Details  Muscle guarding present with all passive motions     Strength/Myotome Testing     Left Shoulder     Planes of Motion   Flexion: 3-   Abduction: 3-   External rotation at 0°: 4   Internal rotation at 0°: 5     Right Shoulder   Normal muscle strength    Planes of Motion   External rotation at 0°: 4+   Internal rotation at 0°: 5     Tests     Left Shoulder   Positive empty can and Hawkin's  Negative belly press, drop arm, external rotation lag sign and horn blower         Flowsheet Rows    Flowsheet Row Most Recent Value   PT/OT G-Codes    Current Score 34   Projected Score 62             Precautions:   Past Medical History:   Diagnosis Date   • Acid reflux    • COVID-19    • GERD (gastroesophageal reflux disease)    • Hyperlipidemia            Manuals 1/12            PROM                                                    Neuro Re-Ed             Shoulder isometrics                                                                                           Ther Ex             Shoulder flexion Dowel prom 1x10 supine            Shoulder abduction Dowel prom 1x10 supine            Shoulder ER 20x dowel prom            Table slides 20x flex            Ladder climbs              Pball roll outs                                       Ther Activity Gait Training                                       Modalities

## 2023-01-17 ENCOUNTER — OFFICE VISIT (OUTPATIENT)
Dept: PHYSICAL THERAPY | Facility: MEDICAL CENTER | Age: 38
End: 2023-01-17

## 2023-01-17 DIAGNOSIS — M75.42 SUBACROMIAL IMPINGEMENT OF LEFT SHOULDER: ICD-10-CM

## 2023-01-17 DIAGNOSIS — S16.1XXD CERVICAL STRAIN, SUBSEQUENT ENCOUNTER: ICD-10-CM

## 2023-01-17 DIAGNOSIS — M62.838 CERVICAL PARASPINAL MUSCLE SPASM: ICD-10-CM

## 2023-01-17 DIAGNOSIS — M75.32 CALCIFIC TENDINITIS OF LEFT SHOULDER: Primary | ICD-10-CM

## 2023-01-17 DIAGNOSIS — M62.838 TRAPEZIUS MUSCLE SPASM: ICD-10-CM

## 2023-01-17 NOTE — PROGRESS NOTES
Daily Note     Today's date: 2023  Patient name: Gisselle Richter  : 1985  MRN: [id number]  Referring provider: Michelle Rodgers  Dx:   Encounter Diagnosis     ICD-10-CM    1  Calcific tendinitis of left shoulder  M75 32       2  Cervical strain, subsequent encounter  S16  1XXD       3  Subacromial impingement of left shoulder  M75 42       4  Cervical paraspinal muscle spasm  M62 838       5  Trapezius muscle spasm  M62 838           Start Time: 1136  Stop Time: 1204  Total time in clinic (min): 28 minutes    Subjective: Patient reports that her shoulder "hurts"  She ranks her resting shoulder pain 7/10 at the start of the session today  She locates the area of discomfort over her lateral shoulder  Objective: See treatment diary below      Assessment: Tolerated treatment fair  Exercises kept to passive motions today due to pre session high irritability pain levels  Range of motion overall was limited today as patient was unable to display any overhead movements past approximately  degrees  The patient was educated on the goal of range of motion exercises and to avoid pushing into a degree of movement that increases pain levels  Patient would benefit from continued PT  Based on observation cervical motions were WNL and pain free  Plan: Continue per plan of care        Precautions:   Past Medical History:   Diagnosis Date   • Acid reflux    • COVID-19    • GERD (gastroesophageal reflux disease)    • Hyperlipidemia            Manuals            PROM  TE                                                  Neuro Re-Ed             Shoulder isometrics                                                                                           Ther Ex             Shoulder flexion Dowel prom 1x10 supine Dowel prom 2x10 supine           Shoulder abduction Dowel prom 1x10 supine Dowel prom 2x10 supine           Shoulder ER 20x dowel prom 20x dowel prom           Table slides 20x flex 20x flex/abd           pullies  30x           Ladder climbs              Pball roll outs                                       Ther Activity                                       Gait Training                                       Modalities

## 2023-01-19 ENCOUNTER — APPOINTMENT (OUTPATIENT)
Dept: PHYSICAL THERAPY | Facility: MEDICAL CENTER | Age: 38
End: 2023-01-19

## 2023-01-23 ENCOUNTER — APPOINTMENT (OUTPATIENT)
Dept: PHYSICAL THERAPY | Facility: MEDICAL CENTER | Age: 38
End: 2023-01-23

## 2023-01-24 ENCOUNTER — OFFICE VISIT (OUTPATIENT)
Dept: PHYSICAL THERAPY | Facility: MEDICAL CENTER | Age: 38
End: 2023-01-24

## 2023-01-24 DIAGNOSIS — S16.1XXD CERVICAL STRAIN, SUBSEQUENT ENCOUNTER: ICD-10-CM

## 2023-01-24 DIAGNOSIS — M62.838 TRAPEZIUS MUSCLE SPASM: ICD-10-CM

## 2023-01-24 DIAGNOSIS — M75.42 SUBACROMIAL IMPINGEMENT OF LEFT SHOULDER: ICD-10-CM

## 2023-01-24 DIAGNOSIS — M75.32 CALCIFIC TENDINITIS OF LEFT SHOULDER: Primary | ICD-10-CM

## 2023-01-24 DIAGNOSIS — M62.838 CERVICAL PARASPINAL MUSCLE SPASM: ICD-10-CM

## 2023-01-24 NOTE — PROGRESS NOTES
Daily Note     Today's date: 2023  Patient name: Sravani Hall  : 1985  MRN: [id number]  Referring provider: Yoselin Lazo  Dx:   Encounter Diagnosis     ICD-10-CM    1  Calcific tendinitis of left shoulder  M75 32       2  Cervical strain, subsequent encounter  S16  1XXD       3  Subacromial impingement of left shoulder  M75 42       4  Cervical paraspinal muscle spasm  M62 838       5  Trapezius muscle spasm  M62 838           Start Time: 1150  Stop Time: 1230  Total time in clinic (min): 40 minutes    Subjective: Patient reports her shoulder is the "same"  She ranks her pain at 7/10 at the start of the session  Objective: See treatment diary below      Assessment: Tolerated treatment fair  Shoulder motion remains limited as patient displays arm elevation to about shoulder height today  Pain remained constant at baseline levels throughout the session  Continued PROM and AAROM exercises to help restore full movement  Patient would benefit from continued PT  Plan: Continue per plan of care        Precautions:   Past Medical History:   Diagnosis Date   • Acid reflux    • COVID-19    • GERD (gastroesophageal reflux disease)    • Hyperlipidemia          PT 1:1 time 1509-0406  Manuals           PROM  TE TE                                                  Neuro Re-Ed             Shoulder isometrics                                                                                           Ther Ex             Shoulder flexion Dowel prom 1x10 supine Dowel prom 2x10 supine Dowel prom 3x10 supine          Shoulder abduction Dowel prom 1x10 supine Dowel prom 2x10 supine Dowel prom 3x10 supine          Shoulder ER 20x dowel prom 20x dowel prom 30x dowel prom          Table slides 20x flex 20x flex/abd 20x flex/abd          pullies  30x 5'          Ladder climbs    x10          Pball roll outs                                       Ther Activity Gait Training                                       Modalities

## 2023-01-25 ENCOUNTER — APPOINTMENT (OUTPATIENT)
Dept: PHYSICAL THERAPY | Facility: MEDICAL CENTER | Age: 38
End: 2023-01-25

## 2023-01-30 ENCOUNTER — OFFICE VISIT (OUTPATIENT)
Dept: PHYSICAL THERAPY | Facility: MEDICAL CENTER | Age: 38
End: 2023-01-30

## 2023-01-30 ENCOUNTER — TELEPHONE (OUTPATIENT)
Dept: OBGYN CLINIC | Facility: HOSPITAL | Age: 38
End: 2023-01-30

## 2023-01-30 DIAGNOSIS — M75.32 CALCIFIC TENDINITIS OF LEFT SHOULDER: Primary | ICD-10-CM

## 2023-01-30 DIAGNOSIS — M62.838 TRAPEZIUS MUSCLE SPASM: ICD-10-CM

## 2023-01-30 DIAGNOSIS — S16.1XXD CERVICAL STRAIN, SUBSEQUENT ENCOUNTER: ICD-10-CM

## 2023-01-30 DIAGNOSIS — M75.42 SUBACROMIAL IMPINGEMENT OF LEFT SHOULDER: ICD-10-CM

## 2023-01-30 DIAGNOSIS — M62.838 CERVICAL PARASPINAL MUSCLE SPASM: ICD-10-CM

## 2023-01-30 NOTE — TELEPHONE ENCOUNTER
Caller: Ruy Cui    Doctor: Nissa Antunez // [facility] Page     Reason for call: Patient is in her 4 th week of PT  Left shoulder pain is worse  PT releasing her to return to have MRI completed  Prior MRI was denied by insurance       Please advise     Call [phone]

## 2023-01-30 NOTE — PROGRESS NOTES
Daily Note     Today's date: 2023  Patient name: Ajit oG  : 1985  MRN: [id number]  Referring provider: Vahid Palacios  Dx:   Encounter Diagnosis     ICD-10-CM    1  Calcific tendinitis of left shoulder  M75 32       2  Cervical strain, subsequent encounter  S16  1XXD       3  Subacromial impingement of left shoulder  M75 42       4  Cervical paraspinal muscle spasm  M62 838       5  Trapezius muscle spasm  M62 838           Start Time: 1230  Stop Time: 1245  Total time in clinic (min): 15 minutes    Subjective: Patient reports she feels like her shoulder is "getting worst"  She has noticed this over the past week  She ranks her pain a consistent 7/10 at rest and when she moves her shoulder she rankes her pain up to 9/10  Due to her pain she is having trouble finding a comfortable position to sleep or getting dressed  She feels after she does her therapy exercises her shoulder is "worse"  Objective: See treatment diary below      Assessment: Patient tolerated therapy exercises fairly poor today  Interventions were kept to therapist passive motions  Patient notes high irritability of pain (9/10) and increases in pain with passive motions  As observed with passive motions today, shoulder movements the patient displays remain limited into all directions  Due to high irritability of her reported pain levels, poor tolerance to exercises, and reports of worsening symptoms following light passive motions therapy will be placed on hold at this time  The patient was recommended to follow up with her referring provider  She was in agreement with this plan  We will proceed as advised by her referring MD      Plan: Continue per plan of care        Precautions:   Past Medical History:   Diagnosis Date   • Acid reflux    • COVID-19    • GERD (gastroesophageal reflux disease)    • Hyperlipidemia          PT 1:1 time  Manuals          PROM  TE TE  TE Neuro Re-Ed             Shoulder isometrics                                                                                           Ther Ex             Shoulder flexion Dowel prom 1x10 supine Dowel prom 2x10 supine Dowel prom 3x10 supine          Shoulder abduction Dowel prom 1x10 supine Dowel prom 2x10 supine Dowel prom 3x10 supine          Shoulder ER 20x dowel prom 20x dowel prom 30x dowel prom          Table slides 20x flex 20x flex/abd 20x flex/abd          pullies  30x 5'          Ladder climbs    x10          Pball roll outs                                       Ther Activity                                       Gait Training                                       Modalities

## 2023-02-02 ENCOUNTER — OFFICE VISIT (OUTPATIENT)
Dept: OBGYN CLINIC | Facility: CLINIC | Age: 38
End: 2023-02-02

## 2023-02-02 VITALS
HEIGHT: 63 IN | DIASTOLIC BLOOD PRESSURE: 100 MMHG | SYSTOLIC BLOOD PRESSURE: 142 MMHG | BODY MASS INDEX: 34.2 KG/M2 | WEIGHT: 193 LBS | HEART RATE: 83 BPM

## 2023-02-02 DIAGNOSIS — S46.002D ROTATOR CUFF INJURY, LEFT, SUBSEQUENT ENCOUNTER: Primary | ICD-10-CM

## 2023-02-02 RX ORDER — KETOROLAC TROMETHAMINE 10 MG/1
10 TABLET, FILM COATED ORAL 2 TIMES DAILY PRN
Qty: 20 TABLET | Refills: 0 | Status: SHIPPED | OUTPATIENT
Start: 2023-02-02

## 2023-02-02 NOTE — PROGRESS NOTES
Assessment/Plan:  Assessment/Plan   Diagnoses and all orders for this visit:    Rotator cuff injury, left, subsequent encounter  -     MRI shoulder left wo contrast; Future  -     ketorolac (TORADOL) 10 mg tablet; Take 1 tablet (10 mg total) by mouth 2 (two) times a day as needed for moderate pain      45year-old right-hand-dominant female with left shoulder pain and limited range of motion more than 5 months duration  Discussed with patient physical exam, radiographs, impression, and plan  X-rays of the left shoulder is unremarkable for acute osseous abnormality, but noted for small subacromial calcification  Physical exam left shoulder noted for tenderness at the anterior and lateral aspects  She has range of motion limited to forward flexion of 90 degrees, abduction 70 degrees, and internal rotation to the lateral hip  She has 4+/5 strength external rotation and supraspinatus  There is positive empty can test and positive Best  Clinical impression is that she has symptoms from rotator cuff pathology  She is more than 5 months since onset of symptoms and have been worsening worsening despite conservative management of naproxen 500 mg twice daily since 11/30/2022, more than 3 months of alternating between ice and heat, formal therapy and home exercises since 1/12/2023 at this time I will refer her for MRI of the left shoulder to evaluate for rotator cuff tear as invasive management may be warranted  She will follow-up with me after getting MRI done  Subjective:   Patient ID: Tae Harrison is a 45 y o  female  Chief Complaint   Patient presents with   • Left Shoulder - Follow-up, Pain       79-year-old right-hand-dominant female following up for left shoulder pain and limited range of motion of more than 5 months duration  She was last seen by me more than 2 months ago at which point she was referred to formal therapy and prescribed naproxen 500 twice daily    She reports worsening of symptoms since her last visit, and symptoms are worsened after therapy sessions  She has been attending formal therapy and doing home exercises since 1/12/2023 she was formally discharged from therapy due to worsening of symptoms  She has pain described as generalized to the shoulder worse at the anterior aspect, rating distally along the anterior lateral aspect the upper arm, constant, achy and burning and throbbing and sometimes sharp, worse with movement of the arm particularly elevating above shoulder level and reaching behind the back, associated with limited range of motion, and improved with resting  She has also been applying ice and heat  She has difficulty sleeping due to symptoms  Shoulder Pain  This is a new problem  The current episode started more than 1 month ago  The problem occurs constantly  The problem has been gradually worsening  Associated symptoms include arthralgias and weakness  Pertinent negatives include no joint swelling or numbness  Exacerbated by: Arm movement  She has tried rest, NSAIDs, ice and heat (Physical therapy, home exercise) for the symptoms  The treatment provided mild relief  Review of Systems   Musculoskeletal: Positive for arthralgias  Negative for joint swelling  Neurological: Positive for weakness  Negative for numbness  Objective:  Vitals:    02/02/23 0755   BP: 142/100   Pulse: 83   Weight: 87 5 kg (193 lb)   Height: 5' 3" (1 6 m)     Left Elbow Exam     Tenderness   The patient is experiencing no tenderness  Range of Motion   The patient has normal left elbow ROM  Muscle Strength   The patient has normal left elbow strength (5/5 flexion and extension)  Left Shoulder Exam     Tenderness   Left shoulder tenderness location: Anterior, lateral     Range of Motion   Active abduction: 70   Forward flexion: 90   Left shoulder internal rotation 0 degrees: Lateral hip       Muscle Strength   Left shoulder normal muscle strength: 4+/5 external rotation and supraspinatus  Abduction: 5/5   Internal rotation: 5/5     Tests   Best test: positive    Comments:  Positive empty can test            Physical Exam  Vitals and nursing note reviewed  Constitutional:       General: She is not in acute distress  Appearance: She is well-developed  She is not ill-appearing or diaphoretic  HENT:      Head: Normocephalic  Right Ear: External ear normal       Left Ear: External ear normal    Eyes:      Conjunctiva/sclera: Conjunctivae normal    Neck:      Trachea: No tracheal deviation  Cardiovascular:      Rate and Rhythm: Normal rate  Pulmonary:      Effort: Pulmonary effort is normal  No respiratory distress  Abdominal:      General: There is no distension  Musculoskeletal:         General: Tenderness present  No swelling, deformity or signs of injury  Skin:     General: Skin is warm and dry  Coloration: Skin is not jaundiced or pale  Neurological:      Mental Status: She is alert and oriented to person, place, and time  Psychiatric:         Mood and Affect: Mood normal          Behavior: Behavior normal          Thought Content:  Thought content normal          Judgment: Judgment normal

## 2023-02-08 ENCOUNTER — APPOINTMENT (OUTPATIENT)
Dept: LAB | Facility: CLINIC | Age: 38
End: 2023-02-08

## 2023-02-08 DIAGNOSIS — E03.9 HYPOTHYROIDISM, UNSPECIFIED TYPE: ICD-10-CM

## 2023-02-08 DIAGNOSIS — D64.9 RELATIVE ANEMIA: ICD-10-CM

## 2023-02-08 DIAGNOSIS — Z79.899 ENCOUNTER FOR LONG-TERM (CURRENT) USE OF OTHER MEDICATIONS: ICD-10-CM

## 2023-02-08 DIAGNOSIS — L65.8 FEMALE PATTERN ALOPECIA: ICD-10-CM

## 2023-02-08 LAB
ALBUMIN SERPL BCP-MCNC: 3.6 G/DL (ref 3.5–5)
ALP SERPL-CCNC: 60 U/L (ref 46–116)
ALT SERPL W P-5'-P-CCNC: 21 U/L (ref 12–78)
ANION GAP SERPL CALCULATED.3IONS-SCNC: 3 MMOL/L (ref 4–13)
AST SERPL W P-5'-P-CCNC: 8 U/L (ref 5–45)
BILIRUB SERPL-MCNC: 0.17 MG/DL (ref 0.2–1)
BUN SERPL-MCNC: 17 MG/DL (ref 5–25)
CALCIUM SERPL-MCNC: 9.3 MG/DL (ref 8.3–10.1)
CHLORIDE SERPL-SCNC: 108 MMOL/L (ref 96–108)
CO2 SERPL-SCNC: 25 MMOL/L (ref 21–32)
CREAT SERPL-MCNC: 0.77 MG/DL (ref 0.6–1.3)
ERYTHROCYTE [DISTWIDTH] IN BLOOD BY AUTOMATED COUNT: 15 % (ref 11.6–15.1)
GFR SERPL CREATININE-BSD FRML MDRD: 98 ML/MIN/1.73SQ M
GLUCOSE P FAST SERPL-MCNC: 101 MG/DL (ref 65–99)
HCT VFR BLD AUTO: 37.3 % (ref 34.8–46.1)
HGB BLD-MCNC: 11.5 G/DL (ref 11.5–15.4)
MCH RBC QN AUTO: 26.6 PG (ref 26.8–34.3)
MCHC RBC AUTO-ENTMCNC: 30.8 G/DL (ref 31.4–37.4)
MCV RBC AUTO: 86 FL (ref 82–98)
PLATELET # BLD AUTO: 358 THOUSANDS/UL (ref 149–390)
PMV BLD AUTO: 9.9 FL (ref 8.9–12.7)
POTASSIUM SERPL-SCNC: 4.8 MMOL/L (ref 3.5–5.3)
PROT SERPL-MCNC: 7.1 G/DL (ref 6.4–8.4)
RBC # BLD AUTO: 4.33 MILLION/UL (ref 3.81–5.12)
SODIUM SERPL-SCNC: 136 MMOL/L (ref 135–147)
T4 FREE SERPL-MCNC: 0.73 NG/DL (ref 0.76–1.46)
TSH SERPL DL<=0.05 MIU/L-ACNC: 1.63 UIU/ML (ref 0.45–4.5)
WBC # BLD AUTO: 8.43 THOUSAND/UL (ref 4.31–10.16)

## 2023-02-20 ENCOUNTER — HOSPITAL ENCOUNTER (OUTPATIENT)
Dept: MRI IMAGING | Facility: CLINIC | Age: 38
Discharge: HOME | End: 2023-02-20

## 2023-02-20 DIAGNOSIS — S46.002D ROTATOR CUFF INJURY, LEFT, SUBSEQUENT ENCOUNTER: ICD-10-CM

## 2023-02-27 ENCOUNTER — TELEPHONE (OUTPATIENT)
Dept: OBGYN CLINIC | Facility: CLINIC | Age: 38
End: 2023-02-27

## 2023-02-27 ENCOUNTER — OFFICE VISIT (OUTPATIENT)
Dept: OBGYN CLINIC | Facility: CLINIC | Age: 38
End: 2023-02-27

## 2023-02-27 VITALS
HEART RATE: 75 BPM | DIASTOLIC BLOOD PRESSURE: 86 MMHG | BODY MASS INDEX: 34.38 KG/M2 | SYSTOLIC BLOOD PRESSURE: 123 MMHG | HEIGHT: 63 IN | WEIGHT: 194 LBS

## 2023-02-27 DIAGNOSIS — M75.32 CALCIFIC TENDINITIS OF LEFT SHOULDER: Primary | ICD-10-CM

## 2023-02-27 NOTE — PROGRESS NOTES
Assessment/Plan:  Assessment/Plan   Diagnoses and all orders for this visit:    Calcific tendinitis of left shoulder        66-year-old right-hand-dominant female with left shoulder pain more than 6 months duration  Discussed with patient MRI results, impression, and plan  MRI left shoulder noted for 0 6 x 0 2 cm intensity focus within distal supraspinatus corresponding to calcific tendinopathy, without appreciable full-thickness rotator cuff tear, mild distal supraspinatus tendinosis, minimal AC joint arthritis  Clinical impression is that she is symptomatic from calcific tendinopathy  She has not improved with formal therapy and NSAIDs  I discussed with patient option of corticosteroid injection to which she declined  At this time I will refer her to my sports medicine colleague for evaluation and consideration of barbotage  She will follow-up with me as needed  Subjective:   Patient ID: Darren Murry is a 45 y o  female  Chief Complaint   Patient presents with   • Left Shoulder - Follow-up, Pain       66-year-old right-hand-dominant female following up for left shoulder pain of more than 6 months duration  She was last seen by me nearly 4 weeks ago at which point she was referred for MRI of the left shoulder  She denies any changes in symptoms since her last visit  She has pain described as generalized to the shoulder but worse at the anterior and superior aspect, radiating distally along the anterior lateral aspect the upper arm, constant, achy and burning and throbbing and sometimes sharp, worse with moving the arm particularly elevating above shoulder level and reaching behind the back, and improved with resting  She does have a toddler at home and reports aggravation of symptoms with lifting her child also with test such as lifting groceries  Shoulder Pain  This is a new problem  The current episode started more than 1 month ago  The problem occurs constantly   The problem has been unchanged  Associated symptoms include arthralgias  Pertinent negatives include no joint swelling, numbness or weakness  Exacerbated by: Arm movement  She has tried rest and NSAIDs (Physical therapy) for the symptoms  The treatment provided mild relief  Review of Systems   Musculoskeletal: Positive for arthralgias  Negative for joint swelling  Neurological: Negative for weakness and numbness  Objective:  Vitals:    02/27/23 0755   BP: 123/86   Pulse: 75   Weight: 88 kg (194 lb)   Height: 5' 3" (1 6 m)     Ortho Exam    Physical Exam  Vitals and nursing note reviewed  Constitutional:       General: She is not in acute distress  Appearance: She is well-developed  She is not ill-appearing or diaphoretic  HENT:      Head: Normocephalic  Right Ear: External ear normal       Left Ear: External ear normal    Eyes:      Conjunctiva/sclera: Conjunctivae normal    Neck:      Trachea: No tracheal deviation  Cardiovascular:      Rate and Rhythm: Normal rate  Pulmonary:      Effort: Pulmonary effort is normal  No respiratory distress  Abdominal:      General: There is no distension  Skin:     General: Skin is warm and dry  Coloration: Skin is not jaundiced  Neurological:      Mental Status: She is alert and oriented to person, place, and time  Psychiatric:         Mood and Affect: Mood normal          Behavior: Behavior normal          Thought Content: Thought content normal          Judgment: Judgment normal          I have personally reviewed pertinent films in PACS and my interpretation is No appreciable full-thickness rotator cuff tear

## 2023-03-15 ENCOUNTER — TELEPHONE (OUTPATIENT)
Dept: OBGYN CLINIC | Facility: HOSPITAL | Age: 38
End: 2023-03-15

## 2023-03-15 NOTE — TELEPHONE ENCOUNTER
Caller: [employer] [facility] [employer]    Doctor: n/a    Reason for call: called for fax number for patient     Call back#: [phone]

## 2023-03-16 NOTE — TELEPHONE ENCOUNTER
Caller: Minerva Engle    Doctor: viv    Reason for call: called back to confirm fax number for medical record request    Call back#: [phone]

## 2023-03-22 ENCOUNTER — APPOINTMENT (OUTPATIENT)
Dept: LAB | Facility: CLINIC | Age: 38
End: 2023-03-22

## 2023-03-22 ENCOUNTER — TELEPHONE (OUTPATIENT)
Dept: OBGYN CLINIC | Facility: CLINIC | Age: 38
End: 2023-03-22

## 2023-03-22 DIAGNOSIS — L65.8 OTHER SPECIFIED NONSCARRING HAIR LOSS: ICD-10-CM

## 2023-03-22 DIAGNOSIS — Z79.899 HIGH RISK MEDICATION USE: ICD-10-CM

## 2023-03-22 LAB
ALBUMIN SERPL BCP-MCNC: 3.8 G/DL (ref 3.5–5)
ALP SERPL-CCNC: 59 U/L (ref 46–116)
ALT SERPL W P-5'-P-CCNC: 25 U/L (ref 12–78)
ANION GAP SERPL CALCULATED.3IONS-SCNC: 2 MMOL/L (ref 4–13)
AST SERPL W P-5'-P-CCNC: 12 U/L (ref 5–45)
BILIRUB SERPL-MCNC: 0.34 MG/DL (ref 0.2–1)
BUN SERPL-MCNC: 13 MG/DL (ref 5–25)
CALCIUM SERPL-MCNC: 9.6 MG/DL (ref 8.3–10.1)
CHLORIDE SERPL-SCNC: 106 MMOL/L (ref 96–108)
CO2 SERPL-SCNC: 27 MMOL/L (ref 21–32)
CREAT SERPL-MCNC: 0.81 MG/DL (ref 0.6–1.3)
GFR SERPL CREATININE-BSD FRML MDRD: 92 ML/MIN/1.73SQ M
GLUCOSE P FAST SERPL-MCNC: 107 MG/DL (ref 65–99)
POTASSIUM SERPL-SCNC: 4.3 MMOL/L (ref 3.5–5.3)
PROT SERPL-MCNC: 7.5 G/DL (ref 6.4–8.4)
SODIUM SERPL-SCNC: 135 MMOL/L (ref 135–147)

## 2023-03-27 NOTE — TELEPHONE ENCOUNTER
I s/w her and she understood about the proc  w/ Ellen Netters not doing them anymore but she isn't sure she wants to have a CSI  She has heard it weakens tendons or muscles more  She is going to think about scheduling an appt  For one and will call me back when she decides

## 2023-04-05 ENCOUNTER — TELEPHONE (OUTPATIENT)
Dept: OBGYN CLINIC | Facility: HOSPITAL | Age: 38
End: 2023-04-05

## 2023-04-05 NOTE — TELEPHONE ENCOUNTER
Caller: patient     Doctor: Severo Pride    Reason for call: patient called in requesting MRI of left shoulder to be uploaded to the [employer] system      Call back#: [phone]

## 2023-04-05 NOTE — TELEPHONE ENCOUNTER
Lm for pt that we don not have access to do this and she may request a cd with her images which she can do through my chart or she an come to one of the offices to  a form to request it as well

## 2023-05-03 ENCOUNTER — TELEPHONE (OUTPATIENT)
Dept: GASTROENTEROLOGY | Facility: CLINIC | Age: 38
End: 2023-05-03

## 2023-05-03 NOTE — TELEPHONE ENCOUNTER
Patients GI provider:  Hazel SHAH    Number to return call: [phone] until 4 or [phone]    Reason for call: Pt calling because she was in the [facility] and became sick her last day there  She states since then she is have pain in her stomach and it is hard and sticking out  She states she is also nauseous and having diarrhea  She states she went to Urgent Care but they didn't find anything  She doesn't want to wait to be seen until next Tuesday  She would like to know if she should just go to the ED      Scheduled procedure/appointment date if applicable: N/A

## 2023-05-03 NOTE — TELEPHONE ENCOUNTER
Spoke with the pt, adfised her to head to the ER due to current symptoms   Patient verbalized understanding and stated she will head to the ED     Follow up appointment also scheduled for next week

## 2023-05-04 ENCOUNTER — HOSPITAL ENCOUNTER (EMERGENCY)
Facility: HOSPITAL | Age: 38
Discharge: HOME | End: 2023-05-04
Attending: EMERGENCY MEDICINE

## 2023-05-04 ENCOUNTER — APPOINTMENT (EMERGENCY)
Dept: CT IMAGING | Facility: HOSPITAL | Age: 38
End: 2023-05-04

## 2023-05-04 VITALS
DIASTOLIC BLOOD PRESSURE: 78 MMHG | TEMPERATURE: 97.9 F | HEART RATE: 67 BPM | RESPIRATION RATE: 16 BRPM | SYSTOLIC BLOOD PRESSURE: 136 MMHG | OXYGEN SATURATION: 100 %

## 2023-05-04 DIAGNOSIS — K52.9 GASTROENTERITIS: ICD-10-CM

## 2023-05-04 DIAGNOSIS — R10.9 ABDOMINAL PAIN: Primary | ICD-10-CM

## 2023-05-04 LAB
ALBUMIN SERPL BCP-MCNC: 4.4 G/DL (ref 3.5–5)
ALP SERPL-CCNC: 58 U/L (ref 34–104)
ALT SERPL W P-5'-P-CCNC: 14 U/L (ref 7–52)
ANION GAP SERPL CALCULATED.3IONS-SCNC: 8 MMOL/L (ref 4–13)
AST SERPL W P-5'-P-CCNC: 10 U/L (ref 13–39)
BACTERIA UR QL AUTO: ABNORMAL /HPF
BASOPHILS # BLD AUTO: 0.05 THOUSANDS/ΜL (ref 0–0.1)
BASOPHILS NFR BLD AUTO: 1 % (ref 0–1)
BILIRUB SERPL-MCNC: 0.28 MG/DL (ref 0.2–1)
BILIRUB UR QL STRIP: NEGATIVE
BUN SERPL-MCNC: 15 MG/DL (ref 5–25)
CALCIUM SERPL-MCNC: 9.7 MG/DL (ref 8.4–10.2)
CHLORIDE SERPL-SCNC: 101 MMOL/L (ref 96–108)
CLARITY UR: CLEAR
CO2 SERPL-SCNC: 25 MMOL/L (ref 21–32)
COLOR UR: ABNORMAL
CREAT SERPL-MCNC: 0.77 MG/DL (ref 0.6–1.3)
EOSINOPHIL # BLD AUTO: 0.1 THOUSAND/ΜL (ref 0–0.61)
EOSINOPHIL NFR BLD AUTO: 1 % (ref 0–6)
ERYTHROCYTE [DISTWIDTH] IN BLOOD BY AUTOMATED COUNT: 14.5 % (ref 11.6–15.1)
GFR SERPL CREATININE-BSD FRML MDRD: 98 ML/MIN/1.73SQ M
GLUCOSE SERPL-MCNC: 100 MG/DL (ref 65–140)
GLUCOSE UR STRIP-MCNC: NEGATIVE MG/DL
HCG SERPL QL: NEGATIVE
HCT VFR BLD AUTO: 39.3 % (ref 34.8–46.1)
HGB BLD-MCNC: 12.3 G/DL (ref 11.5–15.4)
HGB UR QL STRIP.AUTO: ABNORMAL
IMM GRANULOCYTES # BLD AUTO: 0.02 THOUSAND/UL (ref 0–0.2)
IMM GRANULOCYTES NFR BLD AUTO: 0 % (ref 0–2)
KETONES UR STRIP-MCNC: NEGATIVE MG/DL
LEUKOCYTE ESTERASE UR QL STRIP: NEGATIVE
LIPASE SERPL-CCNC: 44 U/L (ref 11–82)
LYMPHOCYTES # BLD AUTO: 1.97 THOUSANDS/ΜL (ref 0.6–4.47)
LYMPHOCYTES NFR BLD AUTO: 25 % (ref 14–44)
MCH RBC QN AUTO: 26.7 PG (ref 26.8–34.3)
MCHC RBC AUTO-ENTMCNC: 31.3 G/DL (ref 31.4–37.4)
MCV RBC AUTO: 85 FL (ref 82–98)
MONOCYTES # BLD AUTO: 0.72 THOUSAND/ΜL (ref 0.17–1.22)
MONOCYTES NFR BLD AUTO: 9 % (ref 4–12)
NEUTROPHILS # BLD AUTO: 4.95 THOUSANDS/ΜL (ref 1.85–7.62)
NEUTS SEG NFR BLD AUTO: 64 % (ref 43–75)
NITRITE UR QL STRIP: NEGATIVE
NON-SQ EPI CELLS URNS QL MICRO: ABNORMAL /HPF
NRBC BLD AUTO-RTO: 0 /100 WBCS
PH UR STRIP.AUTO: 6.5 [PH]
PLATELET # BLD AUTO: 344 THOUSANDS/UL (ref 149–390)
PMV BLD AUTO: 9.3 FL (ref 8.9–12.7)
POTASSIUM SERPL-SCNC: 4.1 MMOL/L (ref 3.5–5.3)
PROT SERPL-MCNC: 7.1 G/DL (ref 6.4–8.4)
PROT UR STRIP-MCNC: NEGATIVE MG/DL
RBC # BLD AUTO: 4.6 MILLION/UL (ref 3.81–5.12)
RBC #/AREA URNS AUTO: ABNORMAL /HPF
SODIUM SERPL-SCNC: 134 MMOL/L (ref 135–147)
SP GR UR STRIP.AUTO: <=1.005
UROBILINOGEN UR QL STRIP.AUTO: 0.2 E.U./DL
WBC # BLD AUTO: 7.81 THOUSAND/UL (ref 4.31–10.16)
WBC #/AREA URNS AUTO: ABNORMAL /HPF

## 2023-05-04 RX ORDER — AMOXICILLIN AND CLAVULANATE POTASSIUM 875; 125 MG/1; MG/1
1 TABLET, FILM COATED ORAL EVERY 12 HOURS
Qty: 20 TABLET | Refills: 0 | Status: SHIPPED | OUTPATIENT
Start: 2023-05-04 | End: 2023-05-14

## 2023-05-04 RX ORDER — SPIRONOLACTONE 50 MG/1
TABLET, FILM COATED ORAL
COMMUNITY
Start: 2023-04-30

## 2023-05-04 RX ORDER — ONDANSETRON 4 MG/1
4 TABLET, ORALLY DISINTEGRATING ORAL EVERY 6 HOURS PRN
Qty: 20 TABLET | Refills: 0 | Status: SHIPPED | OUTPATIENT
Start: 2023-05-04

## 2023-05-04 RX ORDER — ONDANSETRON 2 MG/ML
4 INJECTION INTRAMUSCULAR; INTRAVENOUS ONCE
Status: COMPLETED | OUTPATIENT
Start: 2023-05-04 | End: 2023-05-04

## 2023-05-04 RX ADMIN — SODIUM CHLORIDE 1000 ML: 0.9 INJECTION, SOLUTION INTRAVENOUS at 09:40

## 2023-05-04 RX ADMIN — IOHEXOL 100 ML: 350 INJECTION, SOLUTION INTRAVENOUS at 10:36

## 2023-05-04 RX ADMIN — HYOSCYAMINE SULFATE 0.25 MG: 0.12 TABLET, ORALLY DISINTEGRATING ORAL at 09:44

## 2023-05-04 RX ADMIN — ONDANSETRON 4 MG: 2 INJECTION INTRAMUSCULAR; INTRAVENOUS at 09:44

## 2023-05-04 NOTE — ED PROVIDER NOTES
History  Chief Complaint   Patient presents with    Abdominal Pain     Was in [place], now having abdominal pain, nausea, diarrhea and vomiting  43-year-old female presenting to the ED for severe abdominal pain, nausea vomiting and diarrhea for 2 to 3 weeks since returning from [facility]  States she was seen at [facility] urgent care and had blood work urinalysis and ova and parasites stool testing which were all negative  Denies fevers, night sweats, chills, sore throat, cough, chest pain, shortness of breath, diarrhea, dysuria, hematuria  Patient states that she has been having intermittent episodes of frequent bowel movements that are soft but not watery and then constipation along with severe nausea with vomiting  Prior to Admission Medications   Prescriptions Last Dose Informant Patient Reported? Taking?   naproxen (Naprosyn) 500 mg tablet   No No   Sig: Take 1 tablet (500 mg total) by mouth 2 (two) times a day with meals   omeprazole (PriLOSEC) 20 mg delayed release capsule   Yes No   Sig: Take 20 mg by mouth daily   spironolactone (ALDACTONE) 50 mg tablet   Yes No      Facility-Administered Medications: None       Past Medical History:   Diagnosis Date    Acid reflux     COVID-19     GERD (gastroesophageal reflux disease)     Hyperlipidemia        Past Surgical History:   Procedure Laterality Date     SECTION      DILATION AND CURETTAGE OF UTERUS      2x    TONSILECTOMY AND ADNOIDECTOMY      TONSILLECTOMY      TUBAL LIGATION         Family History   Problem Relation Age of Onset    Heart disease Mother     No Known Problems Father      I have reviewed and agree with the history as documented      E-Cigarette/Vaping    E-Cigarette Use Never User      E-Cigarette/Vaping Substances    Nicotine No     THC No     CBD No     Flavoring No     Other No     Unknown No      Social History     Tobacco Use    Smoking status: Former     Packs/day: 0 25     Types: Cigarettes     Quit date: 2022     Years since quittin 7    Smokeless tobacco: Never   Vaping Use    Vaping Use: Never used   Substance Use Topics    Alcohol use: Yes     Alcohol/week: 2 0 standard drinks     Types: 2 Glasses of wine per week     Comment: weekends mostly    Drug use: Never       Review of Systems   Gastrointestinal: Positive for abdominal pain, diarrhea, nausea and vomiting  All other systems reviewed and are negative  Physical Exam  Physical Exam  Vitals and nursing note reviewed  Constitutional:       General: She is not in acute distress  Appearance: She is well-developed  She is not diaphoretic  HENT:      Head: Normocephalic and atraumatic  Right Ear: External ear normal       Left Ear: External ear normal       Nose: Nose normal    Eyes:      General: No scleral icterus  Right eye: No discharge  Left eye: No discharge  Conjunctiva/sclera: Conjunctivae normal       Pupils: Pupils are equal, round, and reactive to light  Neck:      Vascular: No JVD  Trachea: No tracheal deviation  Cardiovascular:      Rate and Rhythm: Normal rate and regular rhythm  Heart sounds: Normal heart sounds  No murmur heard  No friction rub  No gallop  Pulmonary:      Effort: Pulmonary effort is normal  No respiratory distress  Breath sounds: Normal breath sounds  No stridor  No wheezing or rales  Abdominal:      General: Bowel sounds are normal  There is no distension  Palpations: Abdomen is soft  There is no mass  Tenderness: There is generalized abdominal tenderness  There is no right CVA tenderness, left CVA tenderness or guarding  Musculoskeletal:         General: No tenderness or deformity  Normal range of motion  Cervical back: Normal range of motion and neck supple  Skin:     General: Skin is warm and dry  Coloration: Skin is not pale  Findings: No erythema or rash     Neurological:      Mental Status: She is alert and oriented to person, place, and time  Cranial Nerves: No cranial nerve deficit  Sensory: No sensory deficit  Motor: No abnormal muscle tone  Psychiatric:         Behavior: Behavior normal          Thought Content:  Thought content normal          Judgment: Judgment normal          Vital Signs  ED Triage Vitals [05/04/23 0913]   Temperature Pulse Respirations Blood Pressure SpO2   97 9 °F (36 6 °C) 85 16 133/63 97 %      Temp Source Heart Rate Source Patient Position - Orthostatic VS BP Location FiO2 (%)   Oral Monitor Sitting Left arm --      Pain Score       --           Vitals:    05/04/23 0913 05/04/23 1152   BP: 133/63 136/78   Pulse: 85 67   Patient Position - Orthostatic VS: Sitting Lying         Visual Acuity      ED Medications  Medications   ondansetron (ZOFRAN) injection 4 mg (4 mg Intravenous Given 5/4/23 0944)   hyoscyamine (LEVSIN/SL) SL tablet 0 25 mg (0 25 mg Sublingual Given 5/4/23 0944)   sodium chloride 0 9 % bolus 1,000 mL (0 mL Intravenous Stopped 5/4/23 1140)   iohexol (OMNIPAQUE) 350 MG/ML injection (SINGLE-DOSE) 100 mL (100 mL Intravenous Given 5/4/23 1036)       Diagnostic Studies  Results Reviewed     Procedure Component Value Units Date/Time    Urine Microscopic [[id number]]  (Abnormal) Collected: 05/04/23 1048    Lab Status: Final result Specimen: Urine, Clean Catch Updated: 05/04/23 1108     RBC, UA 0-1 /hpf      WBC, UA 2-4 /hpf      Epithelial Cells Moderate /hpf      Bacteria, UA None Seen /hpf     UA w Reflex to Microscopic w Reflex to Culture [[id number]]  (Abnormal) Collected: 05/04/23 1048    Lab Status: Final result Specimen: Urine, Clean Catch Updated: 05/04/23 1056     Color, UA Straw     Clarity, UA Clear     Specific Gravity, UA <=1 005     pH, UA 6 5     Leukocytes, UA Negative     Nitrite, UA Negative     Protein, UA Negative mg/dl      Glucose, UA Negative mg/dl      Ketones, UA Negative mg/dl      Urobilinogen, UA 0 2 E U /dl      Bilirubin, UA Negative     Occult Blood, UA Trace-Intact    hCG, qualitative pregnancy [[id number]]  (Normal) Collected: 05/04/23 0938    Lab Status: Final result Specimen: Blood from Arm, Right Updated: 05/04/23 1026     Preg, Serum Negative    CBC and differential [[id number]]  (Abnormal) Collected: 05/04/23 0938    Lab Status: Final result Specimen: Blood from Arm, Right Updated: 05/04/23 1021     WBC 7 81 Thousand/uL      RBC 4 60 Million/uL      Hemoglobin 12 3 g/dL      Hematocrit 39 3 %      MCV 85 fL      MCH 26 7 pg      MCHC 31 3 g/dL      RDW 14 5 %      MPV 9 3 fL      Platelets 929 Thousands/uL      nRBC 0 /100 WBCs      Neutrophils Relative 64 %      Immat GRANS % 0 %      Lymphocytes Relative 25 %      Monocytes Relative 9 %      Eosinophils Relative 1 %      Basophils Relative 1 %      Neutrophils Absolute 4 95 Thousands/µL      Immature Grans Absolute 0 02 Thousand/uL      Lymphocytes Absolute 1 97 Thousands/µL      Monocytes Absolute 0 72 Thousand/µL      Eosinophils Absolute 0 10 Thousand/µL      Basophils Absolute 0 05 Thousands/µL     Comprehensive metabolic panel [[id number]]  (Abnormal) Collected: 05/04/23 0938    Lab Status: Final result Specimen: Blood from Arm, Right Updated: 05/04/23 1021     Sodium 134 mmol/L      Potassium 4 1 mmol/L      Chloride 101 mmol/L      CO2 25 mmol/L      ANION GAP 8 mmol/L      BUN 15 mg/dL      Creatinine 0 77 mg/dL      Glucose 100 mg/dL      Calcium 9 7 mg/dL      AST 10 U/L      ALT 14 U/L      Alkaline Phosphatase 58 U/L      Total Protein 7 1 g/dL      Albumin 4 4 g/dL      Total Bilirubin 0 28 mg/dL      eGFR 98 ml/min/1 73sq m     Narrative:      [place] guidelines for Chronic Kidney Disease (CKD):     Stage 1 with normal or high GFR (GFR > 90 mL/min/1 73 square meters)    Stage 2 Mild CKD (GFR = 60-89 mL/min/1 73 square meters)    Stage 3A Moderate CKD (GFR = 45-59 mL/min/1 73 square meters)    Stage 3B Moderate CKD (GFR = 30-44 mL/min/1 73 square meters)    Stage 4 Severe CKD (GFR = 15-29 mL/min/1 73 square meters)    Stage 5 End Stage CKD (GFR <15 mL/min/1 73 square meters)  Note: GFR calculation is accurate only with a steady state creatinine    Lipase [[id number]]  (Normal) Collected: 05/04/23 0938    Lab Status: Final result Specimen: Blood from Arm, Right Updated: 05/04/23 1021     Lipase 44 u/L     Stool Enteric Bacterial Panel by PCR [[id number]]     Lab Status: No result Specimen: Stool     Clostridium difficile toxin by PCR with EIA [[id number]]     Lab Status: No result Specimen: Stool                  CT abdomen pelvis with contrast   Final Result by Wilbert Bhatti MD (05/04 1104)      No acute abdominopelvic pathology  Workstation performed: [id number]                    Procedures  Procedures         ED Course  ED Course as of 05/04/23 1450   Thu May 04, 2023   1111 Bacteria, UA: None Seen   1111 Epithelial Cells(!): Moderate   1148 Discussed all results with patient at bedside  Discussed antibiotics versus no antibiotics as patient has had symptoms for 3 weeks  Discussed with patient that I would prefer to at minimum get a stool sample prior to starting antibiotics  Patient states that she would prefer antibiotics at this time and she would make sure to collect stool prior to taking the antibiotics  Patient does have an appointment with GI in 1 week  Stressed the importance of keeping this appointment  SBIRT 22yo+    Flowsheet Row Most Recent Value   Initial Alcohol Screen: US AUDIT-C     1  How often do you have a drink containing alcohol? 2 Filed at: 05/04/2023 0925   2  How many drinks containing alcohol do you have on a typical day you are drinking? 6 Filed at: 05/04/2023 0925   3b  FEMALE Any Age, or MALE 65+: How often do you have 4 or more drinks on one occassion?  2 Filed at: 05/04/2023 0925   Audit-C Score 10 Filed at: 05/04/2023 4036   Full Alcohol Screen: US AUDIT 4  How often during the last year have you found that you were not able to stop drinking once you had started? 0 Filed at: 05/04/2023 0925   5  How often during past year have you failed to do what was normally expected of you because of drinking? 0 Filed at: 05/04/2023 0925   6  How often in past year have you needed a first drink in the morning to get yourself going after a heavy drinking session? 0 Filed at: 05/04/2023 0925   7  How often in past year have you had feeling of guilt or remorse after drinking? 0 Filed at: 05/04/2023 0925   8  How often in past year have you been unable to remember what happened night before because you had been drinking? 0 Filed at: 05/04/2023 0925   9  Have you or someone else been injured as a result of your drinking? 0 Filed at: 05/04/2023 0925   10  Has a relative, friend, doctor or other health worker been concerned about your drinking and suggested you cut down?  0 Filed at: 05/04/2023 1124   AUDIT Total Score 10 Filed at: 05/04/2023 3780   RANDY: How many times in the past year have you    Used an illegal drug or used a prescription medication for non-medical reasons? Never Filed at: 05/04/2023 4361                    Medical Decision Making  40-year-old female with 3 weeks of abdominal pain, nausea vomiting and diarrhea after visiting [facility]  CT scan along with blood work showing no acute processes  Long discussion with patient about antibiotics versus no antibiotics  Patient stating she would prefer antibiotics at this time as she is been having 3 weeks of symptoms  We will also provide Zofran  Patient has a GI appointment next week  Strict return precautions discussed and provided  Amount and/or Complexity of Data Reviewed  Labs: ordered  Decision-making details documented in ED Course  Radiology: ordered  Risk  Prescription drug management            Disposition  Final diagnoses:   Abdominal pain   Gastroenteritis     Time reflects when diagnosis was documented in both MDM as applicable and the Disposition within this note     Time User Action Codes Description Comment    5/4/2023 11:45 AM Boby Miryam Add [R10 9] Abdominal pain     5/4/2023 11:45 AM Boby Witt Add [K52 9] Gastroenteritis       ED Disposition     ED Disposition   Discharge    Condition   Stable    Date/Time   Thu May 4, 2023 11:47 AM    Comment   Abdelrahman Isaacs discharge to home/self care  Follow-up Information     Follow up With Specialties Details Why Contact Info Additional [address], [address] Internal Medicine, Nurse Practitioner   [address] [facility] [phone]       [facility] Emergency Medicine Go to  As needed, If symptoms worsen [address]  [place] [id number] [phone] [facility], [address], [address] [facility]          Discharge Medication List as of 5/4/2023 11:48 AM      START taking these medications    Details   amoxicillin-clavulanate (AUGMENTIN) 875-125 mg per tablet Take 1 tablet by mouth every 12 (twelve) hours for 10 days, Starting Thu 5/4/2023, Until Sun 5/14/2023, Normal      ondansetron (Zofran ODT) 4 mg disintegrating tablet Take 1 tablet (4 mg total) by mouth every 6 (six) hours as needed for nausea or vomiting, Starting Thu 5/4/2023, Normal         CONTINUE these medications which have NOT CHANGED    Details   naproxen (Naprosyn) 500 mg tablet Take 1 tablet (500 mg total) by mouth 2 (two) times a day with meals, Starting Wed 11/30/2022, Normal      omeprazole (PriLOSEC) 20 mg delayed release capsule Take 20 mg by mouth daily, Historical Med      spironolactone (ALDACTONE) 50 mg tablet Starting Sun 4/30/2023, Historical Med             Outpatient Discharge Orders   Stool Enteric Bacterial Panel by PCR   Standing Status: Future Standing Exp   Date: 05/04/24 Clostridium difficile toxin by PCR with EIA   Standing Status: Future Standing Exp   Date: 05/04/24       PDMP Review     None          ED Provider  Electronically Signed by           Von Florentino DO  05/04/23 8660

## 2023-05-04 NOTE — DISCHARGE INSTRUCTIONS
If you develop any new or worsening symptoms please immediately return to your nearest emergency department  Please make sure to follow-up with your GI physician at your regular scheduled appointment  Please try to give us a stool sample prior to starting antibiotics

## 2023-05-05 ENCOUNTER — APPOINTMENT (OUTPATIENT)
Dept: LAB | Facility: CLINIC | Age: 38
End: 2023-05-05

## 2023-05-05 DIAGNOSIS — K52.9 GASTROENTERITIS: ICD-10-CM

## 2023-05-05 DIAGNOSIS — R10.9 ABDOMINAL PAIN: ICD-10-CM

## 2023-05-06 LAB
C DIFF TOX B TCDB STL QL NAA+PROBE: NEGATIVE
CAMPYLOBACTER DNA SPEC NAA+PROBE: NORMAL
SALMONELLA DNA SPEC QL NAA+PROBE: NORMAL
SHIGA TOXIN STX GENE SPEC NAA+PROBE: NORMAL
SHIGELLA DNA SPEC QL NAA+PROBE: NORMAL

## 2023-05-10 ENCOUNTER — TELEPHONE (OUTPATIENT)
Dept: OBGYN CLINIC | Facility: HOSPITAL | Age: 38
End: 2023-05-10

## 2023-05-10 NOTE — TELEPHONE ENCOUNTER
Caller: Janey MAURO    Doctor: Fouzia Baird    Reason for call: Request 2/27 appt notes   Faxed [phone]    Call back#: [phone]

## 2023-05-25 ENCOUNTER — OFFICE VISIT (OUTPATIENT)
Dept: OBGYN CLINIC | Facility: CLINIC | Age: 38
End: 2023-05-25

## 2023-05-25 VITALS
DIASTOLIC BLOOD PRESSURE: 86 MMHG | HEART RATE: 79 BPM | HEIGHT: 63 IN | BODY MASS INDEX: 33.66 KG/M2 | SYSTOLIC BLOOD PRESSURE: 120 MMHG | WEIGHT: 190 LBS

## 2023-05-25 DIAGNOSIS — M75.32 CALCIFIC TENDINITIS OF LEFT SHOULDER: Primary | ICD-10-CM

## 2023-05-25 RX ORDER — BUPIVACAINE HYDROCHLORIDE 2.5 MG/ML
1 INJECTION, SOLUTION INFILTRATION; PERINEURAL
Status: COMPLETED | OUTPATIENT
Start: 2023-05-25 | End: 2023-05-25

## 2023-05-25 RX ORDER — BUPIVACAINE HYDROCHLORIDE 2.5 MG/ML
4 INJECTION, SOLUTION INFILTRATION; PERINEURAL
Status: COMPLETED | OUTPATIENT
Start: 2023-05-25 | End: 2023-05-25

## 2023-05-25 RX ORDER — TRIAMCINOLONE ACETONIDE 40 MG/ML
40 INJECTION, SUSPENSION INTRA-ARTICULAR; INTRAMUSCULAR
Status: COMPLETED | OUTPATIENT
Start: 2023-05-25 | End: 2023-05-25

## 2023-05-25 RX ADMIN — TRIAMCINOLONE ACETONIDE 40 MG: 40 INJECTION, SUSPENSION INTRA-ARTICULAR; INTRAMUSCULAR at 11:00

## 2023-05-25 RX ADMIN — BUPIVACAINE HYDROCHLORIDE 4 ML: 2.5 INJECTION, SOLUTION INFILTRATION; PERINEURAL at 11:00

## 2023-05-25 RX ADMIN — BUPIVACAINE HYDROCHLORIDE 1 ML: 2.5 INJECTION, SOLUTION INFILTRATION; PERINEURAL at 11:00

## 2023-05-25 NOTE — PROGRESS NOTES
"Assessment/Plan:  Assessment/Plan   Diagnoses and all orders for this visit:    Calcific tendinitis of left shoulder  -     Large joint arthrocentesis: L subacromial bursa      75-year-old right-hand-dominant female with left shoulder pain more than 9 months duration  Clinical impression is she has symptoms from calcific tendinopathy  Offered patient steroid injection to which she agreed  Administered mixture of 3 cc 0 25% bupivacaine and 1 cc canal to the left shoulder subacromial space without complication  She will follow-up as needed  Subjective:   Patient ID: Nazario Chaney is a 45 y o  female  Chief Complaint   Patient presents with   • Left Shoulder - Follow-up, Pain       75-year-old right-hand-dominant female following up for left shoulder pain more than 9 months duration  She was last seen by me 3 months ago at which point MRI review was noted for calcific tendinitis  She declined steroid injection and inquired regarding barbotage  She presents today for steroid injection  Reports having pain described mainly to the anterior lateral aspect of the arm, nonradiating, worse with elevating the arm, associated with limited range of motion, and improved with resting  Shoulder Pain  This is a new problem  The current episode started more than 1 month ago  The problem occurs constantly  The problem has been unchanged  Associated symptoms include arthralgias  Pertinent negatives include no joint swelling, numbness or weakness  Exacerbated by: Arm use  She has tried rest, position changes, NSAIDs and ice (Physical therapy, home exercise) for the symptoms  The treatment provided mild relief  Review of Systems   Musculoskeletal: Positive for arthralgias  Negative for joint swelling  Neurological: Negative for weakness and numbness         Objective:  Vitals:    05/25/23 1106   BP: 120/86   Pulse: 79   Weight: 86 2 kg (190 lb)   Height: 5' 3\" (1 6 m)     Ortho Exam    Physical " "Exam  Vitals and nursing note reviewed  Constitutional:       General: She is not in acute distress  Appearance: She is well-developed  She is not ill-appearing or diaphoretic  HENT:      Head: Normocephalic and atraumatic  Right Ear: External ear normal       Left Ear: External ear normal    Eyes:      Conjunctiva/sclera: Conjunctivae normal    Neck:      Trachea: No tracheal deviation  Cardiovascular:      Rate and Rhythm: Normal rate  Pulmonary:      Effort: Pulmonary effort is normal  No respiratory distress  Abdominal:      General: There is no distension  Skin:     General: Skin is warm and dry  Coloration: Skin is not jaundiced or pale  Neurological:      Mental Status: She is alert and oriented to person, place, and time  Psychiatric:         Mood and Affect: Mood normal          Behavior: Behavior normal          Thought Content: Thought content normal          Judgment: Judgment normal              Large joint arthrocentesis: L subacromial bursa  Universal Protocol:  Consent: Verbal consent obtained  Risks and benefits: risks, benefits and alternatives were discussed  Consent given by: patient  Time out: Immediately prior to procedure a \"time out\" was called to verify the correct patient, procedure, equipment, support staff and site/side marked as required  Patient understanding: patient states understanding of the procedure being performed  Patient consent: the patient's understanding of the procedure matches consent given  Procedure consent: procedure consent matches procedure scheduled  Relevant documents: relevant documents present and verified  Test results: test results available and properly labeled  Site marked: the operative site was marked  Radiology Images displayed and confirmed   If images not available, report reviewed: imaging studies available  Required items: required blood products, implants, devices, and special equipment available  Patient identity " "confirmed: verbally with patient    Supporting Documentation  Indications: pain   Procedure Details  Location: shoulder - L subacromial bursa  Preparation: Patient was prepped and draped in the usual sterile fashion  Needle gauge: 21G 2\"  Ultrasound guidance: no  Approach: lateral  Medications administered: 40 mg triamcinolone acetonide 40 mg/mL; 4 mL bupivacaine 0 25 %; 1 mL bupivacaine 0 25 %    Patient tolerance: patient tolerated the procedure well with no immediate complications  Dressing:  Sterile dressing applied            "

## 2023-07-18 ENCOUNTER — TELEPHONE (OUTPATIENT)
Dept: OTHER | Facility: OTHER | Age: 38
End: 2023-07-18

## 2023-07-19 ENCOUNTER — TELEPHONE (OUTPATIENT)
Dept: FAMILY MEDICINE CLINIC | Facility: CLINIC | Age: 38
End: 2023-07-19

## 2023-07-19 DIAGNOSIS — K21.9 GASTROESOPHAGEAL REFLUX DISEASE WITHOUT ESOPHAGITIS: Primary | ICD-10-CM

## 2023-07-19 RX ORDER — ESOMEPRAZOLE MAGNESIUM 40 MG/1
40 CAPSULE, DELAYED RELEASE ORAL
Qty: 90 CAPSULE | Refills: 1 | Status: SHIPPED | OUTPATIENT
Start: 2023-07-19

## 2023-09-26 ENCOUNTER — TELEPHONE (OUTPATIENT)
Age: 38
End: 2023-09-26

## 2023-09-26 NOTE — TELEPHONE ENCOUNTER
Caller: Patient     Doctor: Tyree Toussaint     Reason for call: Would like to discuss her next steps for her left shoulder, patient is unable to miss work and is hoping for a virtual visit    Call back#: [phone]

## 2023-10-07 ENCOUNTER — NURSE TRIAGE (OUTPATIENT)
Dept: OTHER | Facility: OTHER | Age: 38
End: 2023-10-07

## 2023-10-08 NOTE — TELEPHONE ENCOUNTER
Reason for Disposition  • [1] Difficulty breathing with exertion (e.g., walking) AND [2] new-onset or worsening    Answer Assessment - Initial Assessment Questions  1. ONSET: "When did the swelling start?" (e.g., minutes, hours, days)      Three days ago   2. LOCATION: "What part of the arm is swollen?"  "Are both arms swollen or just one arm?"    BUE up to elbow   3. SEVERITY: "How bad is the swelling?" (e.g., localized; mild, moderate, severe)    - LOCALIZED: Small area of puffiness or swelling on just one arm    - JOINT SWELLING: Swelling of one joint    - MILD: Puffiness or swelling of hand    - MODERATE: Puffiness or swollen feeling of entire arm     - SEVERE: All of arm looks swollen; pitting edema     Moderate   4. REDNESS: "Does the swelling look red or infected?"     Denies   5. PAIN: "Is the swelling painful to touch?" If Yes, ask: "How painful is it?"   (Scale 1-10; mild, moderate or severe)     Denies   6. FEVER: "Do you have a fever?" If Yes, ask: "What is it, how was it measured, and when did it start?"       Denies   7. CAUSE: "What do you think is causing the arm swelling?"    Airplane travel. 8. MEDICAL HISTORY: "Do you have a history of heart failure, kidney disease, liver failure, or cancer?"      Denies   9. RECURRENT SYMPTOM: "Have you had arm swelling before?" If Yes, ask: "When was the last time?" "What happened that time?"      Denies   10. OTHER SYMPTOMS: "Do you have any other symptoms?" (e.g., chest pain, difficulty breathing)     BLE edema up to knee. Tightness feeling. Mild SOB    11.  PREGNANCY: "Is there any chance you are pregnant?" "When was your last menstrual period?"       Denies    Protocols used: ARM SWELLING AND EDEMA-ADULT-

## 2023-10-08 NOTE — TELEPHONE ENCOUNTER
Regarding: medical concern  ----- Message from Raven Zaragoza sent at 10/7/2023 11:47 PM EDT -----  "My hands arm legs and feet are swollen, I have been flying and I'm not sure if it's because of that but I have a flight tomorrow and I wanted to know if it was a good idea to go"

## 2023-10-08 NOTE — TELEPHONE ENCOUNTER
C/o moderate BLE edema up to her knees, and BUE moderate edema up to her elbow, along with tightness of the limbs due to edema. Also reports SOB with exertion. No additional symptoms reported. Care advice given. Verbalized understanding. Declined with disposition. No further questions.

## 2023-10-09 ENCOUNTER — TELEPHONE (OUTPATIENT)
Dept: ADMINISTRATIVE | Facility: OTHER | Age: 38
End: 2023-10-09

## 2023-10-09 NOTE — TELEPHONE ENCOUNTER
----- Message from Selina Pinon sent at 10/9/2023  6:54 AM EDT -----  Regarding: Pap  10/09/23 6:54 AM    Hello, our patient Pancho Avila has had a pap smear  completed/performed at  [address] [address]    Phone: [phone]    Fax: [phone].  Please assist in updating the patient chart by [QUALITYOUTREACHLIST:04/06/2022    Thank you,  Selina Pinon  [facility] [phone]

## 2023-10-10 NOTE — TELEPHONE ENCOUNTER
Upon review of the In Basket request we were able to locate, review, and update the patient chart as requested for Pap Smear (HPV) aka Cervical Cancer Screening. Any additional questions or concerns should be emailed to the Practice Liaisons via the appropriate education email address, please do not reply via In Basket.     Thank you  Mary Fry

## 2023-10-21 ENCOUNTER — OFFICE VISIT (OUTPATIENT)
Dept: OBGYN CLINIC | Facility: CLINIC | Age: 38
End: 2023-10-21

## 2023-10-21 ENCOUNTER — APPOINTMENT (OUTPATIENT)
Dept: RADIOLOGY | Facility: CLINIC | Age: 38
End: 2023-10-21
Payer: COMMERCIAL

## 2023-10-21 VITALS
SYSTOLIC BLOOD PRESSURE: 123 MMHG | BODY MASS INDEX: 33.7 KG/M2 | WEIGHT: 190.2 LBS | DIASTOLIC BLOOD PRESSURE: 85 MMHG | HEART RATE: 73 BPM | HEIGHT: 63 IN

## 2023-10-21 DIAGNOSIS — M25.512 LEFT SHOULDER PAIN, UNSPECIFIED CHRONICITY: ICD-10-CM

## 2023-10-21 DIAGNOSIS — M75.32 CALCIFIC TENDINITIS OF LEFT SHOULDER: Primary | ICD-10-CM

## 2023-10-21 DIAGNOSIS — F41.9 ANXIETY: ICD-10-CM

## 2023-10-21 PROCEDURE — 73030 X-RAY EXAM OF SHOULDER: CPT

## 2023-10-21 RX ORDER — OMEPRAZOLE 40 MG/1
40 CAPSULE, DELAYED RELEASE ORAL DAILY
COMMUNITY

## 2023-10-21 RX ORDER — ALPRAZOLAM 0.5 MG/1
0.5 TABLET ORAL
Qty: 2 TABLET | Refills: 0 | Status: SHIPPED | OUTPATIENT
Start: 2023-10-21

## 2023-10-21 RX ORDER — KETOROLAC TROMETHAMINE 10 MG/1
10 TABLET, FILM COATED ORAL 2 TIMES DAILY PRN
Qty: 20 TABLET | Refills: 0 | Status: SHIPPED | OUTPATIENT
Start: 2023-10-21

## 2023-10-21 NOTE — PROGRESS NOTES
Assessment/Plan:  Assessment/Plan   Diagnoses and all orders for this visit:    Calcific tendinitis of left shoulder  -     XR shoulder 2+ vw left; Future  -     US msk guidance; Future  -     ketorolac (TORADOL) 10 mg tablet; Take 1 tablet (10 mg total) by mouth 2 (two) times a day as needed for moderate pain    Anxiety  -     ALPRAZolam (XANAX) 0.5 mg tablet; Take 1 tablet (0.5 mg total) by mouth 30 min pre-procedure Take 2nd dose 30 mins after 1st dose if still anxious. Other orders  -     omeprazole (PriLOSEC) 40 MG capsule; Take 40 mg by mouth daily      19-year-old right-hand-dominant female with left shoulder pain of more than 1 year duration. Discussed with patient physical exam, radiographs, impression, and plan. X-rays noted for calcific tendinitis, increased compared to previous x-ray. I advised patient that since she experienced significant temporary improvement of symptoms following steroid injection it is likely that symptoms are due to pathology within the shoulder. Symptoms have been worsening despite steroid injection and formal therapy and NSAIDs. I will refer for ultrasound-guided lavage of left consul tendinitis. She will follow-up with me 2 weeks after procedure at which point she will be reevaluated. Subjective:   Patient ID: Domenic Snellen is a 45 y.o. female. Chief Complaint   Patient presents with    Left Shoulder - Follow-up        19-year-old right-hand-dominant female following up for left shoulder pain of more than 1 year duration. She was last seen by me 5 months ago at which point she was given steroid injection to left shoulder to address calcific tendinitis. She reports that following steroid injection she has significant improvement in symptoms for about 1 month. In the time she felt about 90% improvement. Pain started to gradually worsen.   Pain described as generalized to the shoulder, nonradiating, burning and sharp, fluctuating intensity and sometimes severe, worse with activity particularly lifting and carrying, associated with limited range of motion, and improved with resting. She sometimes takes ibuprofen for symptoms. Shoulder Pain  This is a chronic problem. The current episode started more than 1 year ago. The problem occurs constantly. The problem has been gradually worsening. Associated symptoms include arthralgias. Pertinent negatives include no joint swelling, numbness or weakness. Exacerbated by: Arm movement. She has tried rest, position changes and NSAIDs (Steroid injection, physical therapy, home exercise) for the symptoms. The treatment provided mild relief. Review of Systems   Musculoskeletal:  Positive for arthralgias. Negative for joint swelling. Neurological:  Negative for weakness and numbness. Objective:  Vitals:    10/21/23 1109   BP: 123/85   Pulse: 73   Weight: 86.3 kg (190 lb 3.2 oz)   Height: 5' 3" (1.6 m)      Ortho Exam    Physical Exam  Vitals and nursing note reviewed. Constitutional:       Appearance: Normal appearance. She is well-developed. She is not ill-appearing or diaphoretic. HENT:      Head: Normocephalic and atraumatic. Right Ear: External ear normal.      Left Ear: External ear normal.   Eyes:      Conjunctiva/sclera: Conjunctivae normal.   Neck:      Trachea: No tracheal deviation. Cardiovascular:      Rate and Rhythm: Normal rate. Pulmonary:      Effort: Pulmonary effort is normal. No respiratory distress. Abdominal:      General: There is no distension. Skin:     General: Skin is warm and dry. Coloration: Skin is not jaundiced or pale. Neurological:      Mental Status: She is alert and oriented to person, place, and time. Psychiatric:         Mood and Affect: Mood normal.         Behavior: Behavior normal.         Thought Content:  Thought content normal.         Judgment: Judgment normal.         I have personally reviewed pertinent films in PACS and my interpretation is .  X-rays noted for calcific tendinitis, increased compared to previous x-ray

## 2023-10-31 ENCOUNTER — TELEPHONE (OUTPATIENT)
Age: 38
End: 2023-10-31

## 2023-10-31 NOTE — TELEPHONE ENCOUNTER
Called and spoke with Pt. Pt last seen OV 10/21/23 with calcific tendonitis L shoulder. Pt will go to  water bottle and she is having difficulty picking up objects, and pain from elbow and into hand. Pt is currently taking the medication Dr Jaspal Blake prescribed (ketorolac) but it is not helping. Pt symptoms started with in the last few days/week. Pt pain is a 8/10, pain is sharp and lateral left arm from shoulder to pink finger. Pain is constantly getting stronger since steroid injection. No further symptoms at this time but the pain. I did look for appts for the Pt incase Dr Jaspal Blake would like her to come in for an evaluation but nothing is available until Later in November. If Dr Jaspal Blake would like to evaluate the Pt can the office please assist with scheduling. Further advised the pt to rest and take OTC tylenol to help with pain, 2 ES tylenol every 8 hours and do not exceed 3,000 mg.      Thank you

## 2023-10-31 NOTE — TELEPHONE ENCOUNTER
Caller: Patient    Doctor: Jaspal Blake    Reason for call: Patient is calling stating she is having a new pain where now it goes into her elbow and her hand where she cannot close her hand for a few minutes. Her [facility] US isn't until January, she should like to know what she can do for this because this has never happened before.     Call back#: [phone] or [phone] (work phone)

## 2023-11-01 DIAGNOSIS — M75.32 CALCIFIC TENDINITIS OF LEFT SHOULDER: Primary | ICD-10-CM

## 2023-11-01 RX ORDER — PREDNISONE 20 MG/1
20 TABLET ORAL 2 TIMES DAILY WITH MEALS
Qty: 10 TABLET | Refills: 0 | Status: SHIPPED | OUTPATIENT
Start: 2023-11-01 | End: 2023-11-06

## 2023-11-02 NOTE — TELEPHONE ENCOUNTER
Called and spoke with pt and relayed Dr Odell Garcia. Pt would like to know what Dr Pato Azevedo thinks is contributing to her pain, is it something with her nerve? PT states the sensation is like when you hit your funny bone.

## 2023-12-22 DIAGNOSIS — M75.32 CALCIFIC TENDINITIS OF LEFT SHOULDER: Primary | ICD-10-CM

## 2023-12-22 RX ORDER — IBUPROFEN 800 MG/1
800 TABLET ORAL 3 TIMES DAILY PRN
Qty: 90 TABLET | Refills: 0 | Status: SHIPPED | OUTPATIENT
Start: 2023-12-22

## 2023-12-28 ENCOUNTER — OFFICE VISIT (OUTPATIENT)
Dept: OBGYN CLINIC | Facility: CLINIC | Age: 38
End: 2023-12-28
Payer: COMMERCIAL

## 2023-12-28 ENCOUNTER — TELEPHONE (OUTPATIENT)
Dept: RADIOLOGY | Facility: HOSPITAL | Age: 38
End: 2023-12-28

## 2023-12-28 VITALS
RESPIRATION RATE: 14 BRPM | OXYGEN SATURATION: 97 % | SYSTOLIC BLOOD PRESSURE: 122 MMHG | HEIGHT: 63 IN | BODY MASS INDEX: 33.35 KG/M2 | WEIGHT: 188.2 LBS | DIASTOLIC BLOOD PRESSURE: 80 MMHG | HEART RATE: 104 BPM

## 2023-12-28 DIAGNOSIS — M75.32 CALCIFIC TENDINITIS OF LEFT SHOULDER: Primary | ICD-10-CM

## 2023-12-28 PROCEDURE — 99214 OFFICE O/P EST MOD 30 MIN: CPT | Performed by: FAMILY MEDICINE

## 2023-12-28 RX ORDER — NAPROXEN 500 MG/1
500 TABLET ORAL 2 TIMES DAILY WITH MEALS
Qty: 60 TABLET | Refills: 0 | Status: SHIPPED | OUTPATIENT
Start: 2023-12-28

## 2023-12-28 RX ORDER — METHYLPREDNISOLONE 4 MG/1
TABLET ORAL
Qty: 1 EACH | Refills: 0 | Status: SHIPPED | OUTPATIENT
Start: 2023-12-28

## 2023-12-28 NOTE — NURSING NOTE
Call placed to pt to discuss upcoming appointment at [facility] Radiology Department and consultation completed.  Pt is having a L Shoulder Calcific Tendinitis Lavage completed on 1/3/2024.  Allergies reviewed and verified pt does not currently take any anticoagulant medications.  Pre procedure instructions including diet and taking own medications discussed with pt.  Pt instructed that she may eat normally and take medications as usual before the procedure.  Pt verbalized understanding of instructions given.  Reminded pt of the location, date and time of procedure.  My number was given to call if any questions or concerns arise pre or post procedure.

## 2023-12-28 NOTE — PROGRESS NOTES
"     Subjective:  Chief Complaint   Patient presents with    Left Shoulder - Pain       Cristal Devine is a 38 y.o. female is presenting today for follow-up visit in regards to left shoulder pain.  This is the first evaluation for patient however patient has been following with Dr. Ramierz for ongoing calcific tendinitis of left shoulder.  She has been treated in the past with corticosteroid injection and physical therapy along with oral anti-inflammatory medications.  She states that the corticosteroid injection did provide her relief however lasted only about 1 month.  She is scheduled to undergo calcific lavage next week.  She states that her pain symptoms over the past several weeks have worsened along the anterior lateral aspect of the shoulder.  She has been experiencing numbness and tingling down the arm which was treated in the past with prednisone .  She denies any neck pain or weakness in the arm      The following portions of the patient's history were reviewed and updated as appropriate: allergies, current medications, past family history, past medical history, past social history, past surgical history and problem list.    Occupation:           Objective:  /80 (BP Location: Right arm, Patient Position: Sitting)   Pulse 104   Resp 14   Ht 5' 3\" (1.6 m)   Wt 85.4 kg (188 lb 3.2 oz)   SpO2 97%   BMI 33.34 kg/m²     Skin: no rashes, lesions, skin discolorations, lacerations  Vasculature: normal radial and ulnar pulse,  normal skin color, normal capillary refill in extremity, no upper extremity edema  Neurologic: Neurologic exam is normal throughout upper extremities, Awake, alert, and oriented x3, no apparent distress.   Musculoskeletal:   left SHOULDER EXAM  Her examination is limited by pain however there is no gross deformity or signs of infection    Range of motion limited to about 50 degrees forward flexion, 40 degrees external rotation, and lower lumbar internal rotation    Tenderness " palpation over the shoulder    Positive Best and Neer's              Imaging:    No results found.     Assessment/Plan:      1. Calcific tendinitis of left shoulder  Greater than 45 minutes was spent reviewing patient's past medical history and management imaging and workup to date.  My clinical impression is that patient is suffering from calcific tendinitis of the left shoulder with associated nerve irritation which seems to be causing radicular symptoms in the left upper extremity.  She does not have any weakness on motor evaluation and has a negative Spurling's maneuver.  I encouraged patient to follow-up on lavage procedure scheduled for next week which I believe may help alleviate pain symptoms.  To help assist patient's with pain symptoms until she is able to receive procedure I advised to begin naproxen 500 mg twice daily with food and to take the medication as scheduled.  Additionally I will provide her with a Medrol Dosepak to help alleviate neuropathic symptoms.  Return precautions were provided.  Additionally Toradol injection was offered however patient declines    - naproxen (Naprosyn) 500 mg tablet; Take 1 tablet (500 mg total) by mouth 2 (two) times a day with meals  Dispense: 60 tablet; Refill: 0  - methylPREDNISolone 4 MG tablet therapy pack; Use as directed on package  Dispense: 1 each; Refill: 0

## 2024-01-03 ENCOUNTER — HOSPITAL ENCOUNTER (OUTPATIENT)
Dept: ULTRASOUND IMAGING | Facility: HOSPITAL | Age: 39
Discharge: HOME | End: 2024-01-03
Attending: FAMILY MEDICINE
Payer: COMMERCIAL

## 2024-01-03 DIAGNOSIS — M75.32 CALCIFIC TENDINITIS OF LEFT SHOULDER: ICD-10-CM

## 2024-01-03 PROCEDURE — 20611 DRAIN/INJ JOINT/BURSA W/US: CPT

## 2024-01-03 RX ORDER — BUPIVACAINE HYDROCHLORIDE 2.5 MG/ML
5 INJECTION, SOLUTION EPIDURAL; INFILTRATION; INTRACAUDAL ONCE
Status: DISCONTINUED | OUTPATIENT
Start: 2024-01-03 | End: 2024-01-04 | Stop reason: HOSPADM

## 2024-01-03 RX ORDER — METHYLPREDNISOLONE ACETATE 80 MG/ML
80 INJECTION, SUSPENSION INTRA-ARTICULAR; INTRALESIONAL; INTRAMUSCULAR; SOFT TISSUE ONCE
Qty: 1 ML | Refills: 0 | Status: DISCONTINUED | OUTPATIENT
Start: 2024-01-03 | End: 2024-01-04 | Stop reason: HOSPADM

## 2024-01-03 RX ORDER — LIDOCAINE HYDROCHLORIDE 10 MG/ML
30 INJECTION, SOLUTION EPIDURAL; INFILTRATION; INTRACAUDAL; PERINEURAL ONCE
Status: DISCONTINUED | OUTPATIENT
Start: 2024-01-03 | End: 2024-01-04 | Stop reason: HOSPADM

## 2024-01-20 ENCOUNTER — OFFICE VISIT (OUTPATIENT)
Dept: OBGYN CLINIC | Facility: CLINIC | Age: 39
End: 2024-01-20
Payer: COMMERCIAL

## 2024-01-20 VITALS
DIASTOLIC BLOOD PRESSURE: 87 MMHG | BODY MASS INDEX: 33.66 KG/M2 | SYSTOLIC BLOOD PRESSURE: 124 MMHG | WEIGHT: 190 LBS | HEART RATE: 80 BPM | HEIGHT: 63 IN

## 2024-01-20 DIAGNOSIS — M67.814 TENDINOSIS OF LEFT SHOULDER: ICD-10-CM

## 2024-01-20 DIAGNOSIS — M75.42 SUBACROMIAL IMPINGEMENT OF LEFT SHOULDER: ICD-10-CM

## 2024-01-20 DIAGNOSIS — M75.32 CALCIFIC TENDINITIS OF LEFT SHOULDER: Primary | ICD-10-CM

## 2024-01-20 PROCEDURE — 99213 OFFICE O/P EST LOW 20 MIN: CPT | Performed by: FAMILY MEDICINE

## 2024-01-20 NOTE — PROGRESS NOTES
Assessment/Plan:  Assessment/Plan   Diagnoses and all orders for this visit:    Calcific tendinitis of left shoulder    Subacromial impingement of left shoulder    Tendinosis of left shoulder      39-year-old right-hand-dominant female with left shoulder pain of more than 15 months duration.  Discussed with patient physical exam, impression, and plan.  Left shoulder has range of motion forward flexion to 130 degrees, abduction 100 degrees, and internal rotation to the lumbar spine.  Clinical impression is that she is not improved regard to inflammatory component of her symptoms.  Advised patient that scar tissue and altered mechanics contribute to impingement.  She is to continue home exercise program instructed by formal therapy.  She was advised if symptoms persist she may consider PRP injection.  She will follow-up with me as needed.      Subjective:   Patient ID: Cristal Devine is a 39 y.o. female.  Chief Complaint   Patient presents with    Left Shoulder - Follow-up        39-year-old right-hand-dominant female following up for left shoulder pain more than 15 months duration.  She was last seen by me 3 months ago at which point she was referred for ultrasound-guided lavage for calcific tendinitis.  She was seen by Dr. Pearce 3 weeks ago at which point she was prescribed naproxen 500 mg twice daily and given course of methylprednisone Dosepak.  She underwent ultrasound-guided lavage 1/3/2024.  She reports feeling much better since undergoing procedure.  She reports having pain described localized mainly to the anterior lateral aspect the shoulder, nonradiating, worse with elevating above shoulder level and reaching across her chest, associated with limited range of motion, and improved with resting.  For most part pain is much better so she has not been taking any medication recently.    Shoulder Pain  This is a chronic problem. The current episode started more than 1 year ago. The problem occurs intermittently. The  "problem has been rapidly improving. Associated symptoms include arthralgias. Pertinent negatives include no joint swelling, numbness or weakness. Exacerbated by: Arm elevation. She has tried rest, position changes and NSAIDs (Ultrasound-guided lavage) for the symptoms. The treatment provided significant relief.               Review of Systems   Musculoskeletal:  Positive for arthralgias. Negative for joint swelling.   Neurological:  Negative for weakness and numbness.       Objective:  Vitals:    01/20/24 1053   BP: 124/87   Pulse: 80   Weight: 86.2 kg (190 lb)   Height: 5' 3\" (1.6 m)      Left Shoulder Exam     Range of Motion   Active abduction:  100   Forward flexion:  130   Internal rotation 0 degrees:  Lumbar     Muscle Strength   Abduction: 5/5   Internal rotation: 5/5   External rotation: 5/5   Supraspinatus: 5/5              Physical Exam  Vitals and nursing note reviewed.   Constitutional:       Appearance: Normal appearance. She is well-developed. She is not ill-appearing or diaphoretic.   HENT:      Head: Normocephalic and atraumatic.      Right Ear: External ear normal.      Left Ear: External ear normal.   Eyes:      Conjunctiva/sclera: Conjunctivae normal.   Neck:      Trachea: No tracheal deviation.   Cardiovascular:      Rate and Rhythm: Normal rate.   Pulmonary:      Effort: Pulmonary effort is normal. No respiratory distress.   Abdominal:      General: There is no distension.   Skin:     General: Skin is warm and dry.      Coloration: Skin is not jaundiced or pale.   Neurological:      Mental Status: She is alert and oriented to person, place, and time.   Psychiatric:         Mood and Affect: Mood normal.         Behavior: Behavior normal.         Thought Content: Thought content normal.         Judgment: Judgment normal.                   "

## 2024-01-20 NOTE — PATIENT INSTRUCTIONS
Consider in the future PRP injection (Platelet Rich Plasma)  - insurance does not cover - [facility] $250- $300  - if interested may see Dr. Pearce

## 2024-03-04 ENCOUNTER — OFFICE VISIT (OUTPATIENT)
Dept: OBGYN CLINIC | Facility: CLINIC | Age: 39
End: 2024-03-04

## 2024-03-04 VITALS
BODY MASS INDEX: 34.55 KG/M2 | HEIGHT: 63 IN | DIASTOLIC BLOOD PRESSURE: 84 MMHG | SYSTOLIC BLOOD PRESSURE: 130 MMHG | HEART RATE: 80 BPM | WEIGHT: 195 LBS

## 2024-03-04 DIAGNOSIS — M75.42 SUBACROMIAL IMPINGEMENT OF LEFT SHOULDER: ICD-10-CM

## 2024-03-04 DIAGNOSIS — M67.814 TENDINOSIS OF LEFT SHOULDER: ICD-10-CM

## 2024-03-04 DIAGNOSIS — M75.32 CALCIFIC TENDINITIS OF LEFT SHOULDER: Primary | ICD-10-CM

## 2024-03-04 PROCEDURE — 99213 OFFICE O/P EST LOW 20 MIN: CPT | Performed by: FAMILY MEDICINE

## 2024-03-04 RX ORDER — NAPROXEN 500 MG/1
500 TABLET ORAL 2 TIMES DAILY WITH MEALS
Qty: 60 TABLET | Refills: 0 | Status: SHIPPED | OUTPATIENT
Start: 2024-03-04

## 2024-03-04 NOTE — PROGRESS NOTES
Assessment/Plan:  Assessment/Plan   Diagnoses and all orders for this visit:    Calcific tendinitis of left shoulder  -     naproxen (Naprosyn) 500 mg tablet; Take 1 tablet (500 mg total) by mouth 2 (two) times a day with meals  -     Ambulatory Referral to Physical Therapy; Future    Subacromial impingement of left shoulder  -     Ambulatory Referral to Physical Therapy; Future    Tendinosis of left shoulder  -     Ambulatory Referral to Physical Therapy; Future        39-year-old right-hand-dominant female with left shoulder pain of more than 16 months duration.  Discussed the patient physical exam, impression, and plan.  Left shoulder has range of motion forward flexion to 100 degrees, abduction 90 degrees, and internal rotation to the sacrum.  Clinical impression is that she has symptoms from tendinopathy of the rotator cuff.  I discussed with patient that MRI was unremarkable for rotator cuff tear so she is likely having symptoms from residual tendinopathy.  Recommend naproxen 500 mg twice daily with food for 2 weeks.  She may alternate between ice and heat and recommend she continue home exercise program instructed by formal therapy.  Will hold off on injections at this time as it is too soon to repeat steroid injection.  She will follow-up as needed.        Subjective:   Patient ID: Cristal Devine is a 39 y.o. female.  Chief Complaint   Patient presents with    Left Shoulder - Follow-up        39-year-old right-hand-dominant female following up for left shoulder pain more than 16 months duration.  She was last seen by me 6 weeks ago at which point she was status post ultrasound lavage and she reported feeling much better.  She states about 1 week ago symptoms returned and have been quite bothersome.  She has pain described as generalized to the shoulder, worse with moving the arm particular elevating, associated with limited range of motion, and improved resting.  She has been taking ibuprofen to help with  "symptoms.      Shoulder Pain  This is a chronic problem. The current episode started more than 1 year ago. The problem occurs daily. The problem has been gradually worsening. Associated symptoms include arthralgias. Pertinent negatives include no joint swelling, numbness or weakness. Exacerbated by: Arm movement. She has tried rest, position changes and NSAIDs for the symptoms. The treatment provided mild relief.               Review of Systems   Musculoskeletal:  Positive for arthralgias. Negative for joint swelling.   Neurological:  Negative for weakness and numbness.       Objective:  Vitals:    03/04/24 0807   BP: 130/84   Pulse: 80   Weight: 88.5 kg (195 lb)   Height: 5' 3\" (1.6 m)      Left Shoulder Exam     Range of Motion   Active abduction:  90   Forward flexion:  100   Internal rotation 0 degrees:  Sacrum              Physical Exam  Vitals and nursing note reviewed.   Constitutional:       Appearance: Normal appearance. She is well-developed. She is not ill-appearing or diaphoretic.   HENT:      Head: Normocephalic and atraumatic.      Right Ear: External ear normal.      Left Ear: External ear normal.   Eyes:      Conjunctiva/sclera: Conjunctivae normal.   Neck:      Trachea: No tracheal deviation.   Cardiovascular:      Rate and Rhythm: Normal rate.   Pulmonary:      Effort: Pulmonary effort is normal. No respiratory distress.   Abdominal:      General: There is no distension.   Skin:     General: Skin is warm and dry.      Coloration: Skin is not jaundiced or pale.   Neurological:      Mental Status: She is alert and oriented to person, place, and time.   Psychiatric:         Mood and Affect: Mood normal.         Behavior: Behavior normal.         Thought Content: Thought content normal.         Judgment: Judgment normal.                   "

## 2024-03-04 NOTE — PATIENT INSTRUCTIONS
Rx: Naproxen 500 mg  - twice daily  - eat first  - 2 weeks  - Tylenol as needed  - Ibuprofen    Alternate between heat and ice

## 2024-05-22 ENCOUNTER — HOSPITAL ENCOUNTER (EMERGENCY)
Facility: HOSPITAL | Age: 39
Discharge: HOME | End: 2024-05-22
Attending: EMERGENCY MEDICINE | Admitting: EMERGENCY MEDICINE
Payer: COMMERCIAL

## 2024-05-22 ENCOUNTER — APPOINTMENT (EMERGENCY)
Dept: CT IMAGING | Facility: HOSPITAL | Age: 39
End: 2024-05-22
Payer: COMMERCIAL

## 2024-05-22 ENCOUNTER — APPOINTMENT (EMERGENCY)
Dept: ULTRASOUND IMAGING | Facility: HOSPITAL | Age: 39
End: 2024-05-22
Payer: COMMERCIAL

## 2024-05-22 VITALS
RESPIRATION RATE: 18 BRPM | HEIGHT: 63 IN | SYSTOLIC BLOOD PRESSURE: 149 MMHG | BODY MASS INDEX: 32.78 KG/M2 | TEMPERATURE: 98.5 F | WEIGHT: 185 LBS | DIASTOLIC BLOOD PRESSURE: 83 MMHG | HEART RATE: 84 BPM | OXYGEN SATURATION: 99 %

## 2024-05-22 DIAGNOSIS — R10.9 ABDOMINAL PAIN: Primary | ICD-10-CM

## 2024-05-22 LAB
ALBUMIN SERPL BCP-MCNC: 4.3 G/DL (ref 3.5–5)
ALP SERPL-CCNC: 49 U/L (ref 34–104)
ALT SERPL W P-5'-P-CCNC: 21 U/L (ref 7–52)
ANION GAP SERPL CALCULATED.3IONS-SCNC: 8 MMOL/L (ref 4–13)
AST SERPL W P-5'-P-CCNC: 14 U/L (ref 13–39)
BACTERIA UR QL AUTO: NORMAL /HPF
BASOPHILS # BLD AUTO: 0.05 THOUSANDS/ÂΜL (ref 0–0.1)
BASOPHILS NFR BLD AUTO: 1 % (ref 0–1)
BILIRUB SERPL-MCNC: 0.22 MG/DL (ref 0.2–1)
BILIRUB UR QL STRIP: NEGATIVE
BUN SERPL-MCNC: 16 MG/DL (ref 5–25)
CALCIUM SERPL-MCNC: 9.5 MG/DL (ref 8.4–10.2)
CHLORIDE SERPL-SCNC: 103 MMOL/L (ref 96–108)
CLARITY UR: CLEAR
CO2 SERPL-SCNC: 25 MMOL/L (ref 21–32)
COLOR UR: ABNORMAL
CREAT SERPL-MCNC: 0.78 MG/DL (ref 0.6–1.3)
EOSINOPHIL # BLD AUTO: 0.16 THOUSAND/ÂΜL (ref 0–0.61)
EOSINOPHIL NFR BLD AUTO: 2 % (ref 0–6)
ERYTHROCYTE [DISTWIDTH] IN BLOOD BY AUTOMATED COUNT: 14.8 % (ref 11.6–15.1)
EXT PREGNANCY TEST URINE: NEGATIVE
EXT. CONTROL: NORMAL
GFR SERPL CREATININE-BSD FRML MDRD: 96 ML/MIN/1.73SQ M
GLUCOSE SERPL-MCNC: 89 MG/DL (ref 65–140)
GLUCOSE UR STRIP-MCNC: NEGATIVE MG/DL
HCT VFR BLD AUTO: 36.3 % (ref 34.8–46.1)
HGB BLD-MCNC: 11.2 G/DL (ref 11.5–15.4)
HGB UR QL STRIP.AUTO: ABNORMAL
IMM GRANULOCYTES # BLD AUTO: 0.03 THOUSAND/UL (ref 0–0.2)
IMM GRANULOCYTES NFR BLD AUTO: 0 % (ref 0–2)
KETONES UR STRIP-MCNC: NEGATIVE MG/DL
LEUKOCYTE ESTERASE UR QL STRIP: NEGATIVE
LIPASE SERPL-CCNC: 39 U/L (ref 11–82)
LYMPHOCYTES # BLD AUTO: 2.85 THOUSANDS/ÂΜL (ref 0.6–4.47)
LYMPHOCYTES NFR BLD AUTO: 30 % (ref 14–44)
MCH RBC QN AUTO: 25.9 PG (ref 26.8–34.3)
MCHC RBC AUTO-ENTMCNC: 30.9 G/DL (ref 31.4–37.4)
MCV RBC AUTO: 84 FL (ref 82–98)
MONOCYTES # BLD AUTO: 1.06 THOUSAND/ÂΜL (ref 0.17–1.22)
MONOCYTES NFR BLD AUTO: 11 % (ref 4–12)
NEUTROPHILS # BLD AUTO: 5.32 THOUSANDS/ÂΜL (ref 1.85–7.62)
NEUTS SEG NFR BLD AUTO: 56 % (ref 43–75)
NITRITE UR QL STRIP: NEGATIVE
NON-SQ EPI CELLS URNS QL MICRO: NORMAL /HPF
NRBC BLD AUTO-RTO: 0 /100 WBCS
PH UR STRIP.AUTO: 6.5 [PH]
PLATELET # BLD AUTO: 377 THOUSANDS/UL (ref 149–390)
PMV BLD AUTO: 9.5 FL (ref 8.9–12.7)
POTASSIUM SERPL-SCNC: 4.2 MMOL/L (ref 3.5–5.3)
PROT SERPL-MCNC: 7.3 G/DL (ref 6.4–8.4)
PROT UR STRIP-MCNC: NEGATIVE MG/DL
RBC # BLD AUTO: 4.33 MILLION/UL (ref 3.81–5.12)
RBC #/AREA URNS AUTO: NORMAL /HPF
SODIUM SERPL-SCNC: 136 MMOL/L (ref 135–147)
SP GR UR STRIP.AUTO: 1.01
UROBILINOGEN UR QL STRIP.AUTO: 0.2 E.U./DL
WBC # BLD AUTO: 9.47 THOUSAND/UL (ref 4.31–10.16)
WBC #/AREA URNS AUTO: NORMAL /HPF

## 2024-05-22 PROCEDURE — 83690 ASSAY OF LIPASE: CPT

## 2024-05-22 PROCEDURE — 99285 EMERGENCY DEPT VISIT HI MDM: CPT

## 2024-05-22 PROCEDURE — 76856 US EXAM PELVIC COMPLETE: CPT

## 2024-05-22 PROCEDURE — 96374 THER/PROPH/DIAG INJ IV PUSH: CPT

## 2024-05-22 PROCEDURE — 74177 CT ABD & PELVIS W/CONTRAST: CPT

## 2024-05-22 PROCEDURE — 81025 URINE PREGNANCY TEST: CPT

## 2024-05-22 PROCEDURE — 81001 URINALYSIS AUTO W/SCOPE: CPT

## 2024-05-22 PROCEDURE — 99284 EMERGENCY DEPT VISIT MOD MDM: CPT

## 2024-05-22 PROCEDURE — 80053 COMPREHEN METABOLIC PANEL: CPT

## 2024-05-22 PROCEDURE — 85025 COMPLETE CBC W/AUTO DIFF WBC: CPT

## 2024-05-22 PROCEDURE — 96361 HYDRATE IV INFUSION ADD-ON: CPT

## 2024-05-22 PROCEDURE — 36415 COLL VENOUS BLD VENIPUNCTURE: CPT

## 2024-05-22 PROCEDURE — 76830 TRANSVAGINAL US NON-OB: CPT

## 2024-05-22 RX ORDER — KETOROLAC TROMETHAMINE 30 MG/ML
15 INJECTION, SOLUTION INTRAMUSCULAR; INTRAVENOUS ONCE
Status: COMPLETED | OUTPATIENT
Start: 2024-05-22 | End: 2024-05-22

## 2024-05-22 RX ADMIN — SODIUM CHLORIDE 1000 ML: 0.9 INJECTION, SOLUTION INTRAVENOUS at 19:19

## 2024-05-22 RX ADMIN — IOHEXOL 100 ML: 350 INJECTION, SOLUTION INTRAVENOUS at 20:04

## 2024-05-22 RX ADMIN — KETOROLAC TROMETHAMINE 15 MG: 30 INJECTION, SOLUTION INTRAMUSCULAR; INTRAVENOUS at 19:18

## 2024-05-22 NOTE — ED PROVIDER NOTES
History  Chief Complaint   Patient presents with    Flank Pain     Pt reports sudden flank pain on the right side starting at a 2pm today     Patient is a 39-year-old female with relevant past medical history of acid reflux, COVID, GERD, hyperlipidemia presenting with right lower abdominal pain x 1 day. Patient was shopping at the mall around 2 PM and had sudden onset right lower abdominal pain shortly after a massage. She went home and her children noticed her in pain so she came to the emergency department. She presents to the emergency department with intermittent right lower quadrant abdominal pain and periumbilical pain. The pain is a nonradiating 10/10 every 5 minutes or so and quickly improves to a 5/10. She has not taken anything for the pain. She has never had this pain before. Her tubes are tied but has a history of ovarian cysts. She has had several ruptured cysts but not much pain with them. Her last menstrual period was 5/12/2024. She reports having mild nausea at baseline but her nausea is not really worse. She denies vomiting. She reports normal bowel movements and urination. She denies fever, chills, headache, lightheadedness, dizziness, chest pain, shortness of breath, vomiting, constipation, diarrhea, or urinary symptoms.        History provided by:  Patient   used: No    Flank Pain  Associated symptoms: no chest pain, no chills, no constipation, no cough, no diarrhea, no dysuria, no fever, no hematuria, no nausea, no shortness of breath, no sore throat and no vomiting        Prior to Admission Medications   Prescriptions Last Dose Informant Patient Reported? Taking?   ALPRAZolam (XANAX) 0.5 mg tablet  Self No No   Sig: Take 1 tablet (0.5 mg total) by mouth 30 min pre-procedure Take 2nd dose 30 mins after 1st dose if still anxious.   Patient not taking: Reported on 1/20/2024   esomeprazole (NexIUM) 40 MG capsule  Self No No   Sig: Take 1 capsule (40 mg total) by mouth daily in  the early morning   Patient not taking: Reported on 2023   ibuprofen (MOTRIN) 800 mg tablet  Self No No   Sig: Take 1 tablet (800 mg total) by mouth 3 (three) times a day as needed for moderate pain   ketorolac (TORADOL) 10 mg tablet  Self No No   Sig: Take 1 tablet (10 mg total) by mouth 2 (two) times a day as needed for moderate pain   Patient not taking: Reported on 2023   methylPREDNISolone 4 MG tablet therapy pack  Self No No   Sig: Use as directed on package   Patient not taking: Reported on 2024   naproxen (Naprosyn) 500 mg tablet Not Taking Self No No   Sig: Take 1 tablet (500 mg total) by mouth 2 (two) times a day with meals   Patient not taking: Reported on 2024   naproxen (Naprosyn) 500 mg tablet Not Taking  No No   Sig: Take 1 tablet (500 mg total) by mouth 2 (two) times a day with meals   Patient not taking: Reported on 2024   omeprazole (PriLOSEC) 40 MG capsule 2024 Self Yes Yes   Sig: Take 40 mg by mouth daily   ondansetron (Zofran ODT) 4 mg disintegrating tablet  Self No No   Sig: Take 1 tablet (4 mg total) by mouth every 6 (six) hours as needed for nausea or vomiting   Patient not taking: Reported on 2023   spironolactone (ALDACTONE) 50 mg tablet Not Taking Self Yes No   Patient not taking: Reported on 2024      Facility-Administered Medications: None       Past Medical History:   Diagnosis Date    Acid reflux     COVID-19     GERD (gastroesophageal reflux disease)     Hyperlipidemia        Past Surgical History:   Procedure Laterality Date     SECTION      DILATION AND CURETTAGE OF UTERUS      2x    TONSILECTOMY AND ADNOIDECTOMY      TONSILLECTOMY      TUBAL LIGATION         Family History   Problem Relation Age of Onset    Heart disease Mother     No Known Problems Father      I have reviewed and agree with the history as documented.    E-Cigarette/Vaping    E-Cigarette Use Never User      E-Cigarette/Vaping Substances    Nicotine No     THC No      CBD No     Flavoring No     Other No     Unknown No      Social History     Tobacco Use    Smoking status: Former     Current packs/day: 0.00     Types: Cigarettes     Quit date: 2022     Years since quittin.8    Smokeless tobacco: Never   Vaping Use    Vaping status: Never Used   Substance Use Topics    Alcohol use: Yes     Alcohol/week: 2.0 standard drinks of alcohol     Types: 2 Glasses of wine per week     Comment: weekends mostly    Drug use: Never       Review of Systems   Constitutional:  Negative for chills and fever.   HENT:  Negative for congestion, ear pain, rhinorrhea and sore throat.    Eyes:  Negative for pain and visual disturbance.   Respiratory:  Negative for cough and shortness of breath.    Cardiovascular:  Negative for chest pain and palpitations.   Gastrointestinal:  Positive for abdominal pain. Negative for constipation, diarrhea, nausea and vomiting.   Genitourinary:  Negative for dysuria, flank pain, frequency, hematuria and urgency.   Musculoskeletal:  Negative for arthralgias and back pain.   Skin:  Negative for color change and rash.   Neurological:  Negative for dizziness, seizures, syncope, light-headedness and headaches.   Psychiatric/Behavioral:  Negative for agitation and confusion.        Physical Exam  Physical Exam  Vitals and nursing note reviewed.   Constitutional:       General: She is not in acute distress.     Appearance: She is well-developed.   HENT:      Head: Normocephalic and atraumatic.   Eyes:      Conjunctiva/sclera: Conjunctivae normal.   Cardiovascular:      Rate and Rhythm: Normal rate and regular rhythm.      Heart sounds: No murmur heard.  Pulmonary:      Effort: Pulmonary effort is normal. No respiratory distress.      Breath sounds: Normal breath sounds. No wheezing, rhonchi or rales.   Abdominal:      Palpations: Abdomen is soft.      Tenderness: There is abdominal tenderness in the periumbilical area. There is no right CVA tenderness, left CVA  tenderness, guarding or rebound.   Musculoskeletal:         General: No swelling.      Cervical back: Neck supple.   Skin:     General: Skin is warm and dry.      Capillary Refill: Capillary refill takes less than 2 seconds.   Neurological:      General: No focal deficit present.      Mental Status: She is alert and oriented to person, place, and time.   Psychiatric:         Mood and Affect: Mood normal.         Vital Signs  ED Triage Vitals [05/22/24 1820]   Temperature Pulse Respirations Blood Pressure SpO2   98.5 °F (36.9 °C) 82 18 149/83 97 %      Temp src Heart Rate Source Patient Position - Orthostatic VS BP Location FiO2 (%)   -- Monitor Lying Right arm --      Pain Score       10 - Worst Possible Pain           Vitals:    05/22/24 1820 05/22/24 2015   BP: 149/83    Pulse: 82 84   Patient Position - Orthostatic VS: Lying          Visual Acuity      ED Medications  Medications   ketorolac (TORADOL) injection 15 mg (15 mg Intravenous Given 5/22/24 1918)   sodium chloride 0.9 % bolus 1,000 mL (0 mL Intravenous Stopped 5/22/24 2019)   iohexol (OMNIPAQUE) 350 MG/ML injection (MULTI-DOSE) 100 mL (100 mL Intravenous Given 5/22/24 2004)       Diagnostic Studies  Results Reviewed       Procedure Component Value Units Date/Time    Urine Microscopic [[id number]]  (Normal) Collected: 05/22/24 1914    Lab Status: Final result Specimen: Urine, Clean Catch Updated: 05/22/24 1958     RBC, UA 0-1 /hpf      WBC, UA 0-1 /hpf      Epithelial Cells Occasional /hpf      Bacteria, UA None Seen /hpf     CMP [[id number]] Collected: 05/22/24 1903    Lab Status: Final result Specimen: Blood from Arm, Left Updated: 05/22/24 1945     Sodium 136 mmol/L      Potassium 4.2 mmol/L      Chloride 103 mmol/L      CO2 25 mmol/L      ANION GAP 8 mmol/L      BUN 16 mg/dL      Creatinine 0.78 mg/dL      Glucose 89 mg/dL      Calcium 9.5 mg/dL      AST 14 U/L      ALT 21 U/L      Alkaline Phosphatase 49 U/L      Total Protein 7.3 g/dL      Albumin 4.3  g/dL      Total Bilirubin 0.22 mg/dL      eGFR 96 ml/min/1.73sq m     Narrative:      National Kidney Disease Foundation guidelines for Chronic Kidney Disease (CKD):     Stage 1 with normal or high GFR (GFR > 90 mL/min/1.73 square meters)    Stage 2 Mild CKD (GFR = 60-89 mL/min/1.73 square meters)    Stage 3A Moderate CKD (GFR = 45-59 mL/min/1.73 square meters)    Stage 3B Moderate CKD (GFR = 30-44 mL/min/1.73 square meters)    Stage 4 Severe CKD (GFR = 15-29 mL/min/1.73 square meters)    Stage 5 End Stage CKD (GFR <15 mL/min/1.73 square meters)  Note: GFR calculation is accurate only with a steady state creatinine    Lipase [[id number]]  (Normal) Collected: 05/22/24 1903    Lab Status: Final result Specimen: Blood from Arm, Left Updated: 05/22/24 1945     Lipase 39 u/L     UA w Reflex to Microscopic w Reflex to Culture [[id number]]  (Abnormal) Collected: 05/22/24 1914    Lab Status: Final result Specimen: Urine, Clean Catch Updated: 05/22/24 1926     Color, UA Straw     Clarity, UA Clear     Specific Gravity, UA 1.010     pH, UA 6.5     Leukocytes, UA Negative     Nitrite, UA Negative     Protein, UA Negative mg/dl      Glucose, UA Negative mg/dl      Ketones, UA Negative mg/dl      Urobilinogen, UA 0.2 E.U./dl      Bilirubin, UA Negative     Occult Blood, UA Trace-Intact    POCT pregnancy, urine [[id number]]  (Normal) Resulted: 05/22/24 1915    Lab Status: Final result Updated: 05/22/24 1915     EXT Preg Test, Ur Negative     Control Valid    CBC and differential [[id number]]  (Abnormal) Collected: 05/22/24 1903    Lab Status: Final result Specimen: Blood from Arm, Left Updated: 05/22/24 1910     WBC 9.47 Thousand/uL      RBC 4.33 Million/uL      Hemoglobin 11.2 g/dL      Hematocrit 36.3 %      MCV 84 fL      MCH 25.9 pg      MCHC 30.9 g/dL      RDW 14.8 %      MPV 9.5 fL      Platelets 377 Thousands/uL      nRBC 0 /100 WBCs      Segmented % 56 %      Immature Grans % 0 %      Lymphocytes % 30 %      Monocytes % 11 %       Eosinophils Relative 2 %      Basophils Relative 1 %      Absolute Neutrophils 5.32 Thousands/µL      Absolute Immature Grans 0.03 Thousand/uL      Absolute Lymphocytes 2.85 Thousands/µL      Absolute Monocytes 1.06 Thousand/µL      Eosinophils Absolute 0.16 Thousand/µL      Basophils Absolute 0.05 Thousands/µL                    CT Abdomen pelvis with contrast   Final Result by James Bhandari MD (05/22 2026)      No acute inflammatory process identified within the abdomen or pelvis.         Workstation performed: [id number]         US pelvis complete w transvaginal   Final Result by Rome Ramos MD (05/22 2002)      No signs of torsion.      Complex 24 mm left ovarian cyst. Follow-up with repeat ultrasound in 8 to 12 weeks.      US PELVIS COMPLETE W TRANSVAGINAL      INDICATION: R/o ovarian torsion, RLQ pain, hx ovarian cysts.      COMPARISON: None      FINDINGS:      No acute fracture or dislocation.      No joint effusion.      No significant degenerative changes.      No lytic or blastic osseous lesion.      Unremarkable soft tissues.      IMPRESSION:      No acute osseous abnormality.                                 Workstation performed: [id number]                    Procedures  Procedures         ED Course  ED Course as of 05/22/24 2053   Wed May 22, 2024   1820 Delay in patient being roomed.   1822 Vital signs reviewed and within normal limits other than slightly elevated blood pressure. Will repeat this.   1924 CBC and differential(!)  No leukocytosis. Hemoglobin near baseline. No platelet abnormality.   1924 PREGNANCY TEST URINE: Negative   1927 Patient at US.   1939 UA w Reflex to Microscopic w Reflex to Culture(!)  Negative for UTI.   1955 CMP  Electrolytes within normal limits. No MITA or glucose abnormality. No transaminitis.   1955 LIPASE: 39  Lipase WNL.   2008 US pelvis complete w transvaginal  IMPRESSION:  No signs of torsion.  Complex 24 mm left ovarian cyst. Follow-up with  repeat ultrasound in 8 to 12 weeks.   2012 Went over results thus far with patient. Her pain is much better and now a 5/10 at worse. No more sharp pain since arrival.   2031 CT Abdomen pelvis with contrast  IMPRESSION:  No acute inflammatory process identified within the abdomen or pelvis.   2037 Went over results with patient. She is feeling good and ready to go home. Will discharge home with strict return precautions and GI referral.                               SBIRT 22yo+      Flowsheet Row Most Recent Value   Initial Alcohol Screen: US AUDIT-C     1. How often do you have a drink containing alcohol? 0 Filed at: 05/22/2024 1823   2. How many drinks containing alcohol do you have on a typical day you are drinking?  0 Filed at: 05/22/2024 1823   3b. FEMALE Any Age, or MALE 65+: How often do you have 4 or more drinks on one occassion? 0 Filed at: 05/22/2024 1823   Audit-C Score 0 Filed at: 05/22/2024 1823   RANDY: How many times in the past year have you...    Used an illegal drug or used a prescription medication for non-medical reasons? Never Filed at: 05/22/2024 1823                      Medical Decision Making  Patient is a nontoxic-appearing 39-year-old female with with relevant past medical history of acid reflux and ovarian cyst presenting with right lower quadrant abdominal pain x 1 day. No SIRS criteria and normal vital signs.  Brief focused differential diagnosis including but not limited to: appendicitis, colitis, ovarian cyst, pyelonephritis, ectopic, kidney stone  Lower suspicion for ovarian torsion as she does not have nausea or vomiting but this still on the differential so ordered TVUS.  Abdominal labs and CT abdomen/pelvis with IV contrast due to right lower quadrant and periumbilical pain with periumbilical tenderness.  Quant. pregnancy to help rule out pregnancy and ectopic pregnancy. Offered pelvic exam but she declined as she had the transvaginal ultrasound and wants to follow-up with her  OB/GYN.  See ED course for interpretation of labs, imaging, and further medical decision making.   Toradol 15 mg IV for abdominal pain. No nausea at this time. The Toradol helped improve her pain. Provided GI referral and advised her to follow-up with her family doctor and OB/GYN.  Dispo: Patient discharged home with strict return precautions. Advised patient to return to the nearest emergency room if she has new or worsening symptoms or if any questions arise. Advised patient to follow-up with her family doctor and gastroenterology. Patient is satisfied with care and agrees with management and plan.     Amount and/or Complexity of Data Reviewed  External Data Reviewed: labs, radiology and notes.  Labs: ordered. Decision-making details documented in ED Course.  Radiology: ordered. Decision-making details documented in ED Course.    Risk  Prescription drug management.             Disposition  Final diagnoses:   Abdominal pain     Time reflects when diagnosis was documented in both MDM as applicable and the Disposition within this note       Time User Action Codes Description Comment    5/22/2024  8:32 PM Curt Laboy Add [R10.9] Abdominal pain           ED Disposition       ED Disposition   Discharge    Condition   Stable    Date/Time   Wed May 22, 2024 2033    Comment   Cristal Devine discharge to home/self care.                   Follow-up Information       Follow up With Specialties Details Why Contact Info Additional Information    [facility] Emergency Department Emergency Medicine Go to  If symptoms worsen [address] [facility] [address] [phone] [facility] Emergency Department, [address] [place], [place] [id number] [place] [address] [facility] Schedule an appointment as soon as possible for a visit   [address] [facility] [address] [phone] [facility]  [facility] [place], [address] [phone]            Patient's Medications   Discharge Prescriptions    No medications on file           PDMP Review         Value Time User    PDMP Reviewed  Yes 10/21/2023 11:57 AM Willem Ramirez DO            ED Provider  Electronically Signed by             Curt Laboy PA-C  05/22/24 2053

## 2024-05-23 NOTE — ED ATTENDING ATTESTATION
5/22/2024  I, Richie Hendrickson MD,  have discussed the patient with the resident/non-physician practitioner and agree with the resident's/non-physician practitioner's findings, Plan of Care, and MDM as documented in the resident's/non-physician practitioner's note, except where noted. All available labs and Radiology studies were reviewed.  I was present for key portions of any procedure(s) performed by the resident/non-physician practitioner and I was immediately available to provide assistance.

## 2024-05-23 NOTE — DISCHARGE INSTRUCTIONS
Immediately return to the emergency room if you experience any new or worsening symptoms or if the symptoms are lasting longer than expected.     Please follow-up with your OB/GYN to discuss the incidental findings above. Please follow-up with gastroenterology to discuss your ER visit today.    US pelvis complete w transvaginal IMPRESSION:  No signs of torsion.  Complex 24 mm left ovarian cyst. Follow-up with repeat ultrasound in 8 to 12 weeks.    CT abdomen/pelvis w contrast IMPRESSION:  No acute inflammatory process identified within the abdomen or pelvis.    Please take ibuprofen (Motrin) or acetaminophen (Tylenol) as needed for pain. These are available over-the-counter. You may take ibuprofen 600 mg every 8 hours with food for pain. You may also take acetaminophen 650 mg every 4-6 hours for pain. Do not exceed 3000 mg of Tylenol a day as this can cause liver damage. Do not drink alcohol with either of these medications. Evidence-based research has shown taking these 2 drugs together work the same (or better in some cases) for pain than opioids or narcotic pain medication.

## 2025-01-04 ENCOUNTER — HOSPITAL ENCOUNTER (INPATIENT)
Facility: HOSPITAL | Age: 40
LOS: 1 days | Discharge: HOME | DRG: 234 | End: 2025-01-07
Attending: EMERGENCY MEDICINE | Admitting: SURGERY
Payer: COMMERCIAL

## 2025-01-04 ENCOUNTER — APPOINTMENT (EMERGENCY)
Dept: CT IMAGING | Facility: HOSPITAL | Age: 40
DRG: 234 | End: 2025-01-04
Payer: COMMERCIAL

## 2025-01-04 DIAGNOSIS — K37 APPENDICITIS: Primary | ICD-10-CM

## 2025-01-04 LAB
ALBUMIN SERPL BCG-MCNC: 4.3 G/DL (ref 3.5–5)
ALP SERPL-CCNC: 49 U/L (ref 34–104)
ALT SERPL W P-5'-P-CCNC: 13 U/L (ref 7–52)
ANION GAP SERPL CALCULATED.3IONS-SCNC: 8 MMOL/L (ref 4–13)
AST SERPL W P-5'-P-CCNC: 10 U/L (ref 13–39)
BACTERIA UR QL AUTO: NORMAL /HPF
BASOPHILS # BLD AUTO: 0.05 THOUSANDS/ΜL (ref 0–0.1)
BASOPHILS NFR BLD AUTO: 0 % (ref 0–1)
BILIRUB SERPL-MCNC: 0.24 MG/DL (ref 0.2–1)
BILIRUB UR QL STRIP: NEGATIVE
BUN SERPL-MCNC: 19 MG/DL (ref 5–25)
CALCIUM SERPL-MCNC: 9.4 MG/DL (ref 8.4–10.2)
CHLORIDE SERPL-SCNC: 104 MMOL/L (ref 96–108)
CLARITY UR: CLEAR
CO2 SERPL-SCNC: 23 MMOL/L (ref 21–32)
COLOR UR: ABNORMAL
CREAT SERPL-MCNC: 0.8 MG/DL (ref 0.6–1.3)
EOSINOPHIL # BLD AUTO: 0.14 THOUSAND/ΜL (ref 0–0.61)
EOSINOPHIL NFR BLD AUTO: 1 % (ref 0–6)
ERYTHROCYTE [DISTWIDTH] IN BLOOD BY AUTOMATED COUNT: 14.4 % (ref 11.6–15.1)
GFR SERPL CREATININE-BSD FRML MDRD: 93 ML/MIN/1.73SQ M
GLUCOSE SERPL-MCNC: 106 MG/DL (ref 65–140)
GLUCOSE UR STRIP-MCNC: NEGATIVE MG/DL
HCT VFR BLD AUTO: 38 % (ref 34.8–46.1)
HGB BLD-MCNC: 12 G/DL (ref 11.5–15.4)
HGB UR QL STRIP.AUTO: ABNORMAL
IMM GRANULOCYTES # BLD AUTO: 0.05 THOUSAND/UL (ref 0–0.2)
IMM GRANULOCYTES NFR BLD AUTO: 0 % (ref 0–2)
KETONES UR STRIP-MCNC: NEGATIVE MG/DL
LEUKOCYTE ESTERASE UR QL STRIP: NEGATIVE
LIPASE SERPL-CCNC: 30 U/L (ref 11–82)
LYMPHOCYTES # BLD AUTO: 2.35 THOUSANDS/ΜL (ref 0.6–4.47)
LYMPHOCYTES NFR BLD AUTO: 15 % (ref 14–44)
MCH RBC QN AUTO: 26.7 PG (ref 26.8–34.3)
MCHC RBC AUTO-ENTMCNC: 31.6 G/DL (ref 31.4–37.4)
MCV RBC AUTO: 84 FL (ref 82–98)
MONOCYTES # BLD AUTO: 1.08 THOUSAND/ΜL (ref 0.17–1.22)
MONOCYTES NFR BLD AUTO: 7 % (ref 4–12)
NEUTROPHILS # BLD AUTO: 12.06 THOUSANDS/ΜL (ref 1.85–7.62)
NEUTS SEG NFR BLD AUTO: 77 % (ref 43–75)
NITRITE UR QL STRIP: NEGATIVE
NON-SQ EPI CELLS URNS QL MICRO: NORMAL /HPF
NRBC BLD AUTO-RTO: 0 /100 WBCS
PH UR STRIP.AUTO: 6 [PH]
PLATELET # BLD AUTO: 316 THOUSANDS/UL (ref 149–390)
PMV BLD AUTO: 9.3 FL (ref 8.9–12.7)
POTASSIUM SERPL-SCNC: 4.1 MMOL/L (ref 3.5–5.3)
PROT SERPL-MCNC: 7.1 G/DL (ref 6.4–8.4)
PROT UR STRIP-MCNC: NEGATIVE MG/DL
RBC # BLD AUTO: 4.5 MILLION/UL (ref 3.81–5.12)
RBC #/AREA URNS AUTO: NORMAL /HPF
SODIUM SERPL-SCNC: 135 MMOL/L (ref 135–147)
SP GR UR STRIP.AUTO: 1.02
UROBILINOGEN UR QL STRIP.AUTO: 0.2 E.U./DL
WBC # BLD AUTO: 15.73 THOUSAND/UL (ref 4.31–10.16)
WBC #/AREA URNS AUTO: NORMAL /HPF

## 2025-01-04 PROCEDURE — 81001 URINALYSIS AUTO W/SCOPE: CPT | Performed by: EMERGENCY MEDICINE

## 2025-01-04 PROCEDURE — 80053 COMPREHEN METABOLIC PANEL: CPT | Performed by: EMERGENCY MEDICINE

## 2025-01-04 PROCEDURE — 84145 PROCALCITONIN (PCT): CPT | Performed by: EMERGENCY MEDICINE

## 2025-01-04 PROCEDURE — 85025 COMPLETE CBC W/AUTO DIFF WBC: CPT | Performed by: EMERGENCY MEDICINE

## 2025-01-04 PROCEDURE — 74177 CT ABD & PELVIS W/CONTRAST: CPT

## 2025-01-04 PROCEDURE — 83690 ASSAY OF LIPASE: CPT | Performed by: EMERGENCY MEDICINE

## 2025-01-04 PROCEDURE — 99284 EMERGENCY DEPT VISIT MOD MDM: CPT

## 2025-01-04 PROCEDURE — 36415 COLL VENOUS BLD VENIPUNCTURE: CPT | Performed by: EMERGENCY MEDICINE

## 2025-01-04 PROCEDURE — 99285 EMERGENCY DEPT VISIT HI MDM: CPT | Performed by: EMERGENCY MEDICINE

## 2025-01-04 RX ADMIN — IOHEXOL 100 ML: 350 INJECTION, SOLUTION INTRAVENOUS at 23:40

## 2025-01-05 ENCOUNTER — ANESTHESIA (OUTPATIENT)
Dept: PERIOP | Facility: HOSPITAL | Age: 40
DRG: 234 | End: 2025-01-05
Payer: COMMERCIAL

## 2025-01-05 ENCOUNTER — ANESTHESIA EVENT (OUTPATIENT)
Dept: PERIOP | Facility: HOSPITAL | Age: 40
DRG: 234 | End: 2025-01-05
Payer: COMMERCIAL

## 2025-01-05 PROBLEM — K35.30 ACUTE APPENDICITIS WITH LOCALIZED PERITONITIS, WITHOUT PERFORATION, ABSCESS, OR GANGRENE: Status: ACTIVE | Noted: 2025-01-05

## 2025-01-05 LAB
APTT PPP: 25 SECONDS (ref 23–34)
EXT PREGNANCY TEST URINE: NEGATIVE
EXT. CONTROL: NORMAL
INR PPP: 0.92 (ref 0.85–1.19)
LACTATE SERPL-SCNC: 0.8 MMOL/L (ref 0.5–2)
PLATELET # BLD AUTO: 279 THOUSANDS/UL (ref 149–390)
PMV BLD AUTO: 9.5 FL (ref 8.9–12.7)
PROCALCITONIN SERPL-MCNC: <0.05 NG/ML
PROTHROMBIN TIME: 12.9 SECONDS (ref 12.3–15)

## 2025-01-05 PROCEDURE — 87040 BLOOD CULTURE FOR BACTERIA: CPT | Performed by: SURGERY

## 2025-01-05 PROCEDURE — 88304 TISSUE EXAM BY PATHOLOGIST: CPT | Performed by: PATHOLOGY

## 2025-01-05 PROCEDURE — 85610 PROTHROMBIN TIME: CPT | Performed by: SURGERY

## 2025-01-05 PROCEDURE — 85730 THROMBOPLASTIN TIME PARTIAL: CPT | Performed by: SURGERY

## 2025-01-05 PROCEDURE — 85049 AUTOMATED PLATELET COUNT: CPT | Performed by: SURGERY

## 2025-01-05 PROCEDURE — 36415 COLL VENOUS BLD VENIPUNCTURE: CPT | Performed by: SURGERY

## 2025-01-05 PROCEDURE — 0DTJ4ZZ RESECTION OF APPENDIX, PERCUTANEOUS ENDOSCOPIC APPROACH: ICD-10-PCS | Performed by: SURGERY

## 2025-01-05 PROCEDURE — 83605 ASSAY OF LACTIC ACID: CPT | Performed by: SURGERY

## 2025-01-05 PROCEDURE — 44970 LAPAROSCOPY APPENDECTOMY: CPT | Performed by: PHYSICIAN ASSISTANT

## 2025-01-05 RX ORDER — SODIUM CHLORIDE 9 MG/ML
100 INJECTION, SOLUTION INTRAVENOUS CONTINUOUS
Status: DISCONTINUED | OUTPATIENT
Start: 2025-01-05 | End: 2025-01-07

## 2025-01-05 RX ORDER — ONDANSETRON 2 MG/ML
4 INJECTION INTRAMUSCULAR; INTRAVENOUS ONCE AS NEEDED
Status: DISCONTINUED | OUTPATIENT
Start: 2025-01-05 | End: 2025-01-05 | Stop reason: HOSPADM

## 2025-01-05 RX ORDER — METRONIDAZOLE 500 MG/100ML
INJECTION, SOLUTION INTRAVENOUS CONTINUOUS PRN
Status: DISCONTINUED | OUTPATIENT
Start: 2025-01-05 | End: 2025-01-05

## 2025-01-05 RX ORDER — CEFAZOLIN SODIUM 2 G/50ML
2000 SOLUTION INTRAVENOUS EVERY 8 HOURS
Status: COMPLETED | OUTPATIENT
Start: 2025-01-05 | End: 2025-01-06

## 2025-01-05 RX ORDER — KETOROLAC TROMETHAMINE 30 MG/ML
15 INJECTION, SOLUTION INTRAMUSCULAR; INTRAVENOUS EVERY 6 HOURS PRN
Status: DISCONTINUED | OUTPATIENT
Start: 2025-01-05 | End: 2025-01-06

## 2025-01-05 RX ORDER — HYDROMORPHONE HCL/PF 1 MG/ML
0.5 SYRINGE (ML) INJECTION
Status: DISCONTINUED | OUTPATIENT
Start: 2025-01-05 | End: 2025-01-05

## 2025-01-05 RX ORDER — LIDOCAINE HYDROCHLORIDE 20 MG/ML
INJECTION, SOLUTION EPIDURAL; INFILTRATION; INTRACAUDAL; PERINEURAL AS NEEDED
Status: DISCONTINUED | OUTPATIENT
Start: 2025-01-05 | End: 2025-01-05

## 2025-01-05 RX ORDER — FENTANYL CITRATE 50 UG/ML
INJECTION, SOLUTION INTRAMUSCULAR; INTRAVENOUS AS NEEDED
Status: DISCONTINUED | OUTPATIENT
Start: 2025-01-05 | End: 2025-01-05

## 2025-01-05 RX ORDER — ONDANSETRON 2 MG/ML
4 INJECTION INTRAMUSCULAR; INTRAVENOUS EVERY 6 HOURS PRN
Status: DISCONTINUED | OUTPATIENT
Start: 2025-01-05 | End: 2025-01-07 | Stop reason: HOSPADM

## 2025-01-05 RX ORDER — ROCURONIUM BROMIDE 10 MG/ML
INJECTION, SOLUTION INTRAVENOUS AS NEEDED
Status: DISCONTINUED | OUTPATIENT
Start: 2025-01-05 | End: 2025-01-05

## 2025-01-05 RX ORDER — HYDROMORPHONE HCL IN WATER/PF 6 MG/30 ML
0.2 PATIENT CONTROLLED ANALGESIA SYRINGE INTRAVENOUS EVERY 4 HOURS PRN
Status: DISCONTINUED | OUTPATIENT
Start: 2025-01-05 | End: 2025-01-05

## 2025-01-05 RX ORDER — CEFAZOLIN SODIUM 2 G/50ML
2000 SOLUTION INTRAVENOUS EVERY 8 HOURS
Status: DISCONTINUED | OUTPATIENT
Start: 2025-01-05 | End: 2025-01-05

## 2025-01-05 RX ORDER — METRONIDAZOLE 500 MG/1
500 TABLET ORAL EVERY 8 HOURS SCHEDULED
Status: DISCONTINUED | OUTPATIENT
Start: 2025-01-05 | End: 2025-01-05

## 2025-01-05 RX ORDER — DEXAMETHASONE SODIUM PHOSPHATE 10 MG/ML
INJECTION, SOLUTION INTRAMUSCULAR; INTRAVENOUS AS NEEDED
Status: DISCONTINUED | OUTPATIENT
Start: 2025-01-05 | End: 2025-01-05

## 2025-01-05 RX ORDER — HEPARIN SODIUM 5000 [USP'U]/ML
5000 INJECTION, SOLUTION INTRAVENOUS; SUBCUTANEOUS EVERY 12 HOURS SCHEDULED
Status: DISCONTINUED | OUTPATIENT
Start: 2025-01-05 | End: 2025-01-07 | Stop reason: HOSPADM

## 2025-01-05 RX ORDER — LORAZEPAM 2 MG/ML
0.5 INJECTION INTRAMUSCULAR EVERY 6 HOURS PRN
Status: DISCONTINUED | OUTPATIENT
Start: 2025-01-05 | End: 2025-01-07 | Stop reason: HOSPADM

## 2025-01-05 RX ORDER — ACETAMINOPHEN 10 MG/ML
1000 INJECTION, SOLUTION INTRAVENOUS EVERY 6 HOURS PRN
Status: DISCONTINUED | OUTPATIENT
Start: 2025-01-05 | End: 2025-01-05

## 2025-01-05 RX ORDER — PROCHLORPERAZINE 25 MG
25 SUPPOSITORY, RECTAL RECTAL EVERY 12 HOURS PRN
Status: DISCONTINUED | OUTPATIENT
Start: 2025-01-05 | End: 2025-01-06

## 2025-01-05 RX ORDER — HYDROMORPHONE HCL IN WATER/PF 6 MG/30 ML
0.2 PATIENT CONTROLLED ANALGESIA SYRINGE INTRAVENOUS ONCE
Refills: 0 | Status: DISCONTINUED | OUTPATIENT
Start: 2025-01-05 | End: 2025-01-05

## 2025-01-05 RX ORDER — BUPIVACAINE HYDROCHLORIDE 2.5 MG/ML
INJECTION, SOLUTION EPIDURAL; INFILTRATION; INTRACAUDAL AS NEEDED
Status: DISCONTINUED | OUTPATIENT
Start: 2025-01-05 | End: 2025-01-05 | Stop reason: HOSPADM

## 2025-01-05 RX ORDER — MAGNESIUM HYDROXIDE 1200 MG/15ML
LIQUID ORAL AS NEEDED
Status: DISCONTINUED | OUTPATIENT
Start: 2025-01-05 | End: 2025-01-05 | Stop reason: HOSPADM

## 2025-01-05 RX ORDER — MIDAZOLAM HYDROCHLORIDE 2 MG/2ML
INJECTION, SOLUTION INTRAMUSCULAR; INTRAVENOUS AS NEEDED
Status: DISCONTINUED | OUTPATIENT
Start: 2025-01-05 | End: 2025-01-05

## 2025-01-05 RX ORDER — FENTANYL CITRATE/PF 50 MCG/ML
25 SYRINGE (ML) INJECTION
Status: DISCONTINUED | OUTPATIENT
Start: 2025-01-05 | End: 2025-01-05 | Stop reason: HOSPADM

## 2025-01-05 RX ORDER — SODIUM CHLORIDE, SODIUM LACTATE, POTASSIUM CHLORIDE, CALCIUM CHLORIDE 600; 310; 30; 20 MG/100ML; MG/100ML; MG/100ML; MG/100ML
INJECTION, SOLUTION INTRAVENOUS CONTINUOUS PRN
Status: DISCONTINUED | OUTPATIENT
Start: 2025-01-05 | End: 2025-01-05

## 2025-01-05 RX ORDER — LIDOCAINE HYDROCHLORIDE AND EPINEPHRINE 10; 10 MG/ML; UG/ML
INJECTION, SOLUTION INFILTRATION; PERINEURAL AS NEEDED
Status: DISCONTINUED | OUTPATIENT
Start: 2025-01-05 | End: 2025-01-05 | Stop reason: HOSPADM

## 2025-01-05 RX ORDER — SUCCINYLCHOLINE/SOD CL,ISO/PF 100 MG/5ML
SYRINGE (ML) INTRAVENOUS AS NEEDED
Status: DISCONTINUED | OUTPATIENT
Start: 2025-01-05 | End: 2025-01-05

## 2025-01-05 RX ORDER — METRONIDAZOLE 500 MG/1
500 TABLET ORAL EVERY 8 HOURS SCHEDULED
Status: COMPLETED | OUTPATIENT
Start: 2025-01-05 | End: 2025-01-06

## 2025-01-05 RX ORDER — PROPOFOL 10 MG/ML
INJECTION, EMULSION INTRAVENOUS AS NEEDED
Status: DISCONTINUED | OUTPATIENT
Start: 2025-01-05 | End: 2025-01-05

## 2025-01-05 RX ADMIN — SODIUM CHLORIDE 100 ML/HR: 0.9 INJECTION, SOLUTION INTRAVENOUS at 05:04

## 2025-01-05 RX ADMIN — CEFAZOLIN SODIUM 2000 MG: 2 SOLUTION INTRAVENOUS at 01:31

## 2025-01-05 RX ADMIN — Medication 8 MCG: at 12:26

## 2025-01-05 RX ADMIN — LORAZEPAM 0.5 MG: 2 INJECTION INTRAMUSCULAR; INTRAVENOUS at 04:35

## 2025-01-05 RX ADMIN — PROPOFOL 200 MG: 10 INJECTION, EMULSION INTRAVENOUS at 12:00

## 2025-01-05 RX ADMIN — SUGAMMADEX 200 MG: 100 INJECTION, SOLUTION INTRAVENOUS at 12:47

## 2025-01-05 RX ADMIN — FENTANYL CITRATE 50 MCG: 50 INJECTION INTRAMUSCULAR; INTRAVENOUS at 12:04

## 2025-01-05 RX ADMIN — ONDANSETRON 4 MG: 2 INJECTION INTRAMUSCULAR; INTRAVENOUS at 14:12

## 2025-01-05 RX ADMIN — Medication 8 MCG: at 12:17

## 2025-01-05 RX ADMIN — MORPHINE SULFATE 2 MG: 2 INJECTION, SOLUTION INTRAMUSCULAR; INTRAVENOUS at 05:03

## 2025-01-05 RX ADMIN — SODIUM CHLORIDE, SODIUM LACTATE, POTASSIUM CHLORIDE, AND CALCIUM CHLORIDE: .6; .31; .03; .02 INJECTION, SOLUTION INTRAVENOUS at 12:40

## 2025-01-05 RX ADMIN — METRONIDAZOLE 500 MG: 500 TABLET ORAL at 22:01

## 2025-01-05 RX ADMIN — METRONIDAZOLE 500 MG: 500 TABLET ORAL at 01:30

## 2025-01-05 RX ADMIN — ROCURONIUM BROMIDE 20 MG: 10 INJECTION, SOLUTION INTRAVENOUS at 12:05

## 2025-01-05 RX ADMIN — KETOROLAC TROMETHAMINE 15 MG: 30 INJECTION, SOLUTION INTRAMUSCULAR; INTRAVENOUS at 22:02

## 2025-01-05 RX ADMIN — FENTANYL CITRATE 25 MCG: 50 INJECTION INTRAMUSCULAR; INTRAVENOUS at 13:29

## 2025-01-05 RX ADMIN — ACETAMINOPHEN 1000 MG: 1000 INJECTION, SOLUTION INTRAVENOUS at 12:08

## 2025-01-05 RX ADMIN — PROPOFOL 80 MCG/KG/MIN: 10 INJECTION, EMULSION INTRAVENOUS at 12:02

## 2025-01-05 RX ADMIN — FENTANYL CITRATE 25 MCG: 50 INJECTION INTRAMUSCULAR; INTRAVENOUS at 13:39

## 2025-01-05 RX ADMIN — FENTANYL CITRATE 50 MCG: 50 INJECTION INTRAMUSCULAR; INTRAVENOUS at 12:15

## 2025-01-05 RX ADMIN — MIDAZOLAM 2 MG: 1 INJECTION INTRAMUSCULAR; INTRAVENOUS at 11:55

## 2025-01-05 RX ADMIN — CEFAZOLIN SODIUM 2000 MG: 2 SOLUTION INTRAVENOUS at 09:57

## 2025-01-05 RX ADMIN — SODIUM CHLORIDE 1000 ML: 0.9 INJECTION, SOLUTION INTRAVENOUS at 01:32

## 2025-01-05 RX ADMIN — HYDROMORPHONE HYDROCHLORIDE 0.5 MG: 1 INJECTION, SOLUTION INTRAMUSCULAR; INTRAVENOUS; SUBCUTANEOUS at 01:20

## 2025-01-05 RX ADMIN — METRONIDAZOLE: 500 INJECTION, SOLUTION INTRAVENOUS at 12:02

## 2025-01-05 RX ADMIN — Medication 4 MCG: at 12:49

## 2025-01-05 RX ADMIN — SODIUM CHLORIDE, SODIUM LACTATE, POTASSIUM CHLORIDE, AND CALCIUM CHLORIDE: .6; .31; .03; .02 INJECTION, SOLUTION INTRAVENOUS at 11:42

## 2025-01-05 RX ADMIN — ROCURONIUM BROMIDE 20 MG: 10 INJECTION, SOLUTION INTRAVENOUS at 12:33

## 2025-01-05 RX ADMIN — ACETAMINOPHEN 1000 MG: 1000 INJECTION, SOLUTION INTRAVENOUS at 03:23

## 2025-01-05 RX ADMIN — DEXAMETHASONE SODIUM PHOSPHATE 4 MG: 10 INJECTION, SOLUTION INTRAMUSCULAR; INTRAVENOUS at 12:02

## 2025-01-05 RX ADMIN — SODIUM CHLORIDE 100 ML/HR: 0.9 INJECTION, SOLUTION INTRAVENOUS at 19:47

## 2025-01-05 RX ADMIN — LIDOCAINE HYDROCHLORIDE 80 MG: 20 INJECTION, SOLUTION EPIDURAL; INFILTRATION; INTRACAUDAL; PERINEURAL at 11:59

## 2025-01-05 RX ADMIN — ONDANSETRON 4 MG: 2 INJECTION INTRAMUSCULAR; INTRAVENOUS at 03:00

## 2025-01-05 RX ADMIN — METRONIDAZOLE: 500 INJECTION, SOLUTION INTRAVENOUS at 11:46

## 2025-01-05 RX ADMIN — KETOROLAC TROMETHAMINE 15 MG: 30 INJECTION, SOLUTION INTRAMUSCULAR; INTRAVENOUS at 16:56

## 2025-01-05 RX ADMIN — Medication 100 MG: at 12:00

## 2025-01-05 RX ADMIN — FENTANYL CITRATE 25 MCG: 50 INJECTION INTRAMUSCULAR; INTRAVENOUS at 13:46

## 2025-01-05 RX ADMIN — FENTANYL CITRATE 25 MCG: 50 INJECTION INTRAMUSCULAR; INTRAVENOUS at 13:34

## 2025-01-05 RX ADMIN — CEFAZOLIN SODIUM 2000 MG: 2 SOLUTION INTRAVENOUS at 16:55

## 2025-01-05 RX ADMIN — ONDANSETRON 4 MG: 2 INJECTION INTRAMUSCULAR; INTRAVENOUS at 12:26

## 2025-01-05 RX ADMIN — SODIUM CHLORIDE 1000 ML: 0.9 INJECTION, SOLUTION INTRAVENOUS at 03:04

## 2025-01-05 NOTE — ANESTHESIA PREPROCEDURE EVALUATION
Procedure:  APPENDECTOMY LAPAROSCOPIC (Abdomen)    Obesity BMI 30  Neg preg test  NPO since 8a ice chips  GERD poorly controlled this am, did not receive her PPI - plan for RSI given aspiration risk    Quit smoking 2 years ago  Relevant Problems   CARDIO   (+) Chest pain      GI/HEPATIC   (+) Gastroesophageal reflux disease without esophagitis      NEURO/PSYCH   (+) Anxiety   (+) Stress response      PULMONARY   (+) Acute respiratory failure with hypoxemia (HCC)   (+) Smoking        Physical Exam    Airway    Mallampati score: II  TM Distance: >3 FB       Dental   No notable dental hx     Cardiovascular  Cardiovascular exam normal    Pulmonary  Pulmonary exam normal     Other Findings        Anesthesia Plan  ASA Score- 2 Emergent    Anesthesia Type- general with ASA Monitors.         Additional Monitors:     Airway Plan: ETT.    Comment: Risks/benefits and alternatives discussed with patient including possible PONV, sore throat, damage to teeth/lips/gums/esophagus, and possibility of rare anesthetic and surgical emergencies including but not limited to heart attack, stroke, and/or death. All questions were answered.  .       Plan Factors-Exercise tolerance (METS): >4 METS.    Chart reviewed.   Existing labs reviewed. Patient summary reviewed.    Patient is not a current smoker.              Induction- intravenous.    Postoperative Plan- Plan for postoperative opioid use. Planned trial extubation        Informed Consent- Anesthetic plan and risks discussed with patient.  I personally reviewed this patient with the CRNA. Discussed and agreed on the Anesthesia Plan with the CRNA..

## 2025-01-05 NOTE — OP NOTE
OPERATIVE REPORT  PATIENT NAME: Cristal Devine    :  1985  MRN: [id number]  Pt Location: [facility] OR ROOM 02    SURGERY DATE: 2025    Surgeons and Role:     * Cally Mckinnon DO - Primary     * Bree Carreon PA-C - Assisting    Preop Diagnosis:  Appendicitis [K37]    Post-Op Diagnosis Codes:     * Appendicitis [K37]    Procedure(s):  APPENDECTOMY LAPAROSCOPIC    Specimen(s):  ID Type Source Tests Collected by Time Destination   1 : APPENDIX Tissue Appendix TISSUE EXAM Cally Mckinnon DO 2025 12:33 PM        Estimated Blood Loss:   3 mL    Drains:  Urethral Catheter Non-latex 16 Fr. (Active)   Number of days: 0       [REMOVED] NG/OG/Enteral Tube Orogastric 16 Fr Center mouth (Removed)   Number of days: 0       Anesthesia Type:   General    Operative Indications:  Appendicitis [K37]      Operative Findings:  The appendix was elongated, hyperemic, and enlarged.  There was no gross purulence.  There was no perforation.      Complications:   None    Procedure and Technique:  After obtaining informed consent, the patient was brought in the operating room and placed in supine position.  SCDs were placed and she was placed under general anesthesia.  A Milton catheter was then sterilely placed and she was prepped and draped in usual sterile fashion.  A timeout was then performed.  Appropriate antibiotics have been administered.  Using #15 blade, a transverse infraumbilical incision was fashioned through the skin and subcutaneous tissue.  Dissection was continued to the fascia which was then incised.  Stay sutures of 0 Vicryl placed on either side and the dissection was continued to the peritoneum which was then carefully opened.  Blunt digital inspection was utilized to make sure there are no adhesions to the anterior abdominal wall, which there were not.  A blunt 11 mm Bhatt trocar was then placed and a pneumoperitoneum to 15 mmHg was then established.  Under direct laparoscopic visualization, utilizing  transillumination, a 12 mm left paramedian trocar and a 5 mm suprapubic trocar were then placed.  The cecum was identified and retracted medially.  Immediately noted was in a very elongated hyperemic appendix which was enlarged.  The appendix was grasped for retraction purposes.  There is a very fatty mesoappendix associated with the appendix.  A window was made in the mesoappendix near the confluence of the base of the appendix into the cecum.  The base of the appendix was soft and noninflamed.  The Endo PERRY tissue load was fired across the base of the appendix.  There was excellent purchase of tissue and no oozing or bleeding from the staple line.  Windows were made in the very large thickened mesoappendix and 3 firings of the Endo PERRY vascular load were serially fired across the mesoappendix.  Once again there was excellent purchase of tissue and no oozing from the staple lines.  The appendix was then placed into an Endo Catch bag and removed from the left paramedian trocar site without incident.  All sponge, needle, and instrument counts were correct.  The area was then copiously irrigated with saline solution.  The trocars were removed under direct visualization and there was no active hemorrhaging from any of the trocar sites.  The pneumoperitoneum was fully released from the abdominal cavity.  The fascia of the infraumbilical incision and left paramedian incision were closed with figure-of-eight 0 Vicryl sutures.  The incisions were then injected with local anesthetic.  The skin incisions were then closed with running subcuticular stitches of 4-0 Monocryl suture.  Incisions were cleansed well and benzoin, Steri-Strips, and sterile dressings were applied.  The Milton catheter was then removed without incident.  The patient was extubated in the operating room without difficulty.  The patient tolerated the procedure well was transferred to recovery in stable and satisfactory condition.  The assistance of the PA  was essential in the case for assistance with operation of the camera, retraction and exposure of tissue.   I was present for the entire procedure.    Patient Disposition:  PACU     This procedure was not performed to treat colon cancer through resection           SIGNATURE: Calyl Mckinnon DO  DATE: January 5, 2025  TIME: 12:54 PM

## 2025-01-05 NOTE — ANESTHESIA POSTPROCEDURE EVALUATION
Post-Op Assessment Note    CV Status:  Stable    Pain management: adequate       Mental Status:  Alert and awake   Hydration Status:  Euvolemic   PONV Controlled:  Controlled   Airway Patency:  Patent     Post Op Vitals Reviewed: Yes    No anethesia notable event occurred.            Last Filed PACU Vitals:  Vitals Value Taken Time   Temp 97.9 °F (36.6 °C) 01/05/25 1310   Pulse 64 01/05/25 1339   /64 01/05/25 1330   Resp 17 01/05/25 1339   SpO2 99 % 01/05/25 1339   Vitals shown include unfiled device data.    Modified James:     Vitals Value Taken Time   Activity 2 01/05/25 1325   Respiration 2 01/05/25 1325   Circulation 2 01/05/25 1325   Consciousness 1 01/05/25 1325   Oxygen Saturation 1 01/05/25 1325     Modified James Score: 8

## 2025-01-05 NOTE — H&P
H&P - General Surgery   Cristal Devine 39 y.o. female MRN: [id number]  Unit/Bed#: -01 Encounter: [id number]  Assessment:  Acute appendicitis  Plan:  I discussed with the patient that surgical intervention is indicated.  Other options were discussed.  The procedure risks of a laparoscopic appendectomy, possible open appendectomy were thoroughly discussed with the patient.  Risks include, but are not limited to, bleeding, infection, bowel injury, stump leakage, abscess formation, poor wound healing, and/or hernias.  She understands and consent was obtained.  She will be taken to the operating room today.    History of Present Illness   Chief Complaint: Right lower quadrant pain    HPI: The patient is a 39-year-old female who states that about 2:00 on Saturday she started to get periumbilical discomfort.  She states that it then by about 4:00 it radiated down to her right lower quadrant and by 6 PM yesterday she had severe right lower quadrant discomfort.  She did develop nausea, but no vomiting.  She has felt that her abdomen was significantly bloated.  She did have some diarrhea as well.  She has never had similar pain in the past.  Moving makes the pain worse.  Nothing makes the pain better.  She tried to eat a few bites of chicken last night at 6:00, but could not keep it down.  She denies any change in her bowel habits.  Her last menses was December 17 and it was normal.  She denies any urinary symptomatology.  I discussed her Dilaudid allergy which was on the chart and there seem to be a little confusion this morning.  The patient states that she felt that her muscles got tight in her throat but she did not get any shortness of breath or rash at the time.  I will place Dilaudid back on her allergy list with that symptomatology and will not order.    Review of Systems a 14 point review of systems was negative except was discussed in the HPI    Historical Information   Past Medical History:   Diagnosis  "Date    Acid reflux     COVID-19     GERD (gastroesophageal reflux disease)     Hyperlipidemia      Past Surgical History:   Procedure Laterality Date     SECTION      DILATION AND CURETTAGE OF UTERUS      2x    TONSILECTOMY AND ADNOIDECTOMY      TONSILLECTOMY      TUBAL LIGATION       Social History   Social History     Substance and Sexual Activity   Alcohol Use Not Currently    Alcohol/week: 2.0 standard drinks of alcohol    Types: 2 Glasses of wine per week    Comment: weekends mostly     Social History     Substance and Sexual Activity   Drug Use Never     Social History     Tobacco Use   Smoking Status Former    Current packs/day: 0.00    Types: Cigarettes    Quit date: 2022    Years since quittin.4   Smokeless Tobacco Never     E-Cigarette/Vaping    E-Cigarette Use Never User      E-Cigarette/Vaping Substances    Nicotine No     THC No     CBD No     Flavoring No     Other No     Unknown No      Family History: Family history non-contributory    Meds/Allergies   all medications and allergies reviewed  Allergies   Allergen Reactions    Metoclopramide Myalgia       Objective   First Vitals:   Blood Pressure: 156/91 (25)  Pulse: 91 (25)  Temperature: 98.8 °F (37.1 °C) (25)  Temp Source: Tympanic (25)  Respirations: 18 (25)  Height: 5' 3\" (160 cm) (25)  Weight - Scale: 78 kg (171 lb 15.3 oz) (25)  SpO2: 98 % (25)    Current Vitals:   Blood Pressure: 100/62 (25 0742)  Pulse: 86 (250)  Temperature: 98 °F (36.7 °C) (2542)  Temp Source: Tympanic (25)  Respirations: 17 (250)  Height: 5' 3\" (160 cm) (25)  Weight - Scale: 78 kg (171 lb 15.3 oz) (25)  SpO2: 96 % (252)    No intake or output data in the 24 hours ending 25 0900    Invasive Devices       Peripheral Intravenous Line  Duration             Peripheral IV 25 " Left;Upper;Ventral (anterior) Arm <1 day                    Physical Exam  General-awake and alert, nontoxic-appearing, no distress  HEENT-nonicteric  Heart-regular rate and rhythm  Lungs-clear to auscultation bilaterally  Abdomen-mildly distended but soft, positive Rovsing's, positive tenderness at McBurney's point, mild rebound, cannot appreciate small umbilical hernia that was seen on CT scan due to patient body habitus  Extremity-no Homans    Lab Results: I have personally reviewed pertinent lab results.    Imaging: Results Review Statement: I reviewed radiology reports from this admission including: CT abdomen/pelvis.  EKG, Pathology, and Other Studies: Results Review Statement: I reviewed radiology reports from this admission including: CT abdomen/pelvis.    Code Status: Level 1 - Full Code  Advance Directive and Living Will:      Power of :    POLST:      Counseling / Coordination of Care  Total floor / unit time spent today 30 minutes.  Greater than 50% of total time was spent with the patient and / or family counseling and / or coordination of care.  A description of the counseling / coordination of care: .

## 2025-01-05 NOTE — ED PROVIDER NOTES
Time reflects when diagnosis was documented in both MDM as applicable and the Disposition within this note       Time User Action Codes Description Comment    1/5/2025 12:58 AM Richie Hendrickson Add [K37] Appendicitis           ED Disposition       ED Disposition   Admit    Condition   Stable    Date/Time   Sun Jan 5, 2025 12:58 AM    Comment   Case was discussed with  and the patient's admission status was agreed to be  to the service of   .               Assessment & Plan       Medical Decision Making  39-year-old female with appendicitis.  Initially declined pain medications because she was worried about not obtaining a ride home.  Patient has elevated white count, abdominal pain, and consistent CT.  Patient excepted to Dr. Mckinnon's general surgery service.  Started on antibiotics.  Sepsis order set completed.  Patient responding well to Dilaudid.  Informed of likely need for surgery in the next day or so.  Patient states understanding agreement the plan.  Stable at the time of admission.      Problems Addressed:  Appendicitis: acute illness or injury    Amount and/or Complexity of Data Reviewed  External Data Reviewed: notes.  Labs: ordered.  Radiology: ordered.    Risk  Prescription drug management.  Decision regarding hospitalization.             Medications   sodium chloride 0.9 % bolus 1,000 mL (1,000 mL Intravenous New Bag 1/5/25 0132)     Followed by   sodium chloride 0.9 % bolus 1,000 mL (has no administration in time range)   sodium chloride 0.9 % infusion (has no administration in time range)   ondansetron (ZOFRAN) injection 4 mg (has no administration in time range)   heparin (porcine) subcutaneous injection 5,000 Units (5,000 Units Subcutaneous Not Given 1/5/25 0130)   ceFAZolin (ANCEF) IVPB (premix in dextrose) 2,000 mg 50 mL (2,000 mg Intravenous New Bag 1/5/25 0131)   metroNIDAZOLE (FLAGYL) tablet 500 mg (500 mg Oral Given 1/5/25 0130)   acetaminophen (Ofirmev) injection 1,000 mg (has no  administration in time range)   HYDROmorphone (DILAUDID) injection 0.5 mg (0.5 mg Intravenous Given 25 0120)   iohexol (OMNIPAQUE) 350 MG/ML injection (SINGLE-DOSE) 100 mL (100 mL Intravenous Given 25 2340)       ED Risk Strat Scores                          SBIRT 22yo+      Flowsheet Row Most Recent Value   Initial Alcohol Screen: US AUDIT-C     1. How often do you have a drink containing alcohol? 0 Filed at: 2025   2. How many drinks containing alcohol do you have on a typical day you are drinking?  0 Filed at: 2025   3a. Male UNDER 65: How often do you have five or more drinks on one occasion? 0 Filed at: 2025   3b. FEMALE Any Age, or MALE 65+: How often do you have 4 or more drinks on one occassion? 0 Filed at: 2025   Audit-C Score 0 Filed at: 2025   RANDY: How many times in the past year have you...    Used an illegal drug or used a prescription medication for non-medical reasons? Never Filed at: 2025                            History of Present Illness       Chief Complaint   Patient presents with    Abdominal Pain     Rlq started as epigastric        Past Medical History:   Diagnosis Date    Acid reflux     COVID-19     GERD (gastroesophageal reflux disease)     Hyperlipidemia       Past Surgical History:   Procedure Laterality Date     SECTION      DILATION AND CURETTAGE OF UTERUS      2x    TONSILECTOMY AND ADNOIDECTOMY      TONSILLECTOMY      TUBAL LIGATION        Family History   Problem Relation Age of Onset    Heart disease Mother     No Known Problems Father       Social History     Tobacco Use    Smoking status: Former     Current packs/day: 0.00     Types: Cigarettes     Quit date: 2022     Years since quittin.4    Smokeless tobacco: Never   Vaping Use    Vaping status: Never Used   Substance Use Topics    Alcohol use: Yes     Alcohol/week: 2.0 standard drinks of alcohol     Types: 2 Glasses of wine per week      Comment: weekends mostly    Drug use: Never      E-Cigarette/Vaping    E-Cigarette Use Never User       E-Cigarette/Vaping Substances    Nicotine No     THC No     CBD No     Flavoring No     Other No     Unknown No       I have reviewed and agree with the history as documented.     39-year-old female complains of abdominal pain.  States it is severe.  Constant.  Feels different from previous visit to emergency department for abdominal pain in which I evaluated her.  Nausea and vomiting noted.  Some chills.  No trauma to the abdomen.  No sick contacts.        Review of Systems   Constitutional:  Positive for chills.   HENT:  Negative for congestion.    Eyes:  Negative for visual disturbance.   Respiratory:  Negative for cough, chest tightness and shortness of breath.    Cardiovascular:  Negative for chest pain.   Gastrointestinal:  Positive for abdominal pain, nausea and vomiting.   Genitourinary:  Negative for dysuria.   Skin:  Negative for rash.   Neurological:  Negative for dizziness, weakness and numbness.           Objective       ED Triage Vitals [01/04/25 2213]   Temperature Pulse Blood Pressure Respirations SpO2 Patient Position - Orthostatic VS   98.8 °F (37.1 °C) 91 156/91 18 98 % Sitting      Temp Source Heart Rate Source BP Location FiO2 (%) Pain Score    Tympanic Monitor Left arm -- 8      Vitals      Date and Time Temp Pulse SpO2 Resp BP Pain Score FACES Pain Rating User   01/05/25 0120 -- -- -- -- -- 10 - Worst Possible Pain -- KB   01/05/25 0020 -- 86 100 % 17 140/77 -- -- KB   01/04/25 2213 98.8 °F (37.1 °C) 91 98 % 18 156/91 8 -- RTM            Physical Exam  Constitutional:       General: She is in acute distress (due to pain).      Appearance: She is well-developed.   HENT:      Head: Normocephalic and atraumatic.   Eyes:      Conjunctiva/sclera: Conjunctivae normal.      Pupils: Pupils are equal, round, and reactive to light.   Cardiovascular:      Rate and Rhythm: Normal rate and regular  rhythm.      Heart sounds: Normal heart sounds.   Pulmonary:      Effort: Pulmonary effort is normal. No respiratory distress.      Breath sounds: Normal breath sounds.   Abdominal:      General: Bowel sounds are normal.      Palpations: Abdomen is soft.      Tenderness: There is generalized abdominal tenderness. There is guarding. There is no rebound.   Musculoskeletal:         General: Normal range of motion.      Cervical back: Normal range of motion and neck supple.   Skin:     General: Skin is warm and dry.      Capillary Refill: Capillary refill takes less than 2 seconds.   Neurological:      Mental Status: She is alert and oriented to person, place, and time.   Psychiatric:         Behavior: Behavior normal.         Results Reviewed       Procedure Component Value Units Date/Time    Lactic acid [[id number]]  (Normal) Collected: 01/05/25 0129    Lab Status: Final result Specimen: Blood from Arm, Left Updated: 01/05/25 0217     LACTIC ACID 0.8 mmol/L     Narrative:      Result may be elevated if tourniquet was used during collection.    Protime-INR [[id number]]  (Normal) Collected: 01/05/25 0129    Lab Status: Final result Specimen: Blood from Arm, Left Updated: 01/05/25 0156     Protime 12.9 seconds      INR 0.92    Narrative:      INR Therapeutic Range    Indication                                             INR Range      Atrial Fibrillation                                               2.0-3.0  Hypercoagulable State                                    2.0.2.3  Left Ventricular Asist Device                            2.0-3.0  Mechanical Heart Valve                                  -    Aortic(with afib, MI, embolism, HF, LA enlargement,    and/or coagulopathy)                                     2.0-3.0 (2.5-3.5)     Mitral                                                             2.5-3.5  Prosthetic/Bioprosthetic Heart Valve               2.0-3.0  Venous thromboembolism (VTE: VT, PE        2.0-3.0    APTT  [[id number]]  (Normal) Collected: 01/05/25 0129    Lab Status: Final result Specimen: Blood from Arm, Left Updated: 01/05/25 0156     PTT 25 seconds     Platelet count [[id number]]  (Normal) Collected: 01/05/25 0129    Lab Status: Final result Specimen: Blood from Arm, Left Updated: 01/05/25 0144     Platelets 279 Thousands/uL      MPV 9.5 fL     Blood culture #2 [[id number]] Collected: 01/05/25 0129    Lab Status: In process Specimen: Blood from Arm, Left Updated: 01/05/25 0141    Blood culture #1 [[id number]] Collected: 01/05/25 0129    Lab Status: In process Specimen: Blood from Arm, Right Updated: 01/05/25 0141    Procalcitonin [[id number]]     Lab Status: No result Specimen: Blood     Urine Microscopic [[id number]]  (Normal) Collected: 01/04/25 2259    Lab Status: Final result Specimen: Urine, Clean Catch Updated: 01/04/25 2348     RBC, UA 1-2 /hpf      WBC, UA 0-1 /hpf      Epithelial Cells Occasional /hpf      Bacteria, UA None Seen /hpf     CMP [[id number]]  (Abnormal) Collected: 01/04/25 2259    Lab Status: Final result Specimen: Blood from Arm, Left Updated: 01/04/25 2328     Sodium 135 mmol/L      Potassium 4.1 mmol/L      Chloride 104 mmol/L      CO2 23 mmol/L      ANION GAP 8 mmol/L      BUN 19 mg/dL      Creatinine 0.80 mg/dL      Glucose 106 mg/dL      Calcium 9.4 mg/dL      AST 10 U/L      ALT 13 U/L      Alkaline Phosphatase 49 U/L      Total Protein 7.1 g/dL      Albumin 4.3 g/dL      Total Bilirubin 0.24 mg/dL      eGFR 93 ml/min/1.73sq m     Narrative:      [employer] guidelines for Chronic Kidney Disease (CKD):     Stage 1 with normal or high GFR (GFR > 90 mL/min/1.73 square meters)    Stage 2 Mild CKD (GFR = 60-89 mL/min/1.73 square meters)    Stage 3A Moderate CKD (GFR = 45-59 mL/min/1.73 square meters)    Stage 3B Moderate CKD (GFR = 30-44 mL/min/1.73 square meters)    Stage 4 Severe CKD (GFR = 15-29 mL/min/1.73 square meters)    Stage 5 End Stage CKD (GFR <15 mL/min/1.73  square meters)  Note: GFR calculation is accurate only with a steady state creatinine    Lipase [[id number]]  (Normal) Collected: 01/04/25 2259    Lab Status: Final result Specimen: Blood from Arm, Left Updated: 01/04/25 2328     Lipase 30 u/L     UA w Reflex to Microscopic w Reflex to Culture [[id number]]  (Abnormal) Collected: 01/04/25 2259    Lab Status: Final result Specimen: Urine, Clean Catch Updated: 01/04/25 2318     Color, UA Straw     Clarity, UA Clear     Specific Gravity, UA 1.025     pH, UA 6.0     Leukocytes, UA Negative     Nitrite, UA Negative     Protein, UA Negative mg/dl      Glucose, UA Negative mg/dl      Ketones, UA Negative mg/dl      Urobilinogen, UA 0.2 E.U./dl      Bilirubin, UA Negative     Occult Blood, UA Trace-lysed    CBC and differential [[id number]]  (Abnormal) Collected: 01/04/25 2259    Lab Status: Final result Specimen: Blood from Arm, Left Updated: 01/04/25 2308     WBC 15.73 Thousand/uL      RBC 4.50 Million/uL      Hemoglobin 12.0 g/dL      Hematocrit 38.0 %      MCV 84 fL      MCH 26.7 pg      MCHC 31.6 g/dL      RDW 14.4 %      MPV 9.3 fL      Platelets 316 Thousands/uL      nRBC 0 /100 WBCs      Segmented % 77 %      Immature Grans % 0 %      Lymphocytes % 15 %      Monocytes % 7 %      Eosinophils Relative 1 %      Basophils Relative 0 %      Absolute Neutrophils 12.06 Thousands/µL      Absolute Immature Grans 0.05 Thousand/uL      Absolute Lymphocytes 2.35 Thousands/µL      Absolute Monocytes 1.08 Thousand/µL      Eosinophils Absolute 0.14 Thousand/µL      Basophils Absolute 0.05 Thousands/µL             CT abdomen pelvis with contrast   Final Interpretation by Daniel Galarza MD (01/05 0046)      Acute appendicitis      I personally discussed this study with CURTIS ALLEN at 1/5/25 12:44 AM         Workstation performed: [id number]             Procedures    ED Medication and Procedure Management   Prior to Admission Medications   Prescriptions Last Dose Informant Patient  Reported? Taking?   ALPRAZolam (XANAX) 0.5 mg tablet  Self No No   Sig: Take 1 tablet (0.5 mg total) by mouth 30 min pre-procedure Take 2nd dose 30 mins after 1st dose if still anxious.   Patient not taking: Reported on 1/20/2024   esomeprazole (NexIUM) 40 MG capsule  Self No No   Sig: Take 1 capsule (40 mg total) by mouth daily in the early morning   Patient not taking: Reported on 12/28/2023   ibuprofen (MOTRIN) 800 mg tablet  Self No No   Sig: Take 1 tablet (800 mg total) by mouth 3 (three) times a day as needed for moderate pain   ketorolac (TORADOL) 10 mg tablet  Self No No   Sig: Take 1 tablet (10 mg total) by mouth 2 (two) times a day as needed for moderate pain   Patient not taking: Reported on 12/28/2023   methylPREDNISolone 4 MG tablet therapy pack  Self No No   Sig: Use as directed on package   Patient not taking: Reported on 1/20/2024   naproxen (Naprosyn) 500 mg tablet  Self No No   Sig: Take 1 tablet (500 mg total) by mouth 2 (two) times a day with meals   Patient not taking: Reported on 1/20/2024   naproxen (Naprosyn) 500 mg tablet   No No   Sig: Take 1 tablet (500 mg total) by mouth 2 (two) times a day with meals   Patient not taking: Reported on 5/22/2024   omeprazole (PriLOSEC) 40 MG capsule  Self Yes No   Sig: Take 40 mg by mouth daily   ondansetron (Zofran ODT) 4 mg disintegrating tablet  Self No No   Sig: Take 1 tablet (4 mg total) by mouth every 6 (six) hours as needed for nausea or vomiting   Patient not taking: Reported on 12/28/2023   spironolactone (ALDACTONE) 50 mg tablet  Self Yes No   Patient not taking: Reported on 5/22/2024      Facility-Administered Medications: None     Patient's Medications   Discharge Prescriptions    No medications on file     No discharge procedures on file.  ED SEPSIS DOCUMENTATION   Time reflects when diagnosis was documented in both MDM as applicable and the Disposition within this note       Time User Action Codes Description Comment    1/5/2025 12:58 AM  Richie Hendrickson Add [K37] Appendicitis                  Richie Hendrickson MD  01/06/25 0044

## 2025-01-05 NOTE — ANESTHESIA POSTPROCEDURE EVALUATION
Post-Op Assessment Note    CV Status:  Stable  Pain Score: 9    Pain management: inadequate       Mental Status:  Arousable   Hydration Status:  Stable   PONV Controlled:  None   Airway Patency:  Patent     Post Op Vitals Reviewed: Yes    No anethesia notable event occurred.    Staff: CRNA       Last Filed PACU Vitals:  Vitals Value Taken Time   Temp 97.9    Pulse 88 01/05/25 1307   /57 01/05/25 1308   Resp 16    SpO2 97 % 01/05/25 1307   Vitals shown include unfiled device data.

## 2025-01-06 LAB
BASOPHILS # BLD AUTO: 0.01 THOUSANDS/ΜL (ref 0–0.1)
BASOPHILS NFR BLD AUTO: 0 % (ref 0–1)
EOSINOPHIL # BLD AUTO: 0.05 THOUSAND/ΜL (ref 0–0.61)
EOSINOPHIL NFR BLD AUTO: 1 % (ref 0–6)
ERYTHROCYTE [DISTWIDTH] IN BLOOD BY AUTOMATED COUNT: 14.6 % (ref 11.6–15.1)
GLUCOSE SERPL-MCNC: 126 MG/DL (ref 65–140)
GLUCOSE SERPL-MCNC: 129 MG/DL (ref 65–140)
GLUCOSE SERPL-MCNC: 135 MG/DL (ref 65–140)
HCT VFR BLD AUTO: 30.9 % (ref 34.8–46.1)
HGB BLD-MCNC: 9.9 G/DL (ref 11.5–15.4)
IMM GRANULOCYTES # BLD AUTO: 0.03 THOUSAND/UL (ref 0–0.2)
IMM GRANULOCYTES NFR BLD AUTO: 0 % (ref 0–2)
LYMPHOCYTES # BLD AUTO: 1.74 THOUSANDS/ΜL (ref 0.6–4.47)
LYMPHOCYTES NFR BLD AUTO: 17 % (ref 14–44)
MCH RBC QN AUTO: 27 PG (ref 26.8–34.3)
MCHC RBC AUTO-ENTMCNC: 32 G/DL (ref 31.4–37.4)
MCV RBC AUTO: 84 FL (ref 82–98)
MONOCYTES # BLD AUTO: 0.91 THOUSAND/ΜL (ref 0.17–1.22)
MONOCYTES NFR BLD AUTO: 9 % (ref 4–12)
NEUTROPHILS # BLD AUTO: 7.51 THOUSANDS/ΜL (ref 1.85–7.62)
NEUTS SEG NFR BLD AUTO: 73 % (ref 43–75)
NRBC BLD AUTO-RTO: 0 /100 WBCS
PLATELET # BLD AUTO: 250 THOUSANDS/UL (ref 149–390)
PMV BLD AUTO: 9.1 FL (ref 8.9–12.7)
RBC # BLD AUTO: 3.66 MILLION/UL (ref 3.81–5.12)
WBC # BLD AUTO: 10.25 THOUSAND/UL (ref 4.31–10.16)

## 2025-01-06 PROCEDURE — 82948 REAGENT STRIP/BLOOD GLUCOSE: CPT

## 2025-01-06 PROCEDURE — 99024 POSTOP FOLLOW-UP VISIT: CPT | Performed by: SURGERY

## 2025-01-06 PROCEDURE — 85025 COMPLETE CBC W/AUTO DIFF WBC: CPT | Performed by: SURGERY

## 2025-01-06 RX ORDER — OXYCODONE HYDROCHLORIDE 5 MG/1
5 TABLET ORAL EVERY 4 HOURS PRN
Refills: 0 | Status: DISCONTINUED | OUTPATIENT
Start: 2025-01-06 | End: 2025-01-07 | Stop reason: HOSPADM

## 2025-01-06 RX ORDER — ACETAMINOPHEN 325 MG/1
650 TABLET ORAL EVERY 6 HOURS PRN
Status: DISCONTINUED | OUTPATIENT
Start: 2025-01-06 | End: 2025-01-07 | Stop reason: HOSPADM

## 2025-01-06 RX ORDER — KETOROLAC TROMETHAMINE 30 MG/ML
15 INJECTION, SOLUTION INTRAMUSCULAR; INTRAVENOUS EVERY 6 HOURS SCHEDULED
Status: DISCONTINUED | OUTPATIENT
Start: 2025-01-06 | End: 2025-01-06

## 2025-01-06 RX ORDER — OXYCODONE HYDROCHLORIDE 10 MG/1
10 TABLET ORAL EVERY 4 HOURS PRN
Refills: 0 | Status: DISCONTINUED | OUTPATIENT
Start: 2025-01-06 | End: 2025-01-07 | Stop reason: HOSPADM

## 2025-01-06 RX ORDER — PANTOPRAZOLE SODIUM 40 MG/10ML
40 INJECTION, POWDER, LYOPHILIZED, FOR SOLUTION INTRAVENOUS
Status: DISCONTINUED | OUTPATIENT
Start: 2025-01-06 | End: 2025-01-07

## 2025-01-06 RX ORDER — KETOROLAC TROMETHAMINE 30 MG/ML
15 INJECTION, SOLUTION INTRAMUSCULAR; INTRAVENOUS EVERY 6 HOURS SCHEDULED
Status: DISCONTINUED | OUTPATIENT
Start: 2025-01-06 | End: 2025-01-07 | Stop reason: HOSPADM

## 2025-01-06 RX ADMIN — KETOROLAC TROMETHAMINE 15 MG: 30 INJECTION, SOLUTION INTRAMUSCULAR; INTRAVENOUS at 14:17

## 2025-01-06 RX ADMIN — METRONIDAZOLE 500 MG: 500 TABLET ORAL at 06:19

## 2025-01-06 RX ADMIN — PANTOPRAZOLE SODIUM 40 MG: 40 INJECTION, POWDER, FOR SOLUTION INTRAVENOUS at 08:10

## 2025-01-06 RX ADMIN — LORAZEPAM 0.5 MG: 2 INJECTION INTRAMUSCULAR; INTRAVENOUS at 23:24

## 2025-01-06 RX ADMIN — KETOROLAC TROMETHAMINE 15 MG: 30 INJECTION, SOLUTION INTRAMUSCULAR; INTRAVENOUS at 08:10

## 2025-01-06 RX ADMIN — SODIUM CHLORIDE 100 ML/HR: 0.9 INJECTION, SOLUTION INTRAVENOUS at 06:22

## 2025-01-06 RX ADMIN — KETOROLAC TROMETHAMINE 15 MG: 30 INJECTION, SOLUTION INTRAMUSCULAR; INTRAVENOUS at 23:24

## 2025-01-06 RX ADMIN — CEFAZOLIN SODIUM 2000 MG: 2 SOLUTION INTRAVENOUS at 01:03

## 2025-01-06 RX ADMIN — LORAZEPAM 0.5 MG: 2 INJECTION INTRAMUSCULAR; INTRAVENOUS at 01:27

## 2025-01-06 NOTE — ASSESSMENT & PLAN NOTE
40yo female now POD1 s/p laparoscopic appendectomy   -has not eaten breakfast yet, has been out of bed, urinating, some abdominal soreness   -abdomen is flat, soft, TTP around incisions   -afebrile, VSS   -WBC 10.2 (15.7)   -BMP pending    Plan:  -will re-evaluate patient later today for discharge; patient has concerns about returning home today due to having children and a new dog  -continue IVF  -surgical soft diet  -pain medication adjusted  -encourage OOB  -trend labs and vitals  -will discuss with Dr Leija

## 2025-01-06 NOTE — PLAN OF CARE
Problem: PAIN - ADULT  Goal: Verbalizes/displays adequate comfort level or baseline comfort level  Description: Interventions:  - Encourage patient to monitor pain and request assistance  - Assess pain using appropriate pain scale  - Administer analgesics based on type and severity of pain and evaluate response  - Implement non-pharmacological measures as appropriate and evaluate response  - Consider cultural and social influences on pain and pain management  - Notify physician/advanced practitioner if interventions unsuccessful or patient reports new pain  Outcome: Progressing     Problem: INFECTION - ADULT  Goal: Absence or prevention of progression during hospitalization  Description: INTERVENTIONS:  - Assess and monitor for signs and symptoms of infection  - Monitor lab/diagnostic results  - Monitor all insertion sites, i.e. indwelling lines, tubes, and drains  - Monitor endotracheal if appropriate and nasal secretions for changes in amount and color  - [place] appropriate cooling/warming therapies per order  - Administer medications as ordered  - Instruct and encourage patient and family to use good hand hygiene technique  - Identify and instruct in appropriate isolation precautions for identified infection/condition  Outcome: Progressing  Goal: Absence of fever/infection during neutropenic period  Description: INTERVENTIONS:  - Monitor WBC    Outcome: Progressing     Problem: SAFETY ADULT  Goal: Patient will remain free of falls  Description: INTERVENTIONS:  - Educate patient/family on patient safety including physical limitations  - Instruct patient to call for assistance with activity   - Consult OT/PT to assist with strengthening/mobility   - Keep Call bell within reach  - Keep bed low and locked with side rails adjusted as appropriate  - Keep care items and personal belongings within reach  - Initiate and maintain comfort rounds  - Make Fall Risk Sign visible to staff  - Offer Toileting every 1 Hours,  in advance of need  - Initiate/Maintain bed alarm  - Obtain necessary fall risk management equipment: bed alarm  - Apply yellow socks and bracelet for high fall risk patients  - Consider moving patient to room near nurses station  Outcome: Progressing  Goal: Maintain or return to baseline ADL function  Description: INTERVENTIONS:  -  Assess patient's ability to carry out ADLs; assess patient's baseline for ADL function and identify physical deficits which impact ability to perform ADLs (bathing, care of mouth/teeth, toileting, grooming, dressing, etc.)  - Assess/evaluate cause of self-care deficits   - Assess range of motion  - Assess patient's mobility; develop plan if impaired  - Assess patient's need for assistive devices and provide as appropriate  - Encourage maximum independence but intervene and supervise when necessary  - Involve family in performance of ADLs  - Assess for home care needs following discharge   - Consider OT consult to assist with ADL evaluation and planning for discharge  - Provide patient education as appropriate  Outcome: Progressing  Goal: Maintains/Returns to pre admission functional level  Description: INTERVENTIONS:  - Perform AM-PAC 6 Click Basic Mobility/ Daily Activity assessment daily.  - Set and communicate daily mobility goal to care team and patient/family/caregiver.   - Collaborate with rehabilitation services on mobility goals if consulted  - Perform Range of Motion 3 times a day.  - Reposition patient every 2 hours.  - Dangle patient 3 times a day  - Stand patient 3 times a day  - Ambulate patient 3 times a day  - Out of bed to chair 3 times a day   - Out of bed for meals 3 times a day  - Out of bed for toileting  - Record patient progress and toleration of activity level   Outcome: Progressing     Problem: DISCHARGE PLANNING  Goal: Discharge to home or other facility with appropriate resources  Description: INTERVENTIONS:  - Identify barriers to discharge w/patient and  caregiver  - Arrange for needed discharge resources and transportation as appropriate  - Identify discharge learning needs (meds, wound care, etc.)  - Arrange for interpretive services to assist at discharge as needed  - Refer to Case Management Department for coordinating discharge planning if the patient needs post-hospital services based on physician/advanced practitioner order or complex needs related to functional status, cognitive ability, or social support system  Outcome: Progressing     Problem: Knowledge Deficit  Goal: Patient/family/caregiver demonstrates understanding of disease process, treatment plan, medications, and discharge instructions  Description: Complete learning assessment and assess knowledge base.  Interventions:  - Provide teaching at level of understanding  - Provide teaching via preferred learning methods  Outcome: Progressing

## 2025-01-06 NOTE — PLAN OF CARE
Problem: PAIN - ADULT  Goal: Verbalizes/displays adequate comfort level or baseline comfort level  Description: Interventions:  - Encourage patient to monitor pain and request assistance  - Assess pain using appropriate pain scale  - Administer analgesics based on type and severity of pain and evaluate response  - Implement non-pharmacological measures as appropriate and evaluate response  - Consider cultural and social influences on pain and pain management  - Notify physician/advanced practitioner if interventions unsuccessful or patient reports new pain  Outcome: Progressing     Problem: INFECTION - ADULT  Goal: Absence of fever/infection during neutropenic period  Description: INTERVENTIONS:  - Monitor WBC    Outcome: Progressing     Problem: SAFETY ADULT  Goal: Maintains/Returns to pre admission functional level  Problem: Knowledge Deficit  Goal: Patient/family/caregiver demonstrates understanding of disease process, treatment plan, medications, and discharge instructions  Description: Complete learning assessment and assess knowledge base.  Interventions:  - Provide teaching at level of understanding  - Provide teaching via preferred learning methods  Outcome: Progressing

## 2025-01-06 NOTE — UTILIZATION REVIEW
Initial Clinical Review    WAS OBSERVATION 1/5/25 CONVERTED TO INPATIENT ADMISSION 1/6/25 DUE TO CONTINUED STAY REQUIRED TO CARE FOR PATIENT WITH ACUTE APPENDICITIS/POST OP PAIN  .     Admission: Date/Time/Statement:   Admission Orders (From admission, onward)       Ordered        01/06/25 1506  INPATIENT ADMISSION  Once            01/05/25 0059  Place in Observation  Once                          Orders Placed This Encounter   Procedures                          INPATIENT ADMISSION     Standing Status:   Standing     Number of Occurrences:   1     Level of Care:   Med Surg [16]     Estimated length of stay:   More than 2 Midnights     Certification:   I certify that inpatient services are medically necessary for this patient for a duration of greater than two midnights. See H&P and MD Progress Notes for additional information about the patient's course of treatment.     ED Arrival Information       Expected   -    Arrival   1/4/2025 22:01    Acuity   Urgent              Means of arrival   Walk-In    Escorted by   Self    Service   Surgery-General    Admission type   Emergency              Arrival complaint   right side abdominal pain             Chief Complaint   Patient presents with    Abdominal Pain     Rlq started as epigastric        1/4/25- 1/5/25 Initial Presentation: 39 y.o. female presents to the ED due to severe constant abdominal pain, nausea and vomiting and chills. Initially pain started as periumbilical and then radiated down to her RLQ abdomen. The pain became more severe and she also reportsd bloating with some diarrhea, moving makes the pain worse and nothing thus far has improved the pain. Exam: guarding and pain with palpation to abdomen. In acute distress due to the pain. Abnormal labs/imaging: CT A/P reveals acute appendicitis, WBC 15.73, AST 10. In the ED pt given IVF's of NS 1 liter, IV flagyl, IV Cefazolin and IV dilaudid for pain.Pt reports muscles became tight in her throat after receiving  IV dilaudid, listed as an allergy.  Plan: admit to observation for acute appendicitis, general surgery consult, NPO, IVF's, IV pain control.       Date: 1/5/25   Day 2: General Surgery consult: Exam: mildly distended but soft, positive Rovsing's, positive tenderness at McBurney's point, mild rebound, cannot appreciate small umbilical hernia that was seen on CT scan due to patient body habitus . /91, HR91, temp 98.8 F. Plan: NPO, to OR for laparoscopic appendectomy. Continue  IVF's.     1/5/25 OP note Procedure(s):  APPENDECTOMY LAPAROSCOPIC    Operative Findings:  The appendix was elongated, hyperemic, and enlarged.  There was no gross purulence.  There was no perforation.    CONVERTED TO IP 1/6/25.     1/6/25 General surgery: POD1 s/p laparoscopic appendectomy. Exam: Afebrile Temp 98.7 F, /85, HR 80, RR 18. On 2 liters oxygen, SPO2 95%, UOP 1053 ml. c/o abdominal soreness, + flatus, has been voiding, ambulated, no N/V. abdomen is flat, soft, TTP around incisions. WBC 10.2., HGB 9.9 today. Pt expressing concerns regarding being dc today due to having children at home and a new dog. IV lorazepam x1 dose given today. IV TORADOL Q6.  Plan: continue IVF's of NS at 100 ml/hr, IV PPI Q24, surgical soft diet, pain medications PRN, Encourage OOB, ambulation, trend labs and vitals.       ED Treatment-Medication Administration from 01/04/2025 2201 to 01/05/2025 0244         Date/Time Order Dose Route Action     01/04/2025 2340 iohexol (OMNIPAQUE) 350 MG/ML injection (SINGLE-DOSE) 100 mL 100 mL Intravenous Given     01/05/2025 0132 sodium chloride 0.9 % bolus 1,000 mL 1,000 mL Intravenous New Bag     01/05/2025 0131 ceFAZolin (ANCEF) IVPB (premix in dextrose) 2,000 mg 50 mL 2,000 mg Intravenous New Bag     01/05/2025 0130 metroNIDAZOLE (FLAGYL) tablet 500 mg 500 mg Oral Given     01/05/2025 0120 HYDROmorphone (DILAUDID) injection 0.5 mg 0.5 mg Intravenous Given            Scheduled Medications:  heparin (porcine),  5,000 Units, Subcutaneous, Q12H HEATHER  ketorolac, 15 mg, Intravenous, Q6H HEATHER  pantoprazole, 40 mg, Intravenous, Q24H HEATHER      Continuous IV Infusions:  sodium chloride, 100 mL/hr, Intravenous, Continuous      PRN Meds:  acetaminophen, 650 mg, Oral, Q6H PRN  LORazepam, 0.5 mg, Intravenous, Q6H PRN x1 dose 1/6/25   ondansetron, 4 mg, Intravenous, Q6H PRN x3 doses 1/5/25   oxyCODONE, 10 mg, Oral, Q4H PRN  oxyCODONE, 5 mg, Oral, Q4H PRN      ED Triage Vitals [01/04/25 2213]   Temperature Pulse Respirations Blood Pressure SpO2 Pain Score   98.8 °F (37.1 °C) 91 18 156/91 98 % 8     Weight (last 2 days)       Date/Time Weight    01/05/25 0300 78 (171.96)            Vital Signs (last 3 days)       Date/Time Temp Pulse Resp BP MAP (mmHg) SpO2 Calculated FIO2 (%) - Nasal Cannula O2 Flow Rate (L/min) Nasal Cannula O2 Flow Rate (L/min) O2 Device Cardiac (WDL) Patient Position - Orthostatic VS [place] Coma Scale Score Pain    01/06/25 1417 -- -- -- -- -- -- -- -- -- -- -- -- -- 5    01/06/25 0900 -- -- -- -- -- 96 % -- -- -- None (Room air) -- -- 15 --    01/06/25 0810 -- -- -- -- -- -- -- -- -- -- -- -- -- 5    01/06/25 07:06:05 98.7 °F (37.1 °C) 80 18 130/85 100 95 % -- -- -- -- -- Lying -- --    01/06/25 0100 -- -- -- -- -- -- -- -- -- -- -- -- 15 No Pain    01/05/25 22:42:36 98.6 °F (37 °C) 77 -- 136/87 103 95 % -- -- -- -- -- -- -- --    01/05/25 2202 -- -- -- -- -- -- -- -- -- -- -- -- -- 5    01/05/25 1900 -- 76 -- -- -- 97 % -- -- -- -- -- -- -- --    01/05/25 18:37:58 98.3 °F (36.8 °C) 77 -- 122/83 96 92 % -- -- -- -- -- -- -- --    01/05/25 1656 -- -- -- -- -- -- -- -- -- -- -- -- -- 8    01/05/25 16:19:10 98.8 °F (37.1 °C) 80 -- 125/86 99 97 % -- -- -- -- -- -- -- --    01/05/25 14:56:04 -- 73 -- 114/67 83 98 % -- -- -- -- -- -- -- --    01/05/25 14:30:40 98.3 °F (36.8 °C) 84 -- 102/64 77 97 % -- -- -- -- -- -- -- --    01/05/25 1410 97.8 °F (36.6 °C) 75 16 98/57 73 97 % 28 -- 2 L/min Nasal cannula WDL -- -- 7     01/05/25 1355 -- 71 16 111/61 78 96 % 28 -- 2 L/min Nasal cannula WDL -- -- 7    01/05/25 1340 -- 64 16 121/64 86 99 % 32 -- 3 L/min Nasal cannula WDL -- -- 8    01/05/25 1339 -- -- -- -- -- -- -- -- -- -- -- -- -- 8 01/05/25 1325 -- 73 16 97/47 68 100 % -- 4 L/min -- Simple mask WDL -- -- 9    01/05/25 1310 97.9 °F (36.6 °C) 84 16 100/44 63 100 % -- 4 L/min -- Simple mask WDL -- -- 9 01/05/25 1153 -- -- -- -- -- -- -- -- -- -- -- -- -- 8 01/05/25 0800 -- -- -- -- -- -- -- -- -- -- -- -- 15 2    01/05/25 07:42:02 98 °F (36.7 °C) 80 18 100/62 75 96 % -- -- -- None (Room air) -- Lying -- --    01/05/25 0503 -- -- -- -- -- -- -- -- -- -- -- -- -- 10 - Worst Possible Pain    01/05/25 0412 -- -- -- -- -- 96 % -- -- -- None (Room air) -- -- 15 --    01/05/25 0248 -- -- -- -- -- -- -- -- -- -- -- -- -- 10 - Worst Possible Pain    01/05/25 0120 -- -- -- -- -- -- -- -- -- -- -- -- -- 10 - Worst Possible Pain    01/05/25 0020 -- 86 17 140/77 101 100 % -- -- -- None (Room air) -- Lying -- --    01/04/25 2243 -- -- -- -- -- -- -- -- -- -- -- -- 15 --    01/04/25 2213 98.8 °F (37.1 °C) 91 18 156/91 -- 98 % -- -- -- -- -- Sitting -- 8              Pertinent Labs/Diagnostic Test Results:   Radiology:  CT abdomen pelvis with contrast   Final Interpretation by Daniel Galarza MD (01/05 0046)      Acute appendicitis      I personally discussed this study with CURTIS ALLEN at 1/5/25 12:44 AM         Workstation performed: [id number]               Results from last 7 days   Lab Units 01/06/25  0627 01/05/25  0129 01/04/25  2259   WBC Thousand/uL 10.25*  --  15.73*   HEMOGLOBIN g/dL 9.9*  --  12.0   HEMATOCRIT % 30.9*  --  38.0   PLATELETS Thousands/uL 250 279 316   TOTAL NEUT ABS Thousands/µL 7.51  --  12.06*         Results from last 7 days   Lab Units 01/04/25  2259   SODIUM mmol/L 135   POTASSIUM mmol/L 4.1   CHLORIDE mmol/L 104   CO2 mmol/L 23   ANION GAP mmol/L 8   BUN mg/dL 19   CREATININE mg/dL 0.80   EGFR ml/min/1.73sq  m 93   CALCIUM mg/dL 9.4     Results from last 7 days   Lab Units 01/04/25  2259   AST U/L 10*   ALT U/L 13   ALK PHOS U/L 49   TOTAL PROTEIN g/dL 7.1   ALBUMIN g/dL 4.3   TOTAL BILIRUBIN mg/dL 0.24         Results from last 7 days   Lab Units 01/04/25  2259   GLUCOSE RANDOM mg/dL 106             Results from last 7 days   Lab Units 01/05/25  0129   PROTIME seconds 12.9   INR  0.92   PTT seconds 25         Results from last 7 days   Lab Units 01/04/25  2259   PROCALCITONIN ng/ml <0.05     Results from last 7 days   Lab Units 01/05/25  0129   LACTIC ACID mmol/L 0.8         Results from last 7 days   Lab Units 01/04/25  2259   LIPASE u/L 30               Results from last 7 days   Lab Units 01/04/25  2259   CLARITY UA  Clear   COLOR UA  Straw   SPEC GRAV UA  1.025   PH UA  6.0   GLUCOSE UA mg/dl Negative   KETONES UA mg/dl Negative   BLOOD UA  Trace-lysed*   PROTEIN UA mg/dl Negative   NITRITE UA  Negative   BILIRUBIN UA  Negative   UROBILINOGEN UA E.U./dl 0.2   LEUKOCYTES UA  Negative   WBC UA /hpf 0-1   RBC UA /hpf 1-2   BACTERIA UA /hpf None Seen   EPITHELIAL CELLS WET PREP /hpf Occasional                   Results from last 7 days   Lab Units 01/05/25  0129   BLOOD CULTURE  No Growth at 24 hrs.  No Growth at 24 hrs.                   Past Medical History:   Diagnosis Date    Acid reflux     COVID-19     GERD (gastroesophageal reflux disease)     Hyperlipidemia      Present on Admission:   Acute appendicitis with localized peritonitis, without perforation, abscess, or gangrene      Admitting Diagnosis: Appendicitis [K37]  Abdominal pain [R10.9]  Age/Sex: 39 y.o. female    Network Utilization Review Department  ATTENTION: Please call with any questions or concerns to [phone] and carefully listen to the prompts so that you are directed to the right person. All voicemails are confidential.   For Discharge needs, contact Care Management DC Support Team at [phone] opt. 2  Send all requests for admission  clinical reviews, approved or denied determinations and any other requests to dedicated fax number below belonging to the campus where the patient is receiving treatment. List of dedicated fax numbers for the Facilities:  FACILITY NAME UR FAX NUMBER   ADMISSION DENIALS (Administrative/Medical Necessity) [phone]   DISCHARGE SUPPORT TEAM (NETWORK) [phone]   PARENT CHILD HEALTH (Maternity/NICU/Pediatrics) [phone]   [facility] [phone]   [facility] [phone]   [facility] [phone]   [facility] [phone]   [facility] [phone]   [facility] [phone]   [facility] [phone]   [facility] [phone]   [facility] [phone]   [facility] [phone]   [facility] [phone]   [facility] [phone]

## 2025-01-07 VITALS
WEIGHT: 171.96 LBS | OXYGEN SATURATION: 97 % | SYSTOLIC BLOOD PRESSURE: 122 MMHG | BODY MASS INDEX: 30.47 KG/M2 | RESPIRATION RATE: 16 BRPM | HEART RATE: 73 BPM | TEMPERATURE: 98.2 F | HEIGHT: 63 IN | DIASTOLIC BLOOD PRESSURE: 80 MMHG

## 2025-01-07 LAB
ANION GAP SERPL CALCULATED.3IONS-SCNC: 6 MMOL/L (ref 4–13)
BASOPHILS # BLD AUTO: 0.04 THOUSANDS/ΜL (ref 0–0.1)
BASOPHILS NFR BLD AUTO: 0 % (ref 0–1)
BUN SERPL-MCNC: 10 MG/DL (ref 5–25)
CALCIUM SERPL-MCNC: 8.4 MG/DL (ref 8.4–10.2)
CHLORIDE SERPL-SCNC: 108 MMOL/L (ref 96–108)
CO2 SERPL-SCNC: 23 MMOL/L (ref 21–32)
CREAT SERPL-MCNC: 0.69 MG/DL (ref 0.6–1.3)
EOSINOPHIL # BLD AUTO: 0.1 THOUSAND/ΜL (ref 0–0.61)
EOSINOPHIL NFR BLD AUTO: 1 % (ref 0–6)
ERYTHROCYTE [DISTWIDTH] IN BLOOD BY AUTOMATED COUNT: 15.1 % (ref 11.6–15.1)
GFR SERPL CREATININE-BSD FRML MDRD: 110 ML/MIN/1.73SQ M
GLUCOSE SERPL-MCNC: 127 MG/DL (ref 65–140)
HCT VFR BLD AUTO: 32.6 % (ref 34.8–46.1)
HGB BLD-MCNC: 10.3 G/DL (ref 11.5–15.4)
IMM GRANULOCYTES # BLD AUTO: 0.02 THOUSAND/UL (ref 0–0.2)
IMM GRANULOCYTES NFR BLD AUTO: 0 % (ref 0–2)
LYMPHOCYTES # BLD AUTO: 3.05 THOUSANDS/ΜL (ref 0.6–4.47)
LYMPHOCYTES NFR BLD AUTO: 34 % (ref 14–44)
MCH RBC QN AUTO: 26.9 PG (ref 26.8–34.3)
MCHC RBC AUTO-ENTMCNC: 31.6 G/DL (ref 31.4–37.4)
MCV RBC AUTO: 85 FL (ref 82–98)
MONOCYTES # BLD AUTO: 0.59 THOUSAND/ΜL (ref 0.17–1.22)
MONOCYTES NFR BLD AUTO: 7 % (ref 4–12)
NEUTROPHILS # BLD AUTO: 5.28 THOUSANDS/ΜL (ref 1.85–7.62)
NEUTS SEG NFR BLD AUTO: 58 % (ref 43–75)
NRBC BLD AUTO-RTO: 0 /100 WBCS
PLATELET # BLD AUTO: 256 THOUSANDS/UL (ref 149–390)
PMV BLD AUTO: 9.1 FL (ref 8.9–12.7)
POTASSIUM SERPL-SCNC: 4 MMOL/L (ref 3.5–5.3)
RBC # BLD AUTO: 3.83 MILLION/UL (ref 3.81–5.12)
SODIUM SERPL-SCNC: 137 MMOL/L (ref 135–147)
WBC # BLD AUTO: 9.08 THOUSAND/UL (ref 4.31–10.16)

## 2025-01-07 PROCEDURE — 99024 POSTOP FOLLOW-UP VISIT: CPT

## 2025-01-07 PROCEDURE — 88304 TISSUE EXAM BY PATHOLOGIST: CPT | Performed by: PATHOLOGY

## 2025-01-07 PROCEDURE — 85025 COMPLETE CBC W/AUTO DIFF WBC: CPT

## 2025-01-07 PROCEDURE — 80048 BASIC METABOLIC PNL TOTAL CA: CPT

## 2025-01-07 RX ORDER — OXYCODONE HYDROCHLORIDE 5 MG/1
5 TABLET ORAL EVERY 4 HOURS PRN
Qty: 12 TABLET | Refills: 0 | Status: SHIPPED | OUTPATIENT
Start: 2025-01-07 | End: 2025-01-17

## 2025-01-07 NOTE — PLAN OF CARE
Problem: PAIN - ADULT  Goal: Verbalizes/displays adequate comfort level or baseline comfort level  Description: Interventions:  - Encourage patient to monitor pain and request assistance  - Assess pain using appropriate pain scale  - Administer analgesics based on type and severity of pain and evaluate response  - Implement non-pharmacological measures as appropriate and evaluate response  - Consider cultural and social influences on pain and pain management  - Notify physician/advanced practitioner if interventions unsuccessful or patient reports new pain  Outcome: Adequate for Discharge     Problem: INFECTION - ADULT  Goal: Absence or prevention of progression during hospitalization  Description: INTERVENTIONS:  - Assess and monitor for signs and symptoms of infection  - Monitor lab/diagnostic results  - Monitor all insertion sites, i.e. indwelling lines, tubes, and drains  - Monitor endotracheal if appropriate and nasal secretions for changes in amount and color  - [place] appropriate cooling/warming therapies per order  - Administer medications as ordered  - Instruct and encourage patient and family to use good hand hygiene technique  - Identify and instruct in appropriate isolation precautions for identified infection/condition  Outcome: Adequate for Discharge     Problem: SAFETY ADULT  Goal: Patient will remain free of falls  Description: INTERVENTIONS:  - Educate patient/family on patient safety including physical limitations  - Instruct patient to call for assistance with activity   - Consult OT/PT to assist with strengthening/mobility   - Keep Call bell within reach  - Keep bed low and locked with side rails adjusted as appropriate  - Keep care items and personal belongings within reach  - Initiate and maintain comfort rounds  - Make Fall Risk Sign visible to staff  - Obtain necessary fall risk management equipment: non-slip socks  - Apply yellow socks and bracelet for high fall risk patients  -  Consider moving patient to room near nurses station  Outcome: Adequate for Discharge  Goal: Maintain or return to baseline ADL function  Description: INTERVENTIONS:  -  Assess patient's ability to carry out ADLs; assess patient's baseline for ADL function and identify physical deficits which impact ability to perform ADLs (bathing, care of mouth/teeth, toileting, grooming, dressing, etc.)  - Assess/evaluate cause of self-care deficits   - Assess range of motion  - Assess patient's mobility; develop plan if impaired  - Assess patient's need for assistive devices and provide as appropriate  - Encourage maximum independence but intervene and supervise when necessary  - Involve family in performance of ADLs  - Assess for home care needs following discharge   - Consider OT consult to assist with ADL evaluation and planning for discharge  - Provide patient education as appropriate  Outcome: Adequate for Discharge  Goal: Maintains/Returns to pre admission functional level  Description: INTERVENTIONS:  - Perform AM-PAC 6 Click Basic Mobility/ Daily Activity assessment daily.  - Set and communicate daily mobility goal to care team and patient/family/caregiver.   - Collaborate with rehabilitation services on mobility goals if consulted  - Perform Range of Motion 3 times a day.  - Ambulate patient 3 times a day  - Out of bed to chair 3 times a day for meals  - Out of bed for toileting  - Record patient progress and toleration of activity level   Outcome: Adequate for Discharge     Problem: DISCHARGE PLANNING  Goal: Discharge to home or other facility with appropriate resources  Description: INTERVENTIONS:  - Identify barriers to discharge w/patient and caregiver  - Arrange for needed discharge resources and transportation as appropriate  - Identify discharge learning needs (meds, wound care, etc.)  - Arrange for interpretive services to assist at discharge as needed  - Refer to Case Management Department for coordinating  discharge planning if the patient needs post-hospital services based on physician/advanced practitioner order or complex needs related to functional status, cognitive ability, or social support system  Outcome: Adequate for Discharge     Problem: Knowledge Deficit  Goal: Patient/family/caregiver demonstrates understanding of disease process, treatment plan, medications, and discharge instructions  Description: Complete learning assessment and assess knowledge base.  Interventions:  - Provide teaching at level of understanding  - Provide teaching via preferred learning methods  Outcome: Adequate for Discharge

## 2025-01-07 NOTE — DISCHARGE INSTR - AVS FIRST PAGE
DISCHARGE INSTRUCTIONS:   Light activity   No heavy lifting, limit lifting to 15-20 pounds for 2 weeks   No limitations for diet   Please take medications as prescribed   Please take acetaminophen/ibuprofen scheduled every 4-6 hours for pain  Please take narcotic pain medication as needed for severe pain   Do not drive if taking narcotic pain medication     If surgical glue is over your incisions, this will peel off on its own  If steri strips are over your incisions, these will fall off on their own   Please do not remove glue or steri strips prematurely   Please remove dressings today and then you may shower  You may shower but do not scrub or submerge incisions    Please notify general surgery office if you experience:  Fever over 101.5F  Persistent nausea or vomiting  Severe uncontrolled pain  Redness, tenderness, or signs of infection near incisions (pain, swelling, redness, yellow/green drainage)  Active or persistent bleeding from incisions  Chest pain, shortness of breath     You have a follow up appointment scheduled 1/20/25 in the general surgery office, please keep this appointment  Please call our office with any additional questions or concerns

## 2025-01-07 NOTE — NURSING NOTE
"1940- Pt complains of \"feeling off\", clammy, and heart racing, and starting to get anxious. /79, , Resp 20, temp 99, blood sugar 126. Surgery made aware and had no concerns. Pt was offered Ativan to help with anxiety but pt declined at this time stating it is the only thing that helps her sleep and that she doesn't want it right now.     2120- Pt stated that her heart is racing and she is concerned that something is wrong. Surgery called  and made aware. Spoke to Dr. Mckinnon. Surgery not concerned stating that pain is normal following surgery and that pt has been anxious.     2324- Pts HR down to 80, Ativan requested for anxiety. Pt updated from RN on what surgeon had stated. Ativan and Toradol given through IV, flushed with no issues. Pt then stated that IV was burning. Second RN evaluated IV, flushed with no issues, redness or swelling. Pt stated that the IV continued to burn and said to take out IV. Pt educated by RN that we would need to place a second IV to which patient refused. Pt educated by RN that the ordered fluids are important and pt stated that they did not need the fluids that she is going home tomorrow and will drink water. RN removed IV to fulfill pts request, catheter intact. Dr Mckinnon made aware of IV removal and stopping of fluids.       "

## 2025-01-07 NOTE — DISCHARGE SUMMARY
"Discharge Summary - Surgery-General   Name: Cristal Devine 39 y.o. female I MRN: [id number]  Unit/Bed#: -01 I Date of Admission: 1/4/2025   Date of Service: 1/7/2025 I Hospital Day: 1    Admission Date:   Admission Orders (From admission, onward)       Ordered        01/06/25 1506  INPATIENT ADMISSION  Once            01/05/25 0059  Place in Observation  Once                           Discharge Date:  1/7/25    Admitting Diagnosis: Appendicitis [K37]  Abdominal pain [R10.9]  Discharge Diagnosis: Appendicitis [K37]  Abdominal pain [R10.9]      Procedures Performed: 1/5/25 laparoscopic appendectomy with Dr Cally Mckinnon    HPI: Per Dr Cally Mckinnon H&P 1/5: \"The patient is a 39-year-old female who states that about 2:00 on Saturday she started to get periumbilical discomfort. She states that it then by about 4:00 it radiated down to her right lower quadrant and by 6 PM yesterday she had severe right lower quadrant discomfort. She did develop nausea, but no vomiting. She has felt that her abdomen was significantly bloated. She did have some diarrhea as well. She has never had similar pain in the past. Moving makes the pain worse. Nothing makes the pain better. She tried to eat a few bites of chicken last night at 6:00, but could not keep it down. She denies any change in her bowel habits. Her last menses was December 17 and it was normal. She denies any urinary symptomatology. I discussed her Dilaudid allergy which was on the chart and there seem to be a little confusion this morning. The patient states that she felt that her muscles got tight in her throat but she did not get any shortness of breath or rash at the time. I will place Dilaudid back on her allergy list with that symptomatology and will not order.\"     Hospital Course: Patient presented to the [facility] ED 1/4 with abdominal pain. CT showed acute appendicitis. Patient was admitted to the general surgery service and started on IV abx. Patient was " taken to the OR 1/5 for laparoscopic appendectomy. Patient's diet was advanced the following. Patient was having issues with pain control yesterday morning and was kept overnight to address that issue. This morning patient is feeling well. She has been out of bed. She took a shower. Her lab work was normal. She is tolerating a regular diet. Patient feels well enough to go home. A follow up appointment has been arranged. Patient is in agreement with the discharge and follow up plan.     Physical Exam  Constitutional:       Appearance: Normal appearance.   HENT:      Head: Normocephalic and atraumatic.      Nose: Nose normal.      Mouth/Throat:      Mouth: Mucous membranes are moist.      Pharynx: Oropharynx is clear.   Eyes:      Conjunctiva/sclera: Conjunctivae normal.      Pupils: Pupils are equal, round, and reactive to light.   Cardiovascular:      Rate and Rhythm: Normal rate.   Pulmonary:      Effort: Pulmonary effort is normal.   Abdominal:      General: Abdomen is flat.      Palpations: Abdomen is soft.      Tenderness: There is abdominal tenderness.      Comments: Incisions c/d/i   Musculoskeletal:         General: Normal range of motion.   Skin:     General: Skin is warm and dry.   Neurological:      General: No focal deficit present.      Mental Status: She is alert.   Psychiatric:         Mood and Affect: Mood normal.          Condition at Discharge: good     Discharge instructions/Information to patient and family:   See after visit summary for information provided to patient and family.      Provisions for Follow-Up Care:  See after visit summary for information related to follow-up care and any pertinent home health orders.      Disposition: Home        Planned Readmission: No    Discharge Statement:  I have spent a total time of 20 minutes in caring for this patient on the day of the visit/encounter.     Discharge Medications:  See after visit summary for reconciled discharge medications provided to  patient and family.      Irina Augustin PA-C

## 2025-01-07 NOTE — NURSING NOTE
Patient is complaining of heart racing, slight dizziness. Vital signs 98 oxygen, heart rate , bp 134/74, RR 24, blood glucose 135.   Patient has not eaten since Saturday and had some mashed potatoes. Dr. Mckinnon notified. RN gave chicken broth, saltine crackers and jello. Patient ate and said she felt better about 20  minutes later and vitals stable.

## 2025-01-07 NOTE — NURSING NOTE
Patient discharged to home, no needs. Reviewed discharge instructions with patient and friend. All questions/concerns addressed prior to d/c.

## 2025-01-07 NOTE — PLAN OF CARE
Problem: PAIN - ADULT  Goal: Verbalizes/displays adequate comfort level or baseline comfort level  Description: Interventions:  - Encourage patient to monitor pain and request assistance  - Assess pain using appropriate pain scale  - Administer analgesics based on type and severity of pain and evaluate response  - Implement non-pharmacological measures as appropriate and evaluate response  - Consider cultural and social influences on pain and pain management  - Notify physician/advanced practitioner if interventions unsuccessful or patient reports new pain  Outcome: Progressing     Problem: INFECTION - ADULT  Goal: Absence or prevention of progression during hospitalization  Description: INTERVENTIONS:  - Assess and monitor for signs and symptoms of infection  - Monitor lab/diagnostic results  - Monitor all insertion sites, i.e. indwelling lines, tubes, and drains  - Monitor endotracheal if appropriate and nasal secretions for changes in amount and color  - [place] appropriate cooling/warming therapies per order  - Administer medications as ordered  - Instruct and encourage patient and family to use good hand hygiene technique  - Identify and instruct in appropriate isolation precautions for identified infection/condition  Outcome: Progressing  Goal: Absence of fever/infection during neutropenic period  Description: INTERVENTIONS:  - Monitor WBC    Outcome: Progressing     Problem: SAFETY ADULT  Goal: Patient will remain free of falls  Description: INTERVENTIONS:  - Educate patient/family on patient safety including physical limitations  - Instruct patient to call for assistance with activity   - Consult OT/PT to assist with strengthening/mobility   - Keep Call bell within reach  - Keep bed low and locked with side rails adjusted as appropriate  - Keep care items and personal belongings within reach  - Initiate and maintain comfort rounds  - Make Fall Risk Sign visible to staff  - Offer Toileting every 1 Hours,  in advance of need  - Initiate/Maintain bed alarm  - Obtain necessary fall risk management equipment: bed alarm  - Apply yellow socks and bracelet for high fall risk patients  - Consider moving patient to room near nurses station  Outcome: Progressing  Goal: Maintain or return to baseline ADL function  Description: INTERVENTIONS:  -  Assess patient's ability to carry out ADLs; assess patient's baseline for ADL function and identify physical deficits which impact ability to perform ADLs (bathing, care of mouth/teeth, toileting, grooming, dressing, etc.)  - Assess/evaluate cause of self-care deficits   - Assess range of motion  - Assess patient's mobility; develop plan if impaired  - Assess patient's need for assistive devices and provide as appropriate  - Encourage maximum independence but intervene and supervise when necessary  - Involve family in performance of ADLs  - Assess for home care needs following discharge   - Consider OT consult to assist with ADL evaluation and planning for discharge  - Provide patient education as appropriate  Outcome: Progressing  Goal: Maintains/Returns to pre admission functional level  Description: INTERVENTIONS:  - Perform AM-PAC 6 Click Basic Mobility/ Daily Activity assessment daily.  - Set and communicate daily mobility goal to care team and patient/family/caregiver.   - Collaborate with rehabilitation services on mobility goals if consulted  - Perform Range of Motion 3 times a day.  - Reposition patient every 2 hours.  - Dangle patient 3 times a day  - Stand patient 3 times a day  - Ambulate patient 3 times a day  - Out of bed to chair 3 times a day   - Out of bed for meals 3 times a day  - Out of bed for toileting  - Record patient progress and toleration of activity level   Outcome: Progressing     Problem: DISCHARGE PLANNING  Goal: Discharge to home or other facility with appropriate resources  Description: INTERVENTIONS:  - Identify barriers to discharge w/patient and  caregiver  - Arrange for needed discharge resources and transportation as appropriate  - Identify discharge learning needs (meds, wound care, etc.)  - Arrange for interpretive services to assist at discharge as needed  - Refer to Case Management Department for coordinating discharge planning if the patient needs post-hospital services based on physician/advanced practitioner order or complex needs related to functional status, cognitive ability, or social support system  Outcome: Progressing     Problem: Knowledge Deficit  Goal: Patient/family/caregiver demonstrates understanding of disease process, treatment plan, medications, and discharge instructions  Description: Complete learning assessment and assess knowledge base.  Interventions:  - Provide teaching at level of understanding  - Provide teaching via preferred learning methods  Outcome: Progressing

## 2025-01-10 LAB
BACTERIA BLD CULT: NORMAL
BACTERIA BLD CULT: NORMAL

## 2025-01-22 NOTE — UTILIZATION REVIEW
URGENT/EMERGENT  INPATIENT/SPU AUTHORIZATION REQUEST    Date: 01/22/25            # Pages in this Request:     X New Request   Additional Information for PA#:     Office Contact Name:  Cristal Hernadez Title: Utilization Review, Registed Nurse     Phone: [phone]  Ext.     Availability (Date/Time): M-F 8-4    Type of Review:  SPU WITH INPT AFTERCARE    Late Pick-Up    For Late Pick-up Cases:  How your facility was first notified of the Late Pick-up: PATHS  When your facility was first notified of the Late Pick-up (date): 1/13/25    Was the recipient a prisoner at the time of admission? no           RECIPIENT INFORMATION    Recipient ID#: [id number]  Recipient Name: Cristal Devine      YOB: 1985  40 y.o. Recipient Alias:     Gender:   Male x Female Medicaid Eligibility (HCB Package):          INSURANCE INFORMATION    (All other private or governmental health insurance benefits must be utilized prior to billing the [place] Program)    Was this admission the result of an MVA, other accident, assault, injury, fall, gunshot, bite etc.?  no   If yes, provide a brief description of the incident.      Does the recipient have other insurance coverage?  no  Insurance Company Name:    Policy #:  Did that insurance pay on this claim?    Yes  No     Did that insurance deny this claim?   Yes  No     If yes, reason for denial:      Does the recipient have Medicare?  no  Did Medicare exhaust prior to this admission?    Yes  No     Did Medicare partially pay this claim?   Yes  No     Did Medicare deny this claim?   Yes  No   If yes, reason for denial:      Was the recipient a prisoner at the time of admission?  no        PROVIDER INFORMATION    Hospital Name:  [facility]  [facility] Provider ID#: [id number]     Admitting Physician: [facility]                           [facility] Provider ID#: [id number]        ADMISSION INFORMATION    Type of Admission: (please choose one)  ED    Admission Floor or  "Unit Type:MED-SURG      Dates/Times:        ED Date/Time: 1/4/2025 10:01 PM        Observation Date/Time:  1/5/2025  0059        IP Admission Date/Time: 1/6/25  3:06 PM        Discharge or Transfer Date/Time: 1/7/2025 12:18 PM        DIAGNOSIS/PROCEDURE CODES    Primary Diagnosis Code/Primary Diagnosis Code description:    K35.30 - Acute appendicitis with localized peritonitis, without perforation or gangrene     Additional Diagnosis Code(s) and Description(s)-(up to 3 additional codes):      Procedure Code (1) and description:CPT 90613-VAY 1/5/25-APPENDECTOMY LAPAROSCOPIC         CLINICAL INFORMATION - PRIOR ADMISSION ONLY    Is there a prior admission with a discharge date within 30 days of the date of this admission?    X No (Proceed to the next section - \"Clinical Information - General Review Checklist\")      Yes (Provide the following information)     Prior admission dates:    MA Prior Authorization Number:        Review Outcome:     Diagnosis Code(s)/Description:    Procedure Code/Description:    Findings:    Treatment:    Condition on Discharge:   Vitals:    Labs:   Imaging:   Medications:    Follow-up Instructions:    Disposition:        CLINICAL INFORMATION - GENERAL REVIEW CHECKLIST    EMERGENCY DEPARTMENT: (Proceed to \"ADMISSION\" if Direct Admission)    Presenting Signs/Symptoms:  Chief Complaint   Patient presents with    Abdominal Pain     Rlq started as epigastric      1/4/25- 1/5/25 Initial Presentation: 39 y.o. female presents to the ED due to severe constant abdominal pain, nausea and vomiting and chills. Initially pain started as periumbilical and then radiated down to her RLQ abdomen. The pain became more severe and she also reportsd bloating with some diarrhea, moving makes the pain worse and nothing thus far has improved the pain. Exam: guarding and pain with palpation to abdomen. In acute distress due to the pain. Abnormal labs/imaging: CT A/P reveals acute appendicitis, WBC 15.73, AST 10. In " the ED pt given IVF's of NS 1 liter, IV flagyl, IV Cefazolin and IV dilaudid for pain.Pt reports muscles became tight in her throat after receiving IV dilaudid, listed as an allergy.  Plan: admit to observation for acute appendicitis, general surgery consult, NPO, IVF's, IV pain control.         Date: 1/5/25   Day 2: General Surgery consult: Exam: mildly distended but soft, positive Rovsing's, positive tenderness at McBurney's point, mild rebound, cannot appreciate small umbilical hernia that was seen on CT scan due to patient body habitus . /91, HR91, temp 98.8 F. Plan: NPO, to OR for laparoscopic appendectomy. Continue  IVF's.      1/5/25 OP note Procedure(s):  APPENDECTOMY LAPAROSCOPIC     Operative Findings:  The appendix was elongated, hyperemic, and enlarged.  There was no gross purulence.  There was no perforation.     CONVERTED TO IP 1/6/25.      1/6/25 General surgery: POD1 s/p laparoscopic appendectomy. Exam: Afebrile Temp 98.7 F, /85, HR 80, RR 18. On 2 liters oxygen, SPO2 95%, UOP 1053 ml. c/o abdominal soreness, + flatus, has been voiding, ambulated, no N/V. abdomen is flat, soft, TTP around incisions. WBC 10.2., HGB 9.9 today. Pt expressing concerns regarding being dc today due to having children at home and a new dog. IV lorazepam x1 dose given today. IV TORADOL Q6.  Plan: continue IVF's of NS at 100 ml/hr, IV PPI Q24, surgical soft diet, pain medications PRN, Encourage OOB, ambulation, trend labs and vitals.      Medication/treatment prior to arrival in the ED:    Past Medical History:  has a past medical history of Acid reflux, COVID-19, GERD (gastroesophageal reflux disease), and Hyperlipidemia.     Clinical Exam:    Initial Vital Signs: (Temp, Pulse, Resp, and BP)   ED Triage Vitals [01/04/25 2213]   Temperature Pulse Respirations Blood Pressure SpO2   98.8 °F (37.1 °C) 91 18 156/91 98 %      Temp Source Heart Rate Source Patient Position - Orthostatic VS BP Location FiO2 (%)    Tympanic Monitor Sitting Left arm --      Pain Score       8           Pertinent Repeat Vital Signs: (include times they were obtained)  ate/Time Temp Pulse Resp BP MAP (mmHg) SpO2 Calculated FIO2 (%) - Nasal Cannula O2 Flow Rate (L/min) Nasal Cannula O2 Flow Rate (L/min) O2 Device Cardiac (WDL) Patient Position - Orthostatic VS Sherry Coma Scale Score Pain      01/06/25 1417 -- -- -- -- -- -- -- -- -- -- -- -- -- 5 01/06/25 0900 -- -- -- -- -- 96 % -- -- -- None (Room air) -- -- 15 --     01/06/25 0810 -- -- -- -- -- -- -- -- -- -- -- -- -- 5 01/06/25 07:06:05 98.7 °F (37.1 °C) 80 18 130/85 100 95 % -- -- -- -- -- Lying -- --     01/06/25 0100 -- -- -- -- -- -- -- -- -- -- -- -- 15 No Pain     01/05/25 22:42:36 98.6 °F (37 °C) 77 -- 136/87 103 95 % -- -- -- -- -- -- -- --     01/05/25 2202 -- -- -- -- -- -- -- -- -- -- -- -- -- 5 01/05/25 1900 -- 76 -- -- -- 97 % -- -- -- -- -- -- -- --     01/05/25 18:37:58 98.3 °F (36.8 °C) 77 -- 122/83 96 92 % -- -- -- -- -- -- -- --     01/05/25 1656 -- -- -- -- -- -- -- -- -- -- -- -- -- 8     01/05/25 16:19:10 98.8 °F (37.1 °C) 80 -- 125/86 99 97 % -- -- -- -- -- -- -- --     01/05/25 14:56:04 -- 73 -- 114/67 83 98 % -- -- -- -- -- -- -- --     01/05/25 14:30:40 98.3 °F (36.8 °C) 84 -- 102/64 77 97 % -- -- -- -- -- -- -- --     01/05/25 1410 97.8 °F (36.6 °C) 75 16 98/57 73 97 % 28 -- 2 L/min Nasal cannula WDL -- -- 7     01/05/25 1355 -- 71 16 111/61 78 96 % 28 -- 2 L/min Nasal cannula WDL -- -- 7 01/05/25 1340 -- 64 16 121/64 86 99 % 32 -- 3 L/min Nasal cannula WDL -- -- 8 01/05/25 1339 -- -- -- -- -- -- -- -- -- -- -- -- -- 8 01/05/25 1325 -- 73 16 97/47 68 100 % -- 4 L/min -- Simple mask WDL -- -- 9 01/05/25 1310 97.9 °F (36.6 °C) 84 16 100/44 63 100 % -- 4 L/min -- Simple mask WDL -- -- 9 01/05/25 1153 -- -- -- -- -- -- -- -- -- -- -- -- -- 8 01/05/25 0800 -- -- -- -- -- -- -- -- -- -- -- -- 15 2     01/05/25 07:42:02 98 °F (36.7  °C) 80 18 100/62 75 96 % -- -- -- None (Room air) -- Lying -- --     01/05/25 0503 -- -- -- -- -- -- -- -- -- -- -- -- -- 10 - Worst Possible Pain     01/05/25 0412 -- -- -- -- -- 96 % -- -- -- None (Room air) -- -- 15 --     01/05/25 0248 -- -- -- -- -- -- -- -- -- -- -- -- -- 10 - Worst Possible Pain     01/05/25 0120 -- -- -- -- -- -- -- -- -- -- -- -- -- 10 - Worst Possible Pain     01/05/25 0020 -- 86 17 140/77 101 100 % -- -- -- None (Room air) -- Lying -- --     01/04/25 2243 -- -- -- -- -- -- -- -- -- -- -- -- 15 --     01/04/25 2213 98.8 °F (37.1 °C) 91 18 156/91 -- 98 % -- -- -- -- -- Sitting -- 8                Pertinent Sustained Findings: (include times they were obtained)    Weight in Kilograms:   Wt Readings from Last 1 Encounters:   01/05/25 78 kg (171 lb 15.3 oz)       Pertinent Labs (results):  Results from last 7 days   Lab Units 01/06/25  0627 01/05/25 0129 01/04/25 2259   WBC Thousand/uL 10.25*  --  15.73*   HEMOGLOBIN g/dL 9.9*  --  12.0   HEMATOCRIT % 30.9*  --  38.0   PLATELETS Thousands/uL 250 279 316   TOTAL NEUT ABS Thousands/µL 7.51  --  12.06*               Results from last 7 days   Lab Units 01/04/25 2259   SODIUM mmol/L 135   POTASSIUM mmol/L 4.1   CHLORIDE mmol/L 104   CO2 mmol/L 23   ANION GAP mmol/L 8   BUN mg/dL 19   CREATININE mg/dL 0.80   EGFR ml/min/1.73sq m 93   CALCIUM mg/dL 9.4           Results from last 7 days   Lab Units 01/04/25  2259   AST U/L 10*   ALT U/L 13   ALK PHOS U/L 49   TOTAL PROTEIN g/dL 7.1   ALBUMIN g/dL 4.3   TOTAL BILIRUBIN mg/dL 0.24               Results from last 7 days   Lab Units 01/04/25  2259   GLUCOSE RANDOM mg/dL 106                   Results from last 7 days   Lab Units 01/05/25  0129   PROTIME seconds 12.9   INR   0.92   PTT seconds 25               Results from last 7 days   Lab Units 01/04/25  2259   PROCALCITONIN ng/ml <0.05           Results from last 7 days   Lab Units 01/05/25  0129   LACTIC ACID mmol/L 0.8               Results from  "last 7 days   Lab Units 01/04/25  2259   LIPASE u/L 30                      Results from last 7 days   Lab Units 01/04/25  2259   CLARITY UA   Clear   COLOR UA   Straw   SPEC GRAV UA   1.025   PH UA   6.0   GLUCOSE UA mg/dl Negative   KETONES UA mg/dl Negative   BLOOD UA   Trace-lysed*   PROTEIN UA mg/dl Negative   NITRITE UA   Negative   BILIRUBIN UA   Negative   UROBILINOGEN UA E.U./dl 0.2   LEUKOCYTES UA   Negative   WBC UA /hpf 0-1   RBC UA /hpf 1-2   BACTERIA UA /hpf None Seen   EPITHELIAL CELLS WET PREP /hpf Occasional                          Results from last 7 days   Lab Units 01/05/25  0129   BLOOD CULTURE   No Growth at 24 hrs.  No Growth at 24 hrs.       Radiology (results):  CT abdomen pelvis with contrast   Final Interpretation by Daniel Galarza MD (01/05 0046)       Acute appendicitis       I personally discussed this study with CURTIS ALLEN at 1/5/25 12:44 AM     EKG (results):     Other tests (results):    Tests pending final results:    Treatment in the ED:   Medication Administration from 01/04/2025 2201 to 01/05/2025 0244         Date/Time Order Dose Route Action Action by Comments                                      01/05/2025 0132 EST sodium chloride 0.9 % bolus 1,000 mL 1,000 mL Intravenous New Bag                             01/05/2025 0131 EST ceFAZolin (ANCEF) IVPB (premix in dextrose) 2,000 mg 50 mL 2,000 mg Intravenous New Bag       01/05/2025 0130 EST metroNIDAZOLE (FLAGYL) tablet 500 mg 500 mg Oral Given       01/05/2025 0120 EST HYDROmorphone (DILAUDID) injection 0.5 mg 0.5 mg Intravenous Given               Other treatments:      Change in condition while in the ED:     Response to ED Treatment:          OBSERVATION: (Proceed to \"ADMISSION\" if Direct Admission)    SPU    Orders written during the observation period  Meds Name, dose, route, time, how may doses given:  heparin (porcine), 5,000 Units, Subcutaneous, Q12H HEATHER  ketorolac, 15 mg, Intravenous, Q6H HEATHER  pantoprazole, 40 " mg, Intravenous, Q24H HEATHER        Continuous IV Infusions:  sodium chloride, 100 mL/hr, Intravenous, Continuous        PRN Meds:  acetaminophen, 650 mg, Oral, Q6H PRN  LORazepam, 0.5 mg, Intravenous, Q6H PRN x1 dose 1/6/25   ondansetron, 4 mg, Intravenous, Q6H PRN x3 doses 1/5/25   oxyCODONE, 10 mg, Oral, Q4H PRN  oxyCODONE, 5 mg, Oral, Q4H PRN        Labs, imaging, other:  Consults and findings:  Assessment:  Acute appendicitis  Plan:  I discussed with the patient that surgical intervention is indicated.  Other options were discussed.  The procedure risks of a laparoscopic appendectomy, possible open appendectomy were thoroughly discussed with the patient.  Risks include, but are not limited to, bleeding, infection, bowel injury, stump leakage, abscess formation, poor wound healing, and/or hernias.  She understands and consent was obtained.  She will be taken to the operating room today.      Test Results during the observation period  Pertinent Lab tests (dates/results):  Culture results (blood, urine, spinal, wound, respiratory, etc.):  Imaging tests (dates/results):  EKG (dates/results):  Other test (dates/results):  Tests pending (dates/results):    Surgical or Invasive Procedures during the observation period  Name of surgery/procedure:  Date & Time:  Patient Response:  SURGERY DATE: 1/5/2025     Preop Diagnosis:  Appendicitis [K37]     Post-Op Diagnosis Codes:     * Appendicitis [K37]     Procedure(s):  APPENDECTOMY LAPAROSCOPIC     Anesthesia Type:   General     Operative Indications:  Appendicitis [K37]        Operative Findings:  The appendix was elongated, hyperemic, and enlarged.  There was no gross purulence.  There was no perforation.        Complications:   None    Response to Treatment, Major Change in Condition, Major Charge in Treatment during the observation period  WAS OBSERVATION 1/5/25 CONVERTED TO INPATIENT ADMISSION 1/6/25 DUE TO CONTINUED STAY REQUIRED TO CARE FOR PATIENT WITH ACUTE  APPENDICITIS/POST OP PAIN  .         ADMISSION:    IN AFTERCARE    ALL Admissions:    Admission Orders and Other Orders written within the first 24 hrs after admission  Meds Name, dose, route, time, how may doses given:  ceFAZolin (ANCEF) IVPB (premix in dextrose) 2,000 mg 50 mL  Dose: 2,000 mg  Freq: Every 8 hours Route: IV  Last Dose: 2,000 mg (01/06/25 0103)  Start: 01/05/25 1730 End: 01/06/25 0133   Admin Instructions:      Order specific questions:              1655 0103         ceFAZolin (ANCEF) IVPB (premix in dextrose) 2,000 mg 50 mL  Dose: 2,000 mg  Freq: Every 8 hours Route: IV  Last Dose: 2,000 mg (01/05/25 0957)  Start: 01/05/25 0130 End: 01/05/25 1502   Admin Instructions:      Order specific questions:              0131     0205     0957     1502-D/C'd         heparin (porcine) subcutaneous injection 5,000 Units  Dose: 5,000 Units  Freq: Every 12 hours scheduled Route: SC  Start: 01/05/25 0130 End: 01/07/25 1419   Admin Instructions:              (5774) 3840 [C]     3147 (0714) (4533) (9752)     1414-D/C'd      HYDROmorphone HCl (DILAUDID) injection 0.2 mg  Dose: 0.2 mg  Freq: Once Route: IV  Start: 01/05/25 0100 End: 01/05/25 0105   Admin Instructions:              0105-D/C'd  (0115)          ketorolac (TORADOL) injection 15 mg  Dose: 15 mg  Freq: Every 6 hours scheduled Route: IV  Start: 01/06/25 0730 End: 01/07/25 1419   Admin Instructions:               0810     1416     185     2324 (5480) [C]     8896     1418-D/C'd      ketorolac (TORADOL) injection 15 mg  Dose: 15 mg  Freq: Every 6 hours scheduled Route: IV  Start: 01/06/25 0730 End: 01/06/25 0726   Admin Instructions:               0726-D/C'd       metroNIDAZOLE (FLAGYL) tablet 500 mg  Dose: 500 mg  Freq: Every 8 hours scheduled Route: PO  Start: 01/05/25 2200 End: 01/06/25 0619   Admin Instructions:      Order specific questions:              2201 0619         metroNIDAZOLE (FLAGYL) tablet 500 mg  Dose: 500  mg  Freq: Every 8 hours scheduled Route: PO  Start: 01/05/25 0200 End: 01/05/25 1502   Admin Instructions:      Order specific questions:              0130     1436 [C]     1502-D/C'd        pantoprazole (PROTONIX) injection 40 mg  Dose: 40 mg  Freq: Every 24 hours scheduled Route: IV  Start: 01/06/25 0900 End: 01/07/25 0851   Admin Instructions:               0810      0851-D/C'd      piperacillin-tazobactam (ZOSYN) IVPB 4.5 g  Dose: 4.5 g  Freq: Once Route: IV  Start: 01/05/25 0100 End: 01/05/25 0105   Order specific questions:              0105-D/C'd  (0115)           sodium chloride 0.9 % bolus 1,000 mL  Dose: 1,000 mL  Freq: Once Route: IV  Last Dose: 1,000 mL (01/05/25 0132)  Start: 01/05/25 0100 End: 01/05/25 0202   Admin Instructions:              0132          Followed by   sodium chloride 0.9 % bolus 1,000 mL  Dose: 1,000 mL  Freq: Once Route: IV  Last Dose: 1,000 mL (01/05/25 0304)  Start: 01/05/25 0128 End: 01/05/25 0334   Admin Instructions:              0304                      Continuous Meds Sorted by Name  for Cristal Devine as of 12/29/24 through 1/7/25  Legend:       Medications 12/29 12/30 12/31 01/01 01/02 01/03 01/04 01/05 01/06 01/07   dexmedeTOMIDine (Precedex) 400 mcg in sodium chloride 0.9 % 100 mL infusion  Freq: Continuous PRN Route: IV  Start: 01/05/25 1217 End: 01/05/25 1304           1217     1226     1249     1304 [C]     1304-D/C'd        lactated ringers infusion  Freq: Continuous PRN Route: IV  Start: 01/05/25 1142 End: 01/05/25 1304           1142     1239 [C]     1240     1304 [C]     1304-D/C'd        metroNIDAZOLE (FLAGYL) IVPB (premix)  Freq: Continuous PRN Route: IV  Last Dose: Stopped (01/05/25 1224)  Start: 01/05/25 1146 End: 01/05/25 1304           1146     1146     1202     1224     1304-D/C'd        sodium chloride 0.9 % infusion  Rate: 100 mL/hr Dose: 100 mL/hr  Freq: Continuous Route: IV  Indications of Use: IV Hydration  Last Dose: Stopped (01/07/25 0700)  Start:  01/05/25 0115 End: 01/07/25 0820           0504     1947      0622      0700 [C]     0820-D/C'd                  PRN Meds Sorted by Name  for Cristal Devine as of 12/29/24 through 1/7/25  Legend:       Medications 12/29 12/30 12/31 01/01 01/02 01/03 01/04 01/05 01/06 01/07   acetaminophen (Ofirmev) injection 1,000 mg  Dose: 1,000 mg  Freq: Every 6 hours PRN Route: IV  PRN Reason: mild pain  Start: 01/05/25 0102 End: 01/05/25 1502   Admin Instructions:              0323     1208     1309     1502-D/C'd        acetaminophen (TYLENOL) tablet 650 mg  Dose: 650 mg  Freq: Every 6 hours PRN Route: PO  PRN Reason: mild pain  Start: 01/06/25 0726 End: 01/07/25 1419             1419-D/C'd      bupivacaine (PF) (MARCAINE) 0.25 % injection  Freq: As needed  Start: 01/05/25 1249 End: 01/05/25 1305           1249     1305-D/C'd        dexamethasone (PF) (DECADRON) injection  Freq: As needed Route: IV  Start: 01/05/25 1202 End: 01/05/25 1304           1202     1304-D/C'd        dexmedeTOMIDine (Precedex) 400 mcg in sodium chloride 0.9 % 100 mL infusion  Freq: Continuous PRN Route: IV  Start: 01/05/25 1217 End: 01/05/25 1304           1217     1226     1249     1304 [C]     1304-D/C'd        fentaNYL (SUBLIMAZE) injection 25 mcg  Dose: 25 mcg  Freq: Every 5 minutes PRN Route: IV  PRN Reason: Pain - PACU  PRN Comment: Breakthrough - first line  Indications of Use: PAIN  Start: 01/05/25 1310 End: 01/05/25 1434   Admin Instructions:              1329     1334     1339     1346     1434-D/C'd        fentaNYL injection  Freq: As needed Route: IV  Start: 01/05/25 1204 End: 01/05/25 1304           1204     1215     1304-D/C'd        HYDROmorphone (DILAUDID) injection 0.5 mg  Dose: 0.5 mg  Freq: Every 3 hours PRN Route: IV  PRN Reason: severe pain  Start: 01/05/25 0102 End: 01/05/25 0456   Admin Instructions:              0120     0456-D/C'd        HYDROmorphone HCl (DILAUDID) injection 0.2 mg  Dose: 0.2 mg  Freq: Every 4 hours PRN Route:  IV  PRN Reason: breakthrough pain  Start: 01/05/25 0439 End: 01/05/25 0456   Admin Instructions:              0456-D/C'd        iohexol (OMNIPAQUE) 350 MG/ML injection (SINGLE-DOSE) 100 mL  Dose: 100 mL  Freq: Once in imaging Route: IV  PRN Reason: contrast  Start: 01/04/25 2339 End: 01/04/25 2340          2340           ketorolac (TORADOL) injection 15 mg  Dose: 15 mg  Freq: Every 6 hours PRN Route: IV  PRN Reason: moderate pain  Start: 01/05/25 1502 End: 01/06/25 0726   Admin Instructions:              1656     2202      0726-D/C'd       lactated ringers infusion  Freq: Continuous PRN Route: IV  Start: 01/05/25 1142 End: 01/05/25 1304           1142     1239 [C]     1240     1304 [C]     1304-D/C'd        lidocaine (PF) (XYLOCAINE-MPF) 2 % injection  Freq: As needed Route: IV  Start: 01/05/25 1159 End: 01/05/25 1304           1159     1304-D/C'd        lidocaine-epinephrine (XYLOCAINE/EPINEPHRINE) 1 %-1:100,000 injection  Freq: As needed  Start: 01/05/25 1249 End: 01/05/25 1305           1249     1305-D/C'd        LORazepam (ATIVAN) injection 0.5 mg  Dose: 0.5 mg  Freq: Every 6 hours PRN Route: IV  PRN Reason: anxiety  Start: 01/05/25 0330 End: 01/07/25 1419   Admin Instructions:              0435      0127     2324      1419-D/C'd      metroNIDAZOLE (FLAGYL) IVPB (premix)  Freq: Continuous PRN Route: IV  Start: 01/05/25 1146 End: 01/05/25 1304           1146     1146     1202     1224     1304-D/C'd        midazolam (VERSED) injection  Freq: As needed Route: IV  Start: 01/05/25 1155 End: 01/05/25 1304           1155     1304-D/C'd        morphine injection 2 mg  Dose: 2 mg  Freq: Every 3 hours PRN Route: IV  PRN Reason: severe pain  Indications of Use: MODERATE TO SEVERE PAIN  Start: 01/05/25 0456 End: 01/06/25 0726   Admin Instructions:              0503      0726-D/C'd       ondansetron (ZOFRAN) injection 4 mg  Dose: 4 mg  Freq: Once as needed Route: IV  PRN Reason: nausea  PRN Comment: First Line For  Nausea  Indications of Use: POSTOPERATIVE NAUSEA AND VOMITING  Start: 01/05/25 1310 End: 01/05/25 1434   Admin Instructions:              1434-D/C'd        ondansetron (ZOFRAN) injection 4 mg  Dose: 4 mg  Freq: Every 6 hours PRN Route: IV  PRN Reasons: nausea,vomiting  Start: 01/05/25 0100 End: 01/07/25 1419   Admin Instructions:              0300     1226     1412       1419-D/C'd          Labs, imaging, other:      Consults and findings:  Progressing slowly but in the right direction on POD1 s/p lap appy. Tolerating small amounts of PO intake, encourage increasing mobilization, small frequent meals, staying hydrated. Anticipate discharge home on POD2.            Expand All Collapse All    Progress Note - Surgery-General   Name: Cristal Purvis y.o. female I MRN: [id number]  Unit/Bed#: -01 I Date of Admission: 1/4/2025   Date of Service: 1/6/2025 I Hospital Day: 0     Assessment & Plan  Acute appendicitis with localized peritonitis, without perforation, abscess, or gangrene  38yo female now POD1 s/p laparoscopic appendectomy               -has not eaten breakfast yet, has been out of bed, urinating, some abdominal soreness               -abdomen is flat, soft, TTP around incisions               -afebrile, VSS               -WBC 10.2 (15.7)               -BMP pending     Plan:  -will re-evaluate patient later today for discharge; patient has concerns about returning home today due to having children and a new dog  -continue IVF  -surgical soft diet  -pain medication adjusted  -encourage OOB  -trend labs and vitals  -will discuss with Dr Leija        Subjective  Patient complaining of abdominal soreness. Has not eaten breakfast yet. Passing gas. Has been peeing. Has been out of bed. No nausea, vomiting.        Discharge Summary - Surgery-General   Name: Cristal Purvis y.o. female I MRN: [id number]  Unit/Bed#: -01 I Date of Admission: 1/4/2025   Date of Service: 1/7/2025 I Hospital Day: 1    "  Admission Date:   Admission Orders (From admission, onward)          Ordered         01/06/25 1506   INPATIENT ADMISSION  Once             01/05/25 0059   Place in Observation  Once                               Discharge Date:  1/7/25     Admitting Diagnosis: Appendicitis [K37]  Abdominal pain [R10.9]  Discharge Diagnosis: Appendicitis [K37]  Abdominal pain [R10.9]        Procedures Performed: 1/5/25 laparoscopic appendectomy with Dr Cally Mckinnon     HPI: Per Dr Cally Mckinnon H&P 1/5: \"The patient is a 39-year-old female who states that about 2:00 on Saturday she started to get periumbilical discomfort. She states that it then by about 4:00 it radiated down to her right lower quadrant and by 6 PM yesterday she had severe right lower quadrant discomfort. She did develop nausea, but no vomiting. She has felt that her abdomen was significantly bloated. She did have some diarrhea as well. She has never had similar pain in the past. Moving makes the pain worse. Nothing makes the pain better. She tried to eat a few bites of chicken last night at 6:00, but could not keep it down. She denies any change in her bowel habits. Her last menses was December 17 and it was normal. She denies any urinary symptomatology. I discussed her Dilaudid allergy which was on the chart and there seem to be a little confusion this morning. The patient states that she felt that her muscles got tight in her throat but she did not get any shortness of breath or rash at the time. I will place Dilaudid back on her allergy list with that symptomatology and will not order.\"      Hospital Course: Patient presented to the [facility] ED 1/4 with abdominal pain. CT showed acute appendicitis. Patient was admitted to the general surgery service and started on IV abx. Patient was taken to the OR 1/5 for laparoscopic appendectomy. Patient's diet was advanced the following. Patient was having issues with pain control yesterday morning and was kept " overnight to address that issue. This morning patient is feeling well. She has been out of bed. She took a shower. Her lab work was normal. She is tolerating a regular diet. Patient feels well enough to go home. A follow up appointment has been arranged. Patient is in agreement with the discharge and follow up plan.      Physical Exam  Constitutional:       Appearance: Normal appearance.   HENT:      Head: Normocephalic and atraumatic.      Nose: Nose normal.      Mouth/Throat:      Mouth: Mucous membranes are moist.      Pharynx: Oropharynx is clear.   Eyes:      Conjunctiva/sclera: Conjunctivae normal.      Pupils: Pupils are equal, round, and reactive to light.   Cardiovascular:      Rate and Rhythm: Normal rate.   Pulmonary:      Effort: Pulmonary effort is normal.   Abdominal:      General: Abdomen is flat.      Palpations: Abdomen is soft.      Tenderness: There is abdominal tenderness.      Comments: Incisions c/d/i   Musculoskeletal:         General: Normal range of motion.   Skin:     General: Skin is warm and dry.   Neurological:      General: No focal deficit present.      Mental Status: She is alert.   Psychiatric:         Mood and Affect: Mood normal.            Condition at Discharge: good      Discharge instructions/Information to patient and family:   See after visit summary for information provided to patient and family.       Provisions for Follow-Up Care:  See after visit summary for information related to follow-up care and any pertinent home health orders.       Disposition: Home           Planned Readmission: No     Discharge Statement:  I have spent a total time of 20 minutes in caring for this patient on the day of the visit/encounter.      Discharge Medications:  See after visit summary for reconciled discharge medications provided to patient and family.         Test Results after admission  Pertinent Lab tests (dates/results):    Culture results (blood, urine, spinal, wound, respiratory,  etc.):  Imaging tests (dates/results):    EKG (dates/results):    Other test (dates/results):  Tests pending (dates/results):    Surgical or Invasive Procedures  Name of surgery/procedure:  Date & Time:  Patient Response:  Post-operative orders:  Operative Report/Findings:    Response to Treatment, Major Change in Condition, Major Charge in Treatment anytime during admission      Disposition on Discharge  Home, Rehab, SNF, LTC, Shelter, etc.: Home/Self Care    Cease to Breathe (CTB)  If a patient expires during an admission, in addition to the above information, please include:    Summary/timeline of the patient's decline in condition:    Medications and treatment:    Patient response to treatment:    Date and time patient ceased to breathe:        Is there a Readmission that follows this admission? no  If yes, provide dates:        InterQual Review  InterQual Criteria Met: yes    Please include the InterQual Review, InterQual year/version used, and the criteria selected:   InterQual® Review Status: Completed  Criteria Status: Acute Met  Day of review: Post-op Day 1  Condition Specific: Yes        REVIEW DETAILS     Product: LOC:Acute Adult  Subset: General Surgical            [X] Select Day, One:          [X] Post-op Day 1, One:              [X] ACUTE, One:                  [X] Partial responder, not clinically stable for discharge and requires continued stay, One:                      [X] Routine post-op stay, One:                          [X] Moderate stay within the last 5d                      [  ] Post surgery or invasive procedure and complication, >= One:                          [  ] Pain uncontrolled and, Both:                              [X] Requiring change in medication, dose, or frequency        Version: AorTx 2024, Dec. 2024 Release        PLEASE SUBMIT THE COMPLETED FORM TO THE DEPARTMENT OF HUMAN SERVICES - DIVISION OF CLINICAL  REVIEW VIA FAX -[phone] or VIA E-MAIL TO  [email or web address]    Signature: Cristal Hernadez Date:  01/22/25    Confidentiality Notice: The documents accompanying this telecopy may contain confidential information belonging to the sender.  The information is intended only for the use of the individual named above. If you are not the intended recipient, you are hereby notified  That any disclosure, copying, distribution or taking of any telecopy is strictly prohibited.

## 2025-01-30 ENCOUNTER — OFFICE VISIT (OUTPATIENT)
Dept: SURGERY | Facility: CLINIC | Age: 40
End: 2025-01-30

## 2025-01-30 VITALS
TEMPERATURE: 98.1 F | WEIGHT: 174 LBS | DIASTOLIC BLOOD PRESSURE: 60 MMHG | HEIGHT: 63 IN | OXYGEN SATURATION: 96 % | BODY MASS INDEX: 30.83 KG/M2 | HEART RATE: 84 BPM | SYSTOLIC BLOOD PRESSURE: 110 MMHG

## 2025-01-30 DIAGNOSIS — E66.09 CLASS 1 OBESITY DUE TO EXCESS CALORIES WITHOUT SERIOUS COMORBIDITY WITH BODY MASS INDEX (BMI) OF 32.0 TO 32.9 IN ADULT: ICD-10-CM

## 2025-01-30 DIAGNOSIS — E66.811 CLASS 1 OBESITY DUE TO EXCESS CALORIES WITHOUT SERIOUS COMORBIDITY WITH BODY MASS INDEX (BMI) OF 32.0 TO 32.9 IN ADULT: ICD-10-CM

## 2025-01-30 DIAGNOSIS — K21.9 GASTROESOPHAGEAL REFLUX DISEASE WITHOUT ESOPHAGITIS: ICD-10-CM

## 2025-01-30 DIAGNOSIS — F41.9 ANXIETY: ICD-10-CM

## 2025-01-30 DIAGNOSIS — Z90.49 S/P APPENDECTOMY: Primary | ICD-10-CM

## 2025-01-30 PROCEDURE — 99024 POSTOP FOLLOW-UP VISIT: CPT | Performed by: SURGERY

## 2025-02-01 PROBLEM — K35.30 ACUTE APPENDICITIS WITH LOCALIZED PERITONITIS, WITHOUT PERFORATION, ABSCESS, OR GANGRENE: Status: RESOLVED | Noted: 2025-01-05 | Resolved: 2025-02-01

## 2025-02-01 PROBLEM — Z90.49 S/P APPENDECTOMY: Status: ACTIVE | Noted: 2025-02-01

## 2025-02-01 NOTE — PROGRESS NOTES
Post-Op Note - General Surgery   Cristal Devine 40 y.o. female MRN: [id number]  Encounter: [id number]    Assessment & Plan     40F 3.5 weeks status post laparoscopic appendectomy for acute appendicitis with Dr. Mckinnon, pathology benign. She is recovering well. Incisions healed, no infection. No signs or symptoms of abscess. She will slowly return to normal activity, we discussed exercise and avoiding heavy lifting and straining for a few more weeks to allow further healing. She would like to establish with a new PCP, referral placed, will ask our team to outreach to their office and request scheduling. She will contact us on an as needed basis.    Subjective      Chief Complaint   Patient presents with    Post-op     Patient is here s/p lap appy 01/05/25 with Dr. Mckinnon. Pt states the incisions are healed and denies any drainage, redness/irriation or bulging. Pt reports poor appetite and denies nausea/vomiting, diarrhea/constipation, rectal pain/ bleeding, issues with urination, trouble eating/drinking/swallowing or fevers/chills. Pt states since she has been back to the gym she has been feeling soreness pain on her lower abd. Pt states a week after surgery she felt a severe pain where the left inc is located.      Doing well, no abdominal pain, nausea, vomiting, constipation, fever. Showering over incisions which are healed. Back to the gym and trying to figure out what activities are appropriate for her to do. Path benign. Needs PCP.       Review of Systems   Constitutional:  Positive for activity change. Negative for appetite change, fatigue and fever.   Gastrointestinal: Negative.    Genitourinary: Negative.    Skin:  Positive for wound. Negative for color change.   All other systems reviewed and are negative.      The following portions of the patient's history were reviewed and updated as appropriate: allergies, current medications, past family history, past medical history, past social history, past  "surgical history, and problem list.    Objective      Blood pressure 110/60, pulse 84, temperature 98.1 °F (36.7 °C), temperature source Temporal, height 5' 3\" (1.6 m), weight 78.9 kg (174 lb), last menstrual period 12/17/2024, SpO2 96%.   Physical Exam  Vitals reviewed.   Constitutional:       General: She is not in acute distress.     Appearance: She is not toxic-appearing.   HENT:      Head: Normocephalic.      Nose: No congestion.      Mouth/Throat:      Mouth: Mucous membranes are moist.   Eyes:      Pupils: Pupils are equal, round, and reactive to light.   Cardiovascular:      Rate and Rhythm: Normal rate.   Pulmonary:      Effort: Pulmonary effort is normal. No respiratory distress.   Abdominal:      General: There is no distension.      Palpations: Abdomen is soft.      Tenderness: There is no abdominal tenderness. There is no guarding or rebound.      Comments: Incisions healed   Musculoskeletal:         General: Normal range of motion.      Cervical back: Normal range of motion.   Skin:     General: Skin is warm.      Capillary Refill: Capillary refill takes less than 2 seconds.   Neurological:      General: No focal deficit present.      Mental Status: She is alert. Mental status is at baseline.   Psychiatric:         Mood and Affect: Mood normal.         Signature:  Wes Leija MD  Date: 2/1/2025 Time: 9:25 AM   "

## 2025-03-04 ENCOUNTER — OFFICE VISIT (OUTPATIENT)
Dept: FAMILY MEDICINE CLINIC | Facility: CLINIC | Age: 40
End: 2025-03-04
Payer: COMMERCIAL

## 2025-03-04 VITALS
OXYGEN SATURATION: 100 % | HEIGHT: 63 IN | TEMPERATURE: 97 F | SYSTOLIC BLOOD PRESSURE: 114 MMHG | HEART RATE: 75 BPM | DIASTOLIC BLOOD PRESSURE: 72 MMHG | BODY MASS INDEX: 31.18 KG/M2 | WEIGHT: 176 LBS

## 2025-03-04 DIAGNOSIS — Z12.31 ENCOUNTER FOR SCREENING MAMMOGRAM FOR BREAST CANCER: ICD-10-CM

## 2025-03-04 DIAGNOSIS — Z23 ENCOUNTER FOR IMMUNIZATION: ICD-10-CM

## 2025-03-04 DIAGNOSIS — F41.9 ANXIETY: ICD-10-CM

## 2025-03-04 DIAGNOSIS — Z90.49 S/P APPENDECTOMY: ICD-10-CM

## 2025-03-04 DIAGNOSIS — E66.811 CLASS 1 OBESITY DUE TO EXCESS CALORIES WITHOUT SERIOUS COMORBIDITY WITH BODY MASS INDEX (BMI) OF 32.0 TO 32.9 IN ADULT: ICD-10-CM

## 2025-03-04 DIAGNOSIS — E66.9 OBESITY (BMI 30-39.9): ICD-10-CM

## 2025-03-04 DIAGNOSIS — K21.9 GASTROESOPHAGEAL REFLUX DISEASE WITHOUT ESOPHAGITIS: ICD-10-CM

## 2025-03-04 DIAGNOSIS — Z11.59 NEED FOR HEPATITIS C SCREENING TEST: ICD-10-CM

## 2025-03-04 DIAGNOSIS — E66.09 CLASS 1 OBESITY DUE TO EXCESS CALORIES WITHOUT SERIOUS COMORBIDITY WITH BODY MASS INDEX (BMI) OF 32.0 TO 32.9 IN ADULT: ICD-10-CM

## 2025-03-04 DIAGNOSIS — Z76.89 ENCOUNTER TO ESTABLISH CARE: Primary | ICD-10-CM

## 2025-03-04 PROCEDURE — 99214 OFFICE O/P EST MOD 30 MIN: CPT

## 2025-03-04 RX ORDER — OMEPRAZOLE 40 MG/1
40 CAPSULE, DELAYED RELEASE ORAL DAILY
Qty: 30 CAPSULE | Refills: 0 | Status: SHIPPED | OUTPATIENT
Start: 2025-03-04

## 2025-03-04 NOTE — PATIENT INSTRUCTIONS
Breast Cancer Screening    Breast cancer is the most common cancer in females in the [place] and the second most common cause of cancer death in women    The risk of breast cancer from birth to death is 12.9 percent (1 in 8 women)    Approximately 43,000 women will die in the [place] annually from breast cancer    Mammography    A mammogram is an x-ray of your breasts to screen for breast cancer    It uses the least amount of radiation necessary to provide the highest quality image able to detect early signs of breast cancer.    For most women, we recommend getting your first mammogram at the age of 40 and repeating it yearly         Signs of Breast Cancer    Lumps  Thickening or swelling of parts of the breast  Irritation or dimpling of breast skin  Red or flaky skin in the nipple area  Pain in the nipple area  Pulling in of the nipple  Change in size or shape of the breast  Pain in any area of the breast     Did you know by getting a mammogram, doctors can find breast cancer 3 YEARS before a lump is even felt!    Early detection is BEST!  Schedule your mammogram TODAY!     To schedule this appointment with [facility], please contact Central Scheduling at [phone]

## 2025-03-04 NOTE — ASSESSMENT & PLAN NOTE
Hx of GERD  States she takes Prilosec 40 mg daily    Orders:    omeprazole (PriLOSEC) 40 MG capsule; Take 1 capsule (40 mg total) by mouth daily

## 2025-03-04 NOTE — PROGRESS NOTES
Name: Cristal Devine      : 1985      MRN: [id number]  Encounter Provider: PILAR Thomas  Encounter Date: 3/4/2025   Encounter department: [facility]  :  Assessment & Plan  Encounter to establish care    Presents to establish care  Prev seen by PILAR Patricia of [facility]  Last OV 10/10/2022    Lives at home with 3 children  States  is undergoing tx for colorectal cancer and currently staying with his mother  Laid off         Obesity (BMI 30-39.9)    Counseled    Orders:    CBC and differential; Future    TSH, 3rd generation with Free T4 reflex; Future    Comprehensive metabolic panel; Future    Lipid panel; Future    Hemoglobin A1C; Future    Ambulatory Referral to Weight Management; Future    Gastroesophageal reflux disease without esophagitis    Hx of GERD  States she takes Prilosec 40 mg daily    Orders:    omeprazole (PriLOSEC) 40 MG capsule; Take 1 capsule (40 mg total) by mouth daily    S/P appendectomy    DOS 2025         Encounter for screening mammogram for breast cancer    Orders:    Mammo screening bilateral w 3d and cad; Future    Need for hepatitis C screening test    Orders:    Hepatitis C Antibody; Future    Encounter for immunization    Counseled    Orders:    influenza vaccine preservative-free 0.5 mL IM (Fluzone, Afluria, Fluarix, Flulaval)        BMI Counseling: Body mass index is 31.18 kg/m². The BMI is above normal. Nutrition recommendations include reducing portion sizes, decreasing overall calorie intake, 3-5 servings of fruits/vegetables daily, reducing fast food intake, consuming healthier snacks, decreasing soda and/or juice intake, moderation in carbohydrate intake, increasing intake of lean protein, reducing intake of saturated fat and trans fat, and reducing intake of cholesterol. Exercise recommendations include moderate aerobic physical activity for 150 minutes/week, exercising 3-5 times per week, joining a gym, and  "strength training exercises.     BMI Counseling: Body mass index is 31.18 kg/m². The BMI is above normal. Exercise recommendations include exercising 3-5 times per week and strength training exercises.     Depression Screening and Follow-up Plan: Patient was screened for depression during today's encounter. They screened negative with a PHQ-2 score of 0.        History of Present Illness     Review of Systems   Constitutional:  Negative for chills, fatigue and fever.   HENT:  Negative for congestion, ear pain, facial swelling, hearing loss, rhinorrhea, sinus pressure, sneezing, sore throat and trouble swallowing.    Eyes:  Negative for pain, redness and visual disturbance.   Respiratory:  Negative for cough, chest tightness, shortness of breath and wheezing.    Cardiovascular:  Negative for chest pain and palpitations.   Gastrointestinal:  Negative for abdominal pain, diarrhea, nausea and vomiting.   Genitourinary:  Negative for dysuria, flank pain, hematuria and pelvic pain.   Musculoskeletal:  Negative for arthralgias, back pain and myalgias.   Skin:  Negative for color change and rash.   Neurological:  Negative for dizziness, seizures, syncope, weakness, light-headedness and headaches.   Psychiatric/Behavioral:  Negative for confusion, hallucinations and sleep disturbance. The patient is not nervous/anxious.    All other systems reviewed and are negative.      Objective   /72   Pulse 75   Temp (!) 97 °F (36.1 °C) (Tympanic)   Ht 5' 3\" (1.6 m)   Wt 79.8 kg (176 lb)   LMP 02/10/2025   SpO2 100%   BMI 31.18 kg/m²      Physical Exam  Vitals and nursing note reviewed.   Constitutional:       General: She is not in acute distress.     Appearance: She is well-developed.   HENT:      Head: Normocephalic and atraumatic.      Right Ear: Hearing and tympanic membrane normal.      Left Ear: Hearing and tympanic membrane normal.      Nose: Nose normal.      Mouth/Throat:      Mouth: Mucous membranes are moist.    "   Dentition: Normal dentition.      Tongue: No lesions.      Pharynx: Oropharynx is clear. Uvula midline. No oropharyngeal exudate.      Tonsils: No tonsillar exudate.   Eyes:      Extraocular Movements: Extraocular movements intact.      Conjunctiva/sclera: Conjunctivae normal.   Neck:      Vascular: No carotid bruit or JVD.   Cardiovascular:      Rate and Rhythm: Normal rate and regular rhythm.      Heart sounds: S1 normal and S2 normal. No murmur heard.  Pulmonary:      Effort: Pulmonary effort is normal. No tachypnea or respiratory distress.      Breath sounds: Normal breath sounds and air entry. No decreased breath sounds.   Chest:      Chest wall: No deformity or tenderness.   Abdominal:      General: Abdomen is flat. Bowel sounds are normal. There is no distension.      Palpations: Abdomen is soft.      Tenderness: There is no abdominal tenderness. There is no right CVA tenderness, left CVA tenderness or guarding.   Musculoskeletal:         General: No swelling.      Cervical back: Full passive range of motion without pain and neck supple.      Right lower leg: No edema.      Left lower leg: No edema.   Lymphadenopathy:      Cervical: No cervical adenopathy.   Skin:     General: Skin is warm and dry.      Capillary Refill: Capillary refill takes less than 2 seconds.      Findings: No rash.   Neurological:      Mental Status: She is alert and oriented to person, place, and time.      Cranial Nerves: Cranial nerves 2-12 are intact.      Sensory: Sensation is intact.      Motor: Motor function is intact.      Coordination: Coordination is intact.      Gait: Gait is intact.   Psychiatric:         Mood and Affect: Mood normal.         Behavior: Behavior is cooperative.

## 2025-03-16 ENCOUNTER — HOSPITAL ENCOUNTER (EMERGENCY)
Facility: HOSPITAL | Age: 40
Discharge: HOME | End: 2025-03-16
Attending: EMERGENCY MEDICINE
Payer: COMMERCIAL

## 2025-03-16 ENCOUNTER — APPOINTMENT (EMERGENCY)
Dept: RADIOLOGY | Facility: HOSPITAL | Age: 40
End: 2025-03-16
Payer: COMMERCIAL

## 2025-03-16 VITALS
HEART RATE: 78 BPM | TEMPERATURE: 98 F | DIASTOLIC BLOOD PRESSURE: 75 MMHG | RESPIRATION RATE: 20 BRPM | SYSTOLIC BLOOD PRESSURE: 158 MMHG | WEIGHT: 168 LBS | BODY MASS INDEX: 29.77 KG/M2 | HEIGHT: 63 IN | OXYGEN SATURATION: 97 %

## 2025-03-16 DIAGNOSIS — S83.91XA RIGHT KNEE SPRAIN: Primary | ICD-10-CM

## 2025-03-16 PROCEDURE — 73564 X-RAY EXAM KNEE 4 OR MORE: CPT

## 2025-03-16 PROCEDURE — 99283 EMERGENCY DEPT VISIT LOW MDM: CPT

## 2025-03-16 PROCEDURE — 99284 EMERGENCY DEPT VISIT MOD MDM: CPT | Performed by: EMERGENCY MEDICINE

## 2025-03-16 RX ORDER — IBUPROFEN 600 MG/1
600 TABLET, FILM COATED ORAL ONCE
Status: COMPLETED | OUTPATIENT
Start: 2025-03-16 | End: 2025-03-16

## 2025-03-16 RX ORDER — ACETAMINOPHEN 325 MG/1
975 TABLET ORAL ONCE
Status: COMPLETED | OUTPATIENT
Start: 2025-03-16 | End: 2025-03-16

## 2025-03-16 RX ADMIN — ACETAMINOPHEN 975 MG: 325 TABLET ORAL at 17:46

## 2025-03-16 RX ADMIN — IBUPROFEN 600 MG: 600 TABLET, FILM COATED ORAL at 17:46

## 2025-03-16 NOTE — ED PROVIDER NOTES
Time reflects when diagnosis was documented in both MDM as applicable and the Disposition within this note       Time User Action Codes Description Comment    3/16/2025  5:45 PM Alex Catalan Add [S83.91XA] Right knee sprain           ED Disposition       ED Disposition   Discharge    Condition   Stable    Date/Time   Sun Mar 16, 2025  5:45 PM    Comment   Cristal Devine discharge to home/self care.                   Assessment & Plan       Medical Decision Making  40-year-old female presenting for right knee injury.  Twisted it while reaching back in car.  No obvious deformity or swelling.  No direct trauma.  Have low suspicion for osseous abnormality but will obtain x-ray.  Please symptoms are secondary to ligamentous sprain.  Will give hinged knee brace.  Will place referral for orthopedic surgery    Problems Addressed:  Right knee sprain: acute illness or injury    Amount and/or Complexity of Data Reviewed  Radiology: independent interpretation performed.    Risk  OTC drugs.  Prescription drug management.             Medications   acetaminophen (TYLENOL) tablet 975 mg (975 mg Oral Given 3/16/25 1746)   ibuprofen (MOTRIN) tablet 600 mg (600 mg Oral Given 3/16/25 1746)       ED Risk Strat Scores                            SBIRT 20yo+      Flowsheet Row Most Recent Value   Initial Alcohol Screen: US AUDIT-C     1. How often do you have a drink containing alcohol? 0 Filed at: 03/16/2025 1641   2. How many drinks containing alcohol do you have on a typical day you are drinking?  0 Filed at: 03/16/2025 1641   3a. Male UNDER 65: How often do you have five or more drinks on one occasion? 0 Filed at: 03/16/2025 1641   3b. FEMALE Any Age, or MALE 65+: How often do you have 4 or more drinks on one occassion? 0 Filed at: 03/16/2025 1641   Audit-C Score 0 Filed at: 03/16/2025 1641   RANDY: How many times in the past year have you...    Used an illegal drug or used a prescription medication for non-medical reasons? Never Filed  "at: 2025 1641                            History of Present Illness       Chief Complaint   Patient presents with    Knee Pain     Pt ambulatory from  c/o R knee pain that started last night; reports she may have twisted it wrong getting item out of car       Past Medical History:   Diagnosis Date    Acid reflux     Acute appendicitis with localized peritonitis, without perforation, abscess, or gangrene 2025    COVID-19     GERD (gastroesophageal reflux disease)     Hyperlipidemia       Past Surgical History:   Procedure Laterality Date    APPENDECTOMY LAPAROSCOPIC N/A 2025    Procedure: APPENDECTOMY LAPAROSCOPIC;  Surgeon: Cally Mckinnon DO;  Location: [facility];  Service: General     SECTION      DILATION AND CURETTAGE OF UTERUS      2x    TONSILECTOMY AND ADNOIDECTOMY      TONSILLECTOMY      TUBAL LIGATION        Family History   Problem Relation Age of Onset    Heart disease Mother     No Known Problems Father       Social History     Tobacco Use    Smoking status: Former     Current packs/day: 0.00     Types: Cigarettes     Quit date: 2022     Years since quittin.6    Smokeless tobacco: Never   Vaping Use    Vaping status: Never Used   Substance Use Topics    Alcohol use: Not Currently     Alcohol/week: 2.0 standard drinks of alcohol     Types: 2 Glasses of wine per week     Comment: weekends mostly    Drug use: Never      E-Cigarette/Vaping    E-Cigarette Use Never User       E-Cigarette/Vaping Substances    Nicotine No     THC No     CBD No     Flavoring No     Other No     Unknown No       I have reviewed and agree with the history as documented.     Patient is a 40-year-old female who presents for evaluation of a right knee injury.  Patient says she was sitting in her car, reached back for something and may have twisted her right knee.  She says that it \"just does not feel right.\"  She was able to put some pressure on the knee but caused her discomfort.  She denies " any direct trauma to the knee.  There is no obvious swelling or deformity to the knee.        Review of Systems   Constitutional:  Negative for chills, diaphoresis and fever.   HENT:  Negative for congestion, sinus pressure, sore throat and trouble swallowing.    Eyes:  Negative for pain, discharge and itching.   Respiratory:  Negative for cough, chest tightness, shortness of breath and wheezing.    Cardiovascular:  Negative for chest pain, palpitations and leg swelling.   Gastrointestinal:  Negative for abdominal distention, abdominal pain, blood in stool, diarrhea, nausea and vomiting.   Endocrine: Negative for polyphagia and polyuria.   Genitourinary:  Negative for difficulty urinating, dysuria, flank pain, hematuria, pelvic pain and vaginal bleeding.   Musculoskeletal:  Positive for arthralgias (R knee). Negative for back pain.   Skin:  Negative for rash.   Neurological:  Negative for dizziness, syncope, weakness, light-headedness and headaches.           Objective       ED Triage Vitals [03/16/25 1641]   Temperature Pulse Blood Pressure Respirations SpO2 Patient Position - Orthostatic VS   98 °F (36.7 °C) 81 158/75 20 98 % Sitting      Temp src Heart Rate Source BP Location FiO2 (%) Pain Score    -- Monitor Left arm -- 8      Vitals      Date and Time Temp Pulse SpO2 Resp BP Pain Score FACES Pain Rating User   03/16/25 1746 -- -- -- -- -- 8 --    03/16/25 1645 -- 78 97 % -- 158/75 -- --    03/16/25 1641 98 °F (36.7 °C) 81 98 % 20 158/75 8 -- TAB            Physical Exam  Vitals and nursing note reviewed.   Constitutional:       General: She is not in acute distress.     Appearance: She is well-developed.   HENT:      Head: Normocephalic and atraumatic.   Eyes:      Conjunctiva/sclera: Conjunctivae normal.   Cardiovascular:      Rate and Rhythm: Normal rate and regular rhythm.      Heart sounds: No murmur heard.  Pulmonary:      Effort: Pulmonary effort is normal. No respiratory distress.      Breath sounds:  Normal breath sounds.   Abdominal:      Palpations: Abdomen is soft.      Tenderness: There is no abdominal tenderness.   Musculoskeletal:         General: Tenderness (R anterior/medial knee) present. No swelling or deformity.      Cervical back: Neck supple.   Skin:     General: Skin is warm and dry.      Capillary Refill: Capillary refill takes less than 2 seconds.   Neurological:      Mental Status: She is alert.   Psychiatric:         Mood and Affect: Mood normal.         Results Reviewed       None            XR knee 4+ views RIGHT   ED Interpretation by Alex Catalan DO (03/16 1723)   No acute osseous abnormality          Procedures    ED Medication and Procedure Management   Prior to Admission Medications   Prescriptions Last Dose Informant Patient Reported? Taking?   omeprazole (PriLOSEC) 40 MG capsule   No No   Sig: Take 1 capsule (40 mg total) by mouth daily      Facility-Administered Medications: None     Discharge Medication List as of 3/16/2025  5:47 PM        CONTINUE these medications which have NOT CHANGED    Details   omeprazole (PriLOSEC) 40 MG capsule Take 1 capsule (40 mg total) by mouth daily, Starting Tue 3/4/2025, Normal             ED SEPSIS DOCUMENTATION   Time reflects when diagnosis was documented in both MDM as applicable and the Disposition within this note       Time User Action Codes Description Comment    3/16/2025  5:45 PM Alex Catalan Add [S83.91XA] Right knee sprain                  Alex Catalan DO  03/16/25 1710

## 2025-03-17 ENCOUNTER — OFFICE VISIT (OUTPATIENT)
Dept: OBGYN CLINIC | Facility: CLINIC | Age: 40
End: 2025-03-17
Payer: COMMERCIAL

## 2025-03-17 VITALS — BODY MASS INDEX: 29.77 KG/M2 | WEIGHT: 168 LBS | HEIGHT: 63 IN

## 2025-03-17 DIAGNOSIS — M23.91 INTERNAL DERANGEMENT OF RIGHT KNEE: Primary | ICD-10-CM

## 2025-03-17 PROCEDURE — 99214 OFFICE O/P EST MOD 30 MIN: CPT | Performed by: STUDENT IN AN ORGANIZED HEALTH CARE EDUCATION/TRAINING PROGRAM

## 2025-03-17 RX ORDER — MELOXICAM 15 MG/1
15 TABLET ORAL DAILY
Qty: 30 TABLET | Refills: 0 | Status: SHIPPED | OUTPATIENT
Start: 2025-03-17 | End: 2025-04-16

## 2025-03-17 NOTE — PROGRESS NOTES
[facility] Visit       Assessment & Plan  Internal derangement of right knee    Orders:    XR knee 3 vw right non injury; Future    XR knee 1 or 2 vw left; Future    Ambulatory Referral to Orthopedic Surgery    MRI knee right  wo contrast; Future    meloxicam (Mobic) 15 mg tablet; Take 1 tablet (15 mg total) by mouth daily    The patient is a 40 y.o. female presenting with right knee pain and swelling for 2 days after a twisting injury. The patient endorses right knee pain, swelling, limited range of motion, difficulty weightbearing due to pain. On exam, the patient is noted to have a moderate effusion with medial joint line tenderness.  She does have limited range of motion from 5 to 90 degrees limited by pain and swelling. I reviewed the patient's imaging including radiographs of the right knee. The imaging shows no evidence of fracture, dislocation, or significant degenerative disease. Based on these findings, I discussed with the patient that I am concerned for possible medial meniscus pathology given the significant tenderness over the medial joint line with associated effusion and difficulty weightbearing as well as limited range of motion.  I recommended getting an MRI of the right knee to evaluate for medial meniscus pathology.  In the meantime I discussed that she can continue with the hinged knee brace if she feels it provides support.  I recommended icing to help with swelling.  I did discuss an aspiration to remove the fluid from the knee which would allow improvement in range of motion but the patient deferred at this time. I provided the patient with a prescription for meloxicam.  I discussed with the patient that they should stop all other anti-inflammatories while on this medication.  I also discussed that they should stop taking the medication if they develop any side effects particularly GI symptoms.  I will see the patient back after the MRI is complete to discuss the results and  further treatment options. The patient demonstrated understanding of the discussion and was in agreement with the plan.  All of the questions were answered.  Patient can reach out to clinic with any questions or concerns at any time.      Follow up: MRI review  Imaging: none      Chief Complaint   Patient presents with    Right Knee - Pain, Swelling       History of Present Illness:  The patient is a 40 y.o. female seen in clinic for right knee pain.  The pain started 2 days ago.  The patient states that she was reaching well in her car and twisted her right knee.  She did not note a pop or pain at the time.  The next day, she states that she noticed swelling of the knee and difficulty weightbearing without pain.  She also noted limited range of motion secondary to swelling and pain.  She went to the emergency department where she had x-rays that were negative for fracture.  She was provided with a brace.  She has been ambulating without any aids.  She has been taking Tylenol and ibuprofen without any improvement in her symptoms.  Patient denies any prior injuries or surgeries on the right knee.  Patient is currently a part-time .  She also enjoys going to the gym to workout daily.      Past Medical, Social and Family History:  Past Medical History:   Diagnosis Date    Acid reflux     Acute appendicitis with localized peritonitis, without perforation, abscess, or gangrene 2025    COVID-19     GERD (gastroesophageal reflux disease)     Hyperlipidemia      Past Surgical History:   Procedure Laterality Date    APPENDECTOMY LAPAROSCOPIC N/A 2025    Procedure: APPENDECTOMY LAPAROSCOPIC;  Surgeon: Cally Mckinnon DO;  Location: [facility];  Service: General     SECTION      DILATION AND CURETTAGE OF UTERUS      2x    TONSILECTOMY AND ADNOIDECTOMY      TONSILLECTOMY      TUBAL LIGATION       Allergies   Allergen Reactions    Dilaudid [Hydromorphone] Myalgia     Patient stated muscles  tightening around throat and chest after dose, and then resolved several seconds later.     Metoclopramide Myalgia     Current Outpatient Medications on File Prior to Visit   Medication Sig Dispense Refill    omeprazole (PriLOSEC) 40 MG capsule Take 1 capsule (40 mg total) by mouth daily 30 capsule 0     Current Facility-Administered Medications on File Prior to Visit   Medication Dose Route Frequency Provider Last Rate Last Admin    [COMPLETED] acetaminophen (TYLENOL) tablet 975 mg  975 mg Oral Once Alex Catalan, DO   975 mg at 25 1746    [COMPLETED] ibuprofen (MOTRIN) tablet 600 mg  600 mg Oral Once Alex R Hossein, DO   600 mg at 25 1746     Social History     Socioeconomic History    Marital status: /Civil Union     Spouse name: Not on file    Number of children: Not on file    Years of education: Not on file    Highest education level: Not on file   Occupational History    Not on file   Tobacco Use    Smoking status: Former     Current packs/day: 0.00     Types: Cigarettes     Quit date: 2022     Years since quittin.6    Smokeless tobacco: Never   Vaping Use    Vaping status: Never Used   Substance and Sexual Activity    Alcohol use: Not Currently     Alcohol/week: 2.0 standard drinks of alcohol     Types: 2 Glasses of wine per week     Comment: weekends mostly    Drug use: Never    Sexual activity: Yes     Partners: Male     Birth control/protection: Female Sterilization   Other Topics Concern    Not on file   Social History Narrative    Not on file     Social Drivers of Health     Financial Resource Strain: Not on file   Food Insecurity: No Food Insecurity (2025)    Nursing - Inadequate Food Risk Classification     Worried About Running Out of Food in the Last Year: Not on file     Ran Out of Food in the Last Year: Not on file     Ran Out of Food in the Last Year: Never true   Transportation Needs: No Transportation Needs (2025)    Nursing - Transportation Risk  Classification     Lack of Transportation: Not on file     Lack of Transportation: No   Physical Activity: Not on file   Stress: Not on file   Social Connections: Unknown (2024)    Received from [employer]     How often do you feel lonely or isolated from those around you? (Adult - for ages 18 years and over): Not on file   Intimate Partner Violence: Unknown (2025)    Nursing IPS     Feels Physically and Emotionally Safe: Not on file     Physically Hurt by Someone: Not on file     Humiliated or Emotionally Abused by Someone: Not on file     Physically Hurt by Someone: No     Hurt or Threatened by Someone: No   Housing Stability: Unknown (2025)    Nursing: Inadequate Housing Risk Classification     Has Housing: Not on file     Worried About Losing Housing: Not on file     Unable to Get Utilities: Not on file     Unable to Pay for Housing in the Last Year: No     Has Housin         I have reviewed the past medical, surgical, social and family history, medications and allergies as documented in the EMR.      Physical Exam:    Focused right knee exam:  Ambulates with a antalgic gait.    Inspection:  - Skin intact  - Ecchymosis: no  - Erythema: no  - Gross deformity: no  - Previous surgical incisions: no  - Effusion: moderate    Palpation:  - Medial patellar facet: no  - Lateral patellar facet: no  - Tibial tubercle: no  - Patella tendon: no  - Pes anserine bursa: no  - Gerdy's tubercle: no  - Popliteal fossa: no    - Lateral epicondyle: no  - Medial epicondyle: no  - Posterior calf: no    Range of motion:  - Extension: 5 degrees  - Flexion: 90 degrees    Strength:  - Patient able to perform a straight leg raise  - Hip flexion: 5/5  - Knee extension: 5/5  - Knee flexion: 5/5  - Ankle DF: 5/5  - Ankle PF:     Meniscus:  - Medial joint line tenderness: yes  - Lateral joint line tenderness: no  - Nano's: unable to assess  - Flexion compression: unable to assess  - Thessaly test:  n/a    Collateral ligaments:  -  Varus laxity at full extension: no  -  Varus laxity at full in 30° of knee flexion: no  -  Valgus laxity at full extension: no  -  Valgus laxity at full in 30° of knee flexion: no    Cruciate ligaments:  - Lachman: 1A  - Pivot shift test: no  - Anterior drawer: 1A  - Posterior drawer: 1A  - Posterior tibial sag: no    Patella:  - Apprehension with lateral patellar translation: no  - Able to sublux 2 quadrant with firm endpoint  - Apprehension with medial patellar translation: no  - Able to sublux 2 quadrant with firm endpoint  - J sign: no  - Patella grind test: no  - Patella crepitus: no  - Patella tilt: no  - Squat test: n/a    Range of motion testing of the hip and ankle are within normal limits.    No calf tenderness to palpation bilaterally. Negative Yovani test    LE NV Exam:   +2 DP/PT pulses bilaterally  Sensation intact to light touch L2-S1 bilaterally, SPN/DPN/tibial/saphenous/sural nerve distributions  Motor intact in SPN/DPN/TA nerve distributions    Knee Imaging    X-rays of the right knee were obtained on 3/16/2025 and reviewed with the patient. Per my independent review, the imaging shows No evidence of fracture, dislocation, or significant degenerative disease.      Procedures:  None      This note was written with the use of dictation software. Please excuse any errors.      Scribe Attestation      I,:   am acting as a scribe while in the presence of the attending physician.:       I,:   personally performed the services described in this documentation    as scribed in my presence.:

## 2025-03-20 ENCOUNTER — HOSPITAL ENCOUNTER (OUTPATIENT)
Dept: MAMMOGRAPHY | Facility: HOSPITAL | Age: 40
Discharge: HOME | End: 2025-03-20
Payer: COMMERCIAL

## 2025-03-20 DIAGNOSIS — Z12.31 ENCOUNTER FOR SCREENING MAMMOGRAM FOR BREAST CANCER: ICD-10-CM

## 2025-03-20 PROCEDURE — 77067 SCR MAMMO BI INCL CAD: CPT

## 2025-03-20 PROCEDURE — 77063 BREAST TOMOSYNTHESIS BI: CPT

## 2025-03-22 ENCOUNTER — HOSPITAL ENCOUNTER (OUTPATIENT)
Dept: MRI IMAGING | Facility: HOSPITAL | Age: 40
Discharge: HOME | End: 2025-03-22
Attending: STUDENT IN AN ORGANIZED HEALTH CARE EDUCATION/TRAINING PROGRAM
Payer: COMMERCIAL

## 2025-03-22 DIAGNOSIS — M23.91 INTERNAL DERANGEMENT OF RIGHT KNEE: ICD-10-CM

## 2025-03-22 PROCEDURE — 73721 MRI JNT OF LWR EXTRE W/O DYE: CPT

## 2025-03-26 ENCOUNTER — TELEPHONE (OUTPATIENT)
Age: 40
End: 2025-03-26

## 2025-03-26 NOTE — TELEPHONE ENCOUNTER
Caller: Patient     Doctor: Dr. Brewster     Reason for call: Patient rec'd MRI results via "[employer]" and she is asking what type of restrictions she has based on the results. She said it is worse than they first thought it would be. She is having a lot more pain as well. Advised her that the doctor is out of the office until her appt on 4/2 so I was not able to move it sooner.  Please advise     Call back#: [phone]

## 2025-03-26 NOTE — TELEPHONE ENCOUNTER
MRI showed ACL tear. Spoke to patient. I advised she can continue to ambulate as tolerated but should not be trying to do any sporting type activities. I advised she should continue to work on her knee ROM as well. She notes this has improved with meloxicam. She can continue to ice for swelling. She notes some increased pain at times. I advise she can add tylenol to her meloxicam (<3000 mg per day). Patient will discuss MRI results further next week with Dr. Brewster.

## 2025-03-28 DIAGNOSIS — K21.9 GASTROESOPHAGEAL REFLUX DISEASE WITHOUT ESOPHAGITIS: ICD-10-CM

## 2025-03-28 RX ORDER — OMEPRAZOLE 40 MG/1
40 CAPSULE, DELAYED RELEASE ORAL DAILY
Qty: 90 CAPSULE | Refills: 1 | Status: SHIPPED | OUTPATIENT
Start: 2025-03-28

## 2025-04-02 ENCOUNTER — OFFICE VISIT (OUTPATIENT)
Dept: OBGYN CLINIC | Facility: CLINIC | Age: 40
End: 2025-04-02
Payer: COMMERCIAL

## 2025-04-02 VITALS — BODY MASS INDEX: 23.21 KG/M2 | WEIGHT: 131 LBS

## 2025-04-02 DIAGNOSIS — M24.812 INTERNAL DERANGEMENT OF LEFT SHOULDER: ICD-10-CM

## 2025-04-02 DIAGNOSIS — S83.511A COMPLETE TEAR OF ANTERIOR CRUCIATE LIGAMENT OF RIGHT KNEE, INITIAL ENCOUNTER: Primary | ICD-10-CM

## 2025-04-02 PROCEDURE — 99214 OFFICE O/P EST MOD 30 MIN: CPT | Performed by: STUDENT IN AN ORGANIZED HEALTH CARE EDUCATION/TRAINING PROGRAM

## 2025-04-02 NOTE — PROGRESS NOTES
[facility] Clinic Visit       Assessment & Plan  Complete tear of anterior cruciate ligament of right knee, initial encounter    Orders:    Ambulatory Referral to Physical Therapy; Future    Durable Medical Equipment    Reviewed the history, exam, and imaging with the patient in clinic today.  I did review the results of the MRI which does show rupture of the ACL near the femoral attachment.  There does not appear to be any meniscus or significant chondral injury.  On exam, patient has laxity with Lachman and anterior drawer but does continue to guard.  Her motion has improved significantly compared to her prior visit.  Her effusion has also improved.  Patient has been ambulating without any aids or bracing.  She denies any incidence of instability since her injury.  I discussed with the patient the role of the ACL and the knee in terms of stability and the impact that an ACL deficiency can have on further damage to the knee including injury to the meniscus and cartilage putting her at increased risk of osteoarthritis in the future.  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  We discussed potential treatment options including conservative versus surgical management.  We discussed conservative management until medications, therapy, and potentially bracing to aid in stability of the knee.  I also discussed surgical intervention in the form of ACL reconstruction.  Given the location of the tear near the femoral attachment, there is a possibility for ACL repair as well.  I discussed with the surgery would entail as well as the recovery process.  I discussed the risk and benefits of the surgery.  I also discussed different graft options for the patient.  I discussed that ACL reconstruction could be performed with either allograft or autograft.  I discussed that given her age and activity level, the risk of rerupture is likely similar between the 2 types of grafts.  I did  discuss the for autograft that are both patella tendon and quadriceps tendon autograft options.  I discussed the risk and benefits of each graft type.  After long discussion with the patient, she mentioned that she has a vacation coming up in early June they would like to defer on surgery until after the vacation.  I did caution her to avoid activities such as running, jumping, twisting, pivoting, lateral movements and that could result in further pivot twist injuries.  The patient is going to follow-up after the MR arthrogram of her shoulder at which point we can further discuss surgical options and timing. The patient demonstrated understanding of the discussion and was in agreement with the plan.  All of the questions were answered.  Patient can reach out to clinic with any questions or concerns at any time.      Internal derangement of left shoulder    Orders:    MRI arthrogram left shoulder; Future    FL injection left shoulder (arthrogram); Future    I reviewed the history,exam, and imaging with the patient in clinic today.  Patient states that she has had left shoulder pain for years.  She was diagnosed with calcific tendinitis and ultimately underwent needle lavage.  She states that she is continue to have pain in the shoulder.  She did state that in December she had a shoulder dislocation.  Since then, she has had persistent pain and weakness in the shoulder.  On exam, she does have limited active and passive range of motion concerning for adhesive capsulitis.  She does have some weakness with testing of the rotator cuff as well.  I discussed with the patient that shoulder dislocation can potentially result in injury to the labrum as well as the rotator cuff.  I discussed that both these could be contributing to her pain and weakness.  I also discussed that she has symptoms consistent with adhesive capsulitis as well.  I discussed potential treatment options with the patient.  I discussed that we  potentially try a corticosteroid injection to the glenohumeral joint along with physical therapy to work on improving her range of motion.  I also discussed that we can get an MRI to evaluate for possible rotator cuff injury in the setting of her recent dislocation as well as prior needle lavage for calcific tendinitis.  After discussing all the options with the patient, she opted for MR arthrogram but showed likely ligament and rotator cuff.  I discussed the further treatment will depend on the results of the MR arthrogram.  She will plan to follow-up after the MR arthrogram is complete to review results and discuss further treatment options. The patient demonstrated understanding of the discussion and was in agreement with the plan.  All of the questions were answered.  Patient can reach out to clinic with any questions or concerns at any time.      Follow up: MR arthrogram review  Imaging: none      Chief Complaint   Patient presents with    Right Knee - Pain       History of Present Illness:  The patient is a 40 y.o. female seen in clinic for right knee pain. She presents to review the results of her MRI right knee. She states that her pain is well controlled on meloxicam. She has been working on home exercises focusing on range of motion. She has been weight bearing as tolerated without aids. She states she has been careful with ambulation and has not noted any instances of giving out or buckling. 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 Patient states she has an upcoming vacation to the [facility] on June 9 for 1 week.  She states she would like to defer on any surgical management until after that trip. Her home medications include omeprazole. She does not have any history diabetes or blood clots. She does not smoke.     Patient also presents today with a secondary complaint of left shoulder pain. She has history of calcific tendinitis in the left shoulder which was previously managed  by Dr. Ramirez.  Patient states she did receive needle lavage treatment  in  which made her symptoms worse.  She does note 1 instance of a left shoulder dislocation that occurred 2024.  Since that incident, she has had constant pain in the left shoulder with associated limited range of motion.  She has not had an MRI of the left shoulder at this point. She denies any numbness or tingling in the left upper extremity.      Prior history:  The patient states that she was reaching well in her car and twisted her right knee.  She did not note a pop or pain at the time.  The next day, she states that she noticed swelling of the knee and difficulty weightbearing without pain.  She also noted limited range of motion secondary to swelling and pain.  She went to the emergency department where she had x-rays that were negative for fracture.  She was provided with a brace.  She has been ambulating without any aids.  She has been taking Tylenol and ibuprofen without any improvement in her symptoms.  Patient denies any prior injuries or surgeries on the right knee.  Patient is currently a part-time .  She also enjoys going to the gym to workout daily.    DOI: 3/15/2025  Occupation:       Past Medical, Social and Family History:  Past Medical History:   Diagnosis Date    Acid reflux     Acute appendicitis with localized peritonitis, without perforation, abscess, or gangrene 2025    COVID-19     GERD (gastroesophageal reflux disease)     Hyperlipidemia      Past Surgical History:   Procedure Laterality Date    APPENDECTOMY LAPAROSCOPIC N/A 2025    Procedure: APPENDECTOMY LAPAROSCOPIC;  Surgeon: Cally Mckinnon DO;  Location: [facility];  Service: General     SECTION      DILATION AND CURETTAGE OF UTERUS      2x    TONSILECTOMY AND ADNOIDECTOMY      TONSILLECTOMY      TUBAL LIGATION       Allergies   Allergen Reactions    Dilaudid [Hydromorphone] Myalgia     Patient stated  muscles tightening around throat and chest after dose, and then resolved several seconds later.     Metoclopramide Myalgia     Current Outpatient Medications on File Prior to Visit   Medication Sig Dispense Refill    meloxicam (Mobic) 15 mg tablet Take 1 tablet (15 mg total) by mouth daily 30 tablet 0    omeprazole (PriLOSEC) 40 MG capsule take 1 capsule by mouth once daily 90 capsule 1     No current facility-administered medications on file prior to visit.     Social History     Socioeconomic History    Marital status: /Civil Union     Spouse name: Not on file    Number of children: Not on file    Years of education: Not on file    Highest education level: Not on file   Occupational History    Not on file   Tobacco Use    Smoking status: Former     Current packs/day: 0.00     Types: Cigarettes     Quit date: 2022     Years since quittin.6    Smokeless tobacco: Never   Vaping Use    Vaping status: Never Used   Substance and Sexual Activity    Alcohol use: Not Currently     Alcohol/week: 2.0 standard drinks of alcohol     Types: 2 Glasses of wine per week     Comment: weekends mostly    Drug use: Never    Sexual activity: Yes     Partners: Male     Birth control/protection: Female Sterilization   Other Topics Concern    Not on file   Social History Narrative    Not on file     Social Drivers of Health     Financial Resource Strain: Not on file   Food Insecurity: No Food Insecurity (2025)    Nursing - Inadequate Food Risk Classification     Worried About Running Out of Food in the Last Year: Not on file     Ran Out of Food in the Last Year: Not on file     Ran Out of Food in the Last Year: Never true   Transportation Needs: No Transportation Needs (2025)    Nursing - Transportation Risk Classification     Lack of Transportation: Not on file     Lack of Transportation: No   Physical Activity: Not on file   Stress: Not on file   Social Connections: Unknown (2024)    Received from [employer]     Social Connections     How often do you feel lonely or isolated from those around you? (Adult - for ages 18 years and over): Not on file   Intimate Partner Violence: Unknown (2025)    Nursing IPS     Feels Physically and Emotionally Safe: Not on file     Physically Hurt by Someone: Not on file     Humiliated or Emotionally Abused by Someone: Not on file     Physically Hurt by Someone: No     Hurt or Threatened by Someone: No   Housing Stability: Unknown (2025)    Nursing: Inadequate Housing Risk Classification     Has Housing: Not on file     Worried About Losing Housing: Not on file     Unable to Get Utilities: Not on file     Unable to Pay for Housing in the Last Year: No     Has Housin         I have reviewed the past medical, surgical, social and family history, medications and allergies as documented in the EMR.      Physical Exam:    Focused right knee exam:  Ambulates with a antalgic gait.    Inspection:  - Skin intact  - Ecchymosis: no  - Erythema: no  - Gross deformity: no  - Previous surgical incisions: no  - Effusion: mild    Palpation:  - Medial patellar facet: no  - Lateral patellar facet: no  - Tibial tubercle: no  - Patella tendon: no  - Pes anserine bursa: no  - Gerdy's tubercle: no  - Popliteal fossa: no    - Lateral epicondyle: no  - Medial epicondyle: no  - Posterior calf: no    Range of motion:  - Extension: 2 degrees  - Flexion: 110 degrees    Strength:  - Patient able to perform a straight leg raise  - Hip flexion: 5/5  - Knee extension: 5/5  - Knee flexion: 5/5  - Ankle DF: 5/5  - Ankle PF: 5/5    Meniscus:  - Medial joint line tenderness: no  - Lateral joint line tenderness: no  - Nano's: no  - Flexion compression: no  - Thessaly test: n/a    Collateral ligaments:  -  Varus laxity at full extension: no  -  Varus laxity at full in 30° of knee flexion: no  -  Valgus laxity at full extension: no  -  Valgus laxity at full in 30° of knee flexion: no    Cruciate ligaments:  -  Lachman: 1B with guarding  - Pivot shift test: deferred due to guarding  - Anterior drawer: 1B with guarding  - Posterior drawer: 1A  - Posterior tibial sag: no    Patella:  - Apprehension with lateral patellar translation: no  - Able to sublux 2 quadrant with firm endpoint  - Apprehension with medial patellar translation: no  - Able to sublux 2 quadrant with firm endpoint  - J sign: no  - Patella grind test: no  - Patella crepitus: no  - Patella tilt: no  - Squat test: n/a    Range of motion testing of the hip and ankle are within normal limits.    No calf tenderness to palpation bilaterally. Negative Yovani test    LE NV Exam:   +2 DP/PT pulses bilaterally  Sensation intact to light touch L2-S1 bilaterally, SPN/DPN/tibial/saphenous/sural nerve distributions  Motor intact in SPN/DPN/TA nerve distributions      Focused left  shoulder exam:  No paracervical tenderness.   No cervical tenderness.   No pain with neck flexion, extension, side-to-side bending, or rotation.   Negative Spurling's bilaterally.    Inspection:  - Skin: intact, no erythema or ecchymosis, no open wounds  - Atrophy of supraspinatus or infraspinatus: no  - Scapular winging: no  - Scapular positioning: normal    Tenderness to Palpation:  - SC joint: no  - Clavicle: no  - Lateral aspect of acromion: no  - Posterior joint line: no  - AC joint: no  - Bicipital groove: no    Shoulder ROM:  - Passive forward flexion: 90 degrees  - Active forward flexion: 90 degrees  - Passive external rotation: 15 degrees  - Active external rotation: 15 degrees  - Internal rotation to: L5  - Passive internal rotation with 90 degrees abduction: 20 degrees    Strength testing:  - Empty can: 5/5 with pain  - Resisted external rotation: 5/5  - Resisted internal rotation: 5/5 with pain  - Belly press: negative  - Bear hug test: n/a  - Hornblower: n/a  - External rotation lag sign: negative    Impingement:  - Hawkin's impingement test: negative  - Neer impingement sign:  negative    Biceps testing:  - Yeagerson: negative  - Speeds: negative    Stability:  - Apprehension sign: negative  - Relocation test: negative  - Anterior load and shift: negative  - Posterior load and shift: negative  - Rachel test: negative  - Colfax test: negative  - Sulcus sign: negative      UE NV Exam:   +2 Radial pulses bilaterally.   Fingers are warm and well-perfused.  SILT C5-T1, SILT median, ulnar, radial nerve distributions  Radial/median/ulnar/PIN/AIN motor intact       Knee Imaging    X-rays of the right knee were obtained on 3/16/2025 and reviewed with the patient. Per my independent review, the imaging shows No evidence of fracture, dislocation, or significant degenerative disease.    MRI of the right knee were obtained on 3/22/2025 and reviewed with the patient. Per my independent review, the imaging shows complete rupture of the ACL near the femoral attachment. No meniscus tears noted. PCL intact. No chondral injuries noted.      Procedures:  None      This note was written with the use of dictation software. Please excuse any errors.      Scribe Attestation      I,:  Curt Freedman PA-C am acting as a scribe while in the presence of the attending physician.:       I,:  Tony Brewster MD personally performed the services described in this documentation    as scribed in my presence.:

## 2025-04-02 NOTE — ASSESSMENT & PLAN NOTE
Orders:    Ambulatory Referral to Physical Therapy; Future    Durable Medical Equipment    Reviewed the history, exam, and imaging with the patient in clinic today.  I did review the results of the MRI which does show rupture of the ACL near the femoral attachment.  There does not appear to be any meniscus or significant chondral injury.  On exam, patient has laxity with Lachman and anterior drawer but does continue to guard.  Her motion has improved significantly compared to her prior visit.  Her effusion has also improved.  Patient has been ambulating without any aids or bracing.  She denies any incidence of instability since her injury.  I discussed with the patient the role of the ACL and the knee in terms of stability and the impact that an ACL deficiency can have on further damage to the knee including injury to the meniscus and cartilage putting her at increased risk of osteoarthritis in the future.  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  We discussed potential treatment options including conservative versus surgical management.  We discussed conservative management until medications, therapy, and potentially bracing to aid in stability of the knee.  I also discussed surgical intervention in the form of ACL reconstruction.  Given the location of the tear near the femoral attachment, there is a possibility for ACL repair as well.  I discussed with the surgery would entail as well as the recovery process.  I discussed the risk and benefits of the surgery.  I also discussed different graft options for the patient.  I discussed that ACL reconstruction could be performed with either allograft or autograft.  I discussed that given her age and activity level, the risk of rerupture is likely similar between the 2 types of grafts.  I did discuss the for autograft that are both patella tendon and quadriceps tendon autograft options.  I discussed the risk and benefits of each  graft type.  After long discussion with the patient, she mentioned that she has a vacation coming up in early June they would like to defer on surgery until after the vacation.  I did caution her to avoid activities such as running, jumping, twisting, pivoting, lateral movements and that could result in further pivot twist injuries.  The patient is going to follow-up after the MR arthrogram of her shoulder at which point we can further discuss surgical options and timing. The patient demonstrated understanding of the discussion and was in agreement with the plan.  All of the questions were answered.  Patient can reach out to clinic with any questions or concerns at any time.

## 2025-04-03 NOTE — NURSING NOTE
Call placed to patient to discuss upcoming appointment at [facility] radiology department and complete consultation with patient. Patient is having a left shoulder MRI arthrogram utilizing fluoroscopy  guidance. Reviewed patient's allergies, no current anticoagulant medication present per patient , also discussed the pre and post procedure expectations. Patient made aware of need for  post procedure if anti anxiety medication is taken, per patient she will  be taking medications for the MRI portion of the study and has a . Reminded patient of location and time expected for procedure, Patient expressed understanding by verbalizing and repeating instructions.

## 2025-04-09 ENCOUNTER — EVALUATION (OUTPATIENT)
Dept: PHYSICAL THERAPY | Facility: CLINIC | Age: 40
End: 2025-04-09
Payer: COMMERCIAL

## 2025-04-09 DIAGNOSIS — S83.511A COMPLETE TEAR OF ANTERIOR CRUCIATE LIGAMENT OF RIGHT KNEE, INITIAL ENCOUNTER: ICD-10-CM

## 2025-04-09 PROCEDURE — 97161 PT EVAL LOW COMPLEX 20 MIN: CPT | Performed by: PHYSICAL THERAPIST

## 2025-04-09 PROCEDURE — 97140 MANUAL THERAPY 1/> REGIONS: CPT | Performed by: PHYSICAL THERAPIST

## 2025-04-09 PROCEDURE — 97110 THERAPEUTIC EXERCISES: CPT | Performed by: PHYSICAL THERAPIST

## 2025-04-09 NOTE — PROGRESS NOTES
PT Evaluation     Today's date: 2025  Patient name: Cristal Devine  : 1985  MRN: [id number]  Referring provider: Tony Brewster MD  Dx:   Encounter Diagnosis     ICD-10-CM    1. Complete tear of anterior cruciate ligament of right knee, initial encounter  S83.511A Ambulatory Referral to Physical Therapy                     Assessment  Impairments: abnormal coordination, abnormal gait, abnormal muscle firing, abnormal or restricted ROM, abnormal movement, activity intolerance, impaired physical strength, lacks appropriate home exercise program, pain with function, safety issue and weight-bearing intolerance  Functional limitations: difficulty with amb, stair negotiation, recreational activites.  Symptom irritability: moderate    Assessment details: Cristal Devine is a 40 y.o. female who presents to outpatient PT with a  Complete tear of anterior cruciate ligament of right knee, initial encounter.  No further referral appears necessary at this time based upon examination results. Pt presents with decreased strength, ROM, balance, functional activity tolerance, and pain with movement in  her right knee  which is  limiting her ability to perform the aforementioned functional activities.  Etiologic factors include repetitive poor body mechanics. Prognosis is good given HEP compliance and PT 2-3/wk.  Please contact me if you have any questions or recommendations.  Thank you for the opportunity to share in  Cristal care.     Understanding of Dx/Px/POC: good     Prognosis: good    Goals  STG to be achieved in 4 weeks     1. Pt will reduce subjective pain rating by at least 50 percent the help facilitate return to PLOF  2. Pt will improve right knee AROM by at least 10 degrees to help promote improved functional activity tolerance   3. Pt will improve right knee MMT scores by at least 1/2 grade to promote improved functional activity tolerance      LTG to be achieved in 6-8 weeks   1. Pt will be complaint with  HEP   2. Pt will improve right knee AROM to WFL, to help facilitate independence with ADL's, IADL's, and functional activities   3. Pt will improve right knee Strength to WFL to help facilitate independence with ADL's, IADL's, and functional activities   4. Pt will have no limitations with ambulation to help facilitate independence at home and in the community.   5. Pt will have no limitations with stair negotiation to help facilitate independence at home and in the community           Plan  Patient would benefit from: skilled physical therapy    Planned therapy interventions: ADL training, balance, balance/weight bearing training, flexibility, functional ROM exercises, gait training, graded activity, graded exercise, graded motor, home exercise program, joint mobilization, manual therapy, neuromuscular re-education, patient education, postural training, strengthening, stretching, therapeutic activities and therapeutic exercise    Frequency: 2x week  Duration in weeks: 12  Plan of Care beginning date: 4/9/2025  Plan of Care expiration date: 7/9/2025  Treatment plan discussed with: patient, PTA and referring physician        Subjective Evaluation    History of Present Illness  Mechanism of injury: 40 year old female presents with CC of right knee pain for the past month. She reports she heard a pop in her right knee, after she sat down and twisted in her car. She notes she has swelling in the knee     MRI shows     1.  Complete tear of anterior cruciate ligament.  2.  Small joint effusion.  3.  Intact menisci.  4. Minimal bone marrow edema/contusion in the posterior aspect of lateral tibial plateau.    The patient will be deferring surgery at this point until she returns from vacation in June,  She in apprehensive about getting the ACL repaired.       She is well in the office today.       Patient Goals  Patient goals for therapy: increased strength, decreased pain and increased motion  Patient goal: to strengthen  the muscles around the knee.  Pain  Current pain ratin  At best pain ratin  At worst pain rating: 10  Quality: sharp  Relieving factors: change in position, relaxation, rest and support  Aggravating factors: standing, walking and stair climbing  Progression: improved          Objective     Active Range of Motion     Right Knee   Flexion: 120 degrees   Extension: -3 degrees     Strength/Myotome Testing     Right Knee   Flexion: 3+  Extension: 3+    Tests     Right Knee   Positive anterior drawer and anterior Lachman.   Negative lateral Nano, medial Nano, valgus stress test at 0 degrees, valgus stress test at 30 degrees, varus stress test at 0 degrees and varus stress test at 30 degrees.                  POC Expires Reeval for Medicare to be completed Unit LImit Auth Expiration Date PT/OT/STVisit Limit    By visit         Completed on                  VISIT TRACKER  DATE             Visit # 1            Remaining 9               Precautions; Right ACL tear non operative       Manuals             PROM right knee  RR                                                   Neuro Re-Ed             Hip add             Hip abd              Bridge              SLR flex abd              LAQ 90-40             Mini Squat              Leg Press              SL Leg Press             SL balance                                       Ther Ex             Nu step for LE strength  L3 x 10 min                                                                                                        Ther Activity                                       Gait Training                                       Modalities

## 2025-04-14 ENCOUNTER — HOSPITAL ENCOUNTER (OUTPATIENT)
Dept: RADIOLOGY | Facility: HOSPITAL | Age: 40
Discharge: HOME | End: 2025-04-14
Payer: COMMERCIAL

## 2025-04-14 DIAGNOSIS — M24.812 INTERNAL DERANGEMENT OF LEFT SHOULDER: ICD-10-CM

## 2025-04-14 PROCEDURE — 73222 MRI JOINT UPR EXTREM W/DYE: CPT

## 2025-04-14 PROCEDURE — A9585 GADOBUTROL INJECTION: HCPCS

## 2025-04-14 PROCEDURE — 77002 NEEDLE LOCALIZATION BY XRAY: CPT

## 2025-04-14 PROCEDURE — 23350 INJECTION FOR SHOULDER X-RAY: CPT

## 2025-04-14 RX ORDER — GADOBUTROL 604.72 MG/ML
2 INJECTION INTRAVENOUS
Status: COMPLETED | OUTPATIENT
Start: 2025-04-14 | End: 2025-04-14

## 2025-04-14 RX ORDER — LIDOCAINE HYDROCHLORIDE 10 MG/ML
5 INJECTION, SOLUTION EPIDURAL; INFILTRATION; INTRACAUDAL; PERINEURAL
Status: COMPLETED | OUTPATIENT
Start: 2025-04-14 | End: 2025-04-14

## 2025-04-14 RX ORDER — SODIUM CHLORIDE 9 MG/ML
5 INJECTION INTRAVENOUS
Status: DISCONTINUED | OUTPATIENT
Start: 2025-04-14 | End: 2025-04-15 | Stop reason: HOSPADM

## 2025-04-14 RX ORDER — SODIUM CHLORIDE 9 MG/ML
50 INJECTION INTRAVENOUS
Status: COMPLETED | OUTPATIENT
Start: 2025-04-14 | End: 2025-04-14

## 2025-04-14 RX ORDER — ROPIVACAINE HYDROCHLORIDE 2 MG/ML
10 INJECTION, SOLUTION EPIDURAL; INFILTRATION; PERINEURAL ONCE
Status: COMPLETED | OUTPATIENT
Start: 2025-04-14 | End: 2025-04-14

## 2025-04-14 RX ADMIN — IOHEXOL 1 ML: 300 INJECTION, SOLUTION INTRAVENOUS at 13:49

## 2025-04-14 RX ADMIN — SODIUM CHLORIDE 12 ML: 9 INJECTION, SOLUTION INTRAMUSCULAR; INTRAVENOUS; SUBCUTANEOUS at 13:52

## 2025-04-14 RX ADMIN — ROPIVACAINE HYDROCHLORIDE 2 ML: 2 INJECTION, SOLUTION EPIDURAL; INFILTRATION at 13:53

## 2025-04-14 RX ADMIN — LIDOCAINE HYDROCHLORIDE 4 ML: 10 INJECTION, SOLUTION EPIDURAL; INFILTRATION; INTRACAUDAL; PERINEURAL at 13:53

## 2025-04-14 RX ADMIN — GADOBUTROL 0.2 ML: 604.72 INJECTION INTRAVENOUS at 13:50

## 2025-04-15 ENCOUNTER — OFFICE VISIT (OUTPATIENT)
Dept: PHYSICAL THERAPY | Facility: CLINIC | Age: 40
End: 2025-04-15
Payer: COMMERCIAL

## 2025-04-15 DIAGNOSIS — S83.511A COMPLETE TEAR OF ANTERIOR CRUCIATE LIGAMENT OF RIGHT KNEE, INITIAL ENCOUNTER: Primary | ICD-10-CM

## 2025-04-15 PROCEDURE — 97110 THERAPEUTIC EXERCISES: CPT | Performed by: PHYSICAL THERAPIST

## 2025-04-15 PROCEDURE — 97112 NEUROMUSCULAR REEDUCATION: CPT | Performed by: PHYSICAL THERAPIST

## 2025-04-15 NOTE — PROGRESS NOTES
Daily Note     Today's date: 4/15/2025  Patient name: Cristal Devine  : 1985  MRN: [id number]  Referring provider: Tony Brewster MD  Dx:   Encounter Diagnosis     ICD-10-CM    1. Complete tear of anterior cruciate ligament of right knee, initial encounter  S83.511A                      Subjective: Pt reports she is ok today. Right knee is ok      Objective: See treatment diary below      Assessment: Tolerated treatment well. Patient exhibited good technique with therapeutic exercises and would benefit from continued PT. Good tolerance to initial PT treatment.       Plan: Continue per plan of care.        POC Expires Reeval for Medicare to be completed Unit LImit Auth Expiration Date PT/OT/STVisit Limit    By visit         Completed on                  VISIT TRACKER  DATE 4/9 4/15            Visit # 1 2           Remaining 9 8              Precautions; Right ACL tear non operative       Manuals 4/9 4/15           PROM right knee  RR RR                                                  Neuro Re-Ed             Hip add  5''30x           Hip abd   5''30x           Bridge   5''30x           SLR flex abd   2x10 Flex abd            LAQ 90-40  Full AROM 2x10           Mini Squat   2x10           Leg Press              SL Leg Press             SL balance                                       Ther Ex             Nu step for LE strength  L3 x 10 min  L3 x 10 min            HR TR   20x           Step   B 2x10 each            Mini Squat   2x10                                                               Ther Activity                                       Gait Training                                       Modalities

## 2025-04-17 ENCOUNTER — OFFICE VISIT (OUTPATIENT)
Dept: PHYSICAL THERAPY | Facility: CLINIC | Age: 40
End: 2025-04-17
Payer: COMMERCIAL

## 2025-04-17 DIAGNOSIS — S83.511A COMPLETE TEAR OF ANTERIOR CRUCIATE LIGAMENT OF RIGHT KNEE, INITIAL ENCOUNTER: Primary | ICD-10-CM

## 2025-04-17 PROCEDURE — 97110 THERAPEUTIC EXERCISES: CPT | Performed by: PHYSICAL THERAPIST

## 2025-04-17 PROCEDURE — 97112 NEUROMUSCULAR REEDUCATION: CPT | Performed by: PHYSICAL THERAPIST

## 2025-04-17 NOTE — PROGRESS NOTES
Daily Note     Today's date: 2025  Patient name: Cristal Devine  : 1985  MRN: [id number]  Referring provider: Tony Brewster MD  Dx:   Encounter Diagnosis     ICD-10-CM    1. Complete tear of anterior cruciate ligament of right knee, initial encounter  S83.511A                      Subjective: Pt reports she is ok. She was mildly sore after first session      Objective: See treatment diary below      Assessment: Tolerated treatment well. Patient exhibited good technique with therapeutic exercises and would benefit from continued PT      Plan: Continue per plan of care.        POC Expires Reeval for Medicare to be completed Unit LImit Auth Expiration Date PT/OT/STVisit Limit    By visit         Completed on                  VISIT TRACKER  DATE 4/9 4/15  4/17           Visit # 1 2 3          Remaining 9 8 7             Precautions; Right ACL tear non operative       Manuals 4/9 4/15 4/17          PROM right knee  RR RR RR                                                 Neuro Re-Ed             Hip add  5''30x 5''30x          Hip abd   5''30x 5''30x          Bridge   5''30x 5''30x          SLR flex abd   2x10 Flex abd  2x10 flexion abduction           LAQ 90-40  Full AROM 2x10 Full AROM 2x10          Mini Squat   2x10 2x10          Leg Press              SL Leg Press             SL balance                                       Ther Ex             Nu step for LE strength  L3 x 10 min  L3 x 10 min  L3 x10 min           HR TR   20x 20x           Step   B 2x10 each  B 2x10 each           Mini Squat   2x10 2x10                                                              Ther Activity                                       Gait Training                                       Modalities

## 2025-04-21 ENCOUNTER — APPOINTMENT (OUTPATIENT)
Dept: PHYSICAL THERAPY | Facility: CLINIC | Age: 40
End: 2025-04-21
Payer: COMMERCIAL

## 2025-04-21 DIAGNOSIS — S83.511A COMPLETE TEAR OF ANTERIOR CRUCIATE LIGAMENT OF RIGHT KNEE, INITIAL ENCOUNTER: Primary | ICD-10-CM

## 2025-04-21 NOTE — PROGRESS NOTES
Daily Note     Today's date: 2025  Patient name: Cristal Devine  : 1985  MRN: [id number]  Referring provider: Tony Brewster MD  Dx:   Encounter Diagnosis     ICD-10-CM    1. Complete tear of anterior cruciate ligament of right knee, initial encounter  S83.511A                      Subjective: ***      Objective: See treatment diary below      Assessment: Tolerated treatment {Tolerated treatment :8041225731}. Patient {assessment:[id number]}      Plan: {PLAN:5260330390}       POC Expires Reeval for Medicare to be completed Unit LImit Auth Expiration Date PT/OT/STVisit Limit    By visit         Completed on                  VISIT TRACKER  DATE 4/9 4/15  4/17           Visit # 1 2 3          Remaining 9 8 7             Precautions; Right ACL tear non operative       Manuals 4/9 4/15 4/17          PROM right knee  RR RR RR                                                 Neuro Re-Ed             Hip add  5''30x 5''30x          Hip abd   5''30x 5''30x          Bridge   5''30x 5''30x          SLR flex abd   2x10 Flex abd  2x10 flexion abduction           LAQ 90-40  Full AROM 2x10 Full AROM 2x10          Mini Squat   2x10 2x10          Leg Press              SL Leg Press             SL balance                                       Ther Ex             Nu step for LE strength  L3 x 10 min  L3 x 10 min  L3 x10 min           HR TR   20x 20x           Step   B 2x10 each  B 2x10 each           Mini Squat   2x10 2x10                                                              Ther Activity                                       Gait Training                                       Modalities

## 2025-04-23 ENCOUNTER — OFFICE VISIT (OUTPATIENT)
Dept: OBGYN CLINIC | Facility: CLINIC | Age: 40
End: 2025-04-23
Payer: COMMERCIAL

## 2025-04-23 VITALS — WEIGHT: 171 LBS | BODY MASS INDEX: 30.3 KG/M2 | HEIGHT: 63 IN

## 2025-04-23 DIAGNOSIS — S83.511A COMPLETE TEAR OF ANTERIOR CRUCIATE LIGAMENT OF RIGHT KNEE, INITIAL ENCOUNTER: Primary | ICD-10-CM

## 2025-04-23 DIAGNOSIS — M75.32 CALCIFIC TENDINITIS OF LEFT SHOULDER: ICD-10-CM

## 2025-04-23 DIAGNOSIS — M75.02 ADHESIVE CAPSULITIS OF LEFT SHOULDER: ICD-10-CM

## 2025-04-23 PROCEDURE — 99214 OFFICE O/P EST MOD 30 MIN: CPT | Performed by: STUDENT IN AN ORGANIZED HEALTH CARE EDUCATION/TRAINING PROGRAM

## 2025-04-23 NOTE — PROGRESS NOTES
Ortho Sports Medicine Clinic Visit       Assessment & Plan  Complete tear of anterior cruciate ligament of right knee, initial encounter       I reviewed the history and exam with the patient in clinic today.  The patient has a doing physical therapy and has regained her range of motion.  She has a minimal effusion.  She has been wearing the knee brace intermittently and has had no further instability events.  I discussed at this point she can discontinue physical therapy as patient does go to the gym on her own and would prefer to do a home exercise program.  We did discuss surgical intervention.  The patient mentioned that she has a vacation in June and will be helping her daughter move to college in August.  She would like to postpone any surgery on her knee until after that.  I did discuss that delay of surgery can result in further injury to the knee including meniscal or chondral pathology if she does have further instability events.  I did discuss the patient could wear the knee brace to help with stability when doing more active activities.  I discussed that she should avoid running, jumping, quick pivoting and twisting activities that can result in further pivot shift injuries.  I will see the patient back in 6 weeks for repeat evaluation and to discuss surgical timing. The patient demonstrated understanding of the discussion and was in agreement with the plan.  All of the questions were answered.  Patient can reach out to clinic with any questions or concerns at any time.      Adhesive capsulitis of left shoulder    Orders:    Ambulatory Referral to Orthopedic Surgery; Future    Ambulatory Referral to Physical Therapy; Future      I reviewed the history, exam, and imaging with the patient in clinic today.  I did review the patient's recent MR arthrogram which does not show any evidence of labral pathology, Hill-Sachs lesion, chondral injury, or right rotator cuff injury.  Patient complains of pain and  stiffness in the left shoulder for 5 months with injury in December 2024. On exam, patient has limited active and passive range of motion.  I discussed that the patient's condition is consistent with adhesive capsulitis given the limited active and passive range of motion of the shoulder.  I discussed the etiology of adhesive capsulitis and potential treatment options. I discussed with the patient that a corticosteroid injection under ultrasound guidance into the glenohumeral joint followed by physical therapy focusing on shoulder range of motion would be recommended. Patient was provided a referral for the ultrasound-guided glenohumeral joint corticosteroid injection with Dr Morley.  Patient was also provided a prescription for physical therapy and instructed to begin treatment 3-4 days after receiving the ultrasound-guided glenohumeral joint corticosteroid injection. I discussed that most patients have a good response to this treatment with return of full range of motion.  However, some patients do require a second injection at approximately 3 months to fully regain range of motion.  I discussed that in very rare instances, surgery is needed to do a capsular release arthroscopically. Patient will follow-up in 6 weeks for repeat evaluation. The patient demonstrated understanding of the discussion and was in agreement with the plan.  All of the questions were answered.  Patient can reach out to clinic with any questions or concerns at any time.     Follow up: 6 weeks  Imaging: none      Chief Complaint   Patient presents with    Left Shoulder - Pain, Follow-up       History of Present Illness:  The patient is a 40 y.o. female seen in clinic for right knee pain and left shoulder pain.  Patient was previously diagnosed with a right knee ACL tear.  Patient has been doing physical therapy to regain motion.  She states that she has got her motion back.  She is wearing a brace intermittently and being careful with her  movements to avoid any further injury to the knee.  Patient was also concerned about left shoulder pain.  She had a history of calcific tendinitis and underwent needle lavage.  She states that she has had pain since the procedure as well as limited range of motion and weakness in the shoulder.  In addition, the patient stated that she had a dislocation of the shoulder while at the [employer] that spontaneously reduced.  Given this, the patient was sent for an MR arthrogram of the left shoulder.  She is following up to review the results of the shoulder MRI.    Prior history 4/2/25:   She presents to review the results of her MRI right knee. She states that her pain is well controlled on meloxicam. She has been working on home exercises focusing on range of motion. She has been weight bearing as tolerated without aids. She states she has been careful with ambulation and has not noted any instances of giving out or buckling. 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 Patient states she has an upcoming vacation to the [facility] on June 9 for 1 week.  She states she would like to defer on any surgical management until after that trip. Her home medications include omeprazole. She does not have any history diabetes or blood clots. She does not smoke.     Patient also presents today with a secondary complaint of left shoulder pain. She has history of calcific tendinitis in the left shoulder which was previously managed by Dr. Ramirez.  Patient states she did receive needle lavage treatment  in 2023 which made her symptoms worse.  She does note 1 instance of a left shoulder dislocation that occurred December 2024.  Since that incident, she has had constant pain in the left shoulder with associated limited range of motion.  She has not had an MRI of the left shoulder at this point. She denies any numbness or tingling in the left upper extremity.      Prior history 3/17/25:  The patient states  that she was reaching well in her car and twisted her right knee.  She did not note a pop or pain at the time.  The next day, she states that she noticed swelling of the knee and difficulty weightbearing without pain.  She also noted limited range of motion secondary to swelling and pain.  She went to the emergency department where she had x-rays that were negative for fracture.  She was provided with a brace.  She has been ambulating without any aids.  She has been taking Tylenol and ibuprofen without any improvement in her symptoms.  Patient denies any prior injuries or surgeries on the right knee.  Patient is currently a part-time .  She also enjoys going to the gym to workout daily.    DOI: 3/15/2025  Occupation:       Past Medical, Social and Family History:  Past Medical History:   Diagnosis Date    Acid reflux     Acute appendicitis with localized peritonitis, without perforation, abscess, or gangrene 2025    COVID-19     GERD (gastroesophageal reflux disease)     Hyperlipidemia      Past Surgical History:   Procedure Laterality Date    APPENDECTOMY LAPAROSCOPIC N/A 2025    Procedure: APPENDECTOMY LAPAROSCOPIC;  Surgeon: Cally Mckinnon DO;  Location: [facility];  Service: General     SECTION      DILATION AND CURETTAGE OF UTERUS      2x    FL INJECTION LEFT SHOULDER (ARTHROGRAM)  2025    TONSILECTOMY AND ADNOIDECTOMY      TONSILLECTOMY      TUBAL LIGATION       Allergies   Allergen Reactions    Dilaudid [Hydromorphone] Myalgia     Patient stated muscles tightening around throat and chest after dose, and then resolved several seconds later.     Metoclopramide Myalgia     Current Outpatient Medications on File Prior to Visit   Medication Sig Dispense Refill    omeprazole (PriLOSEC) 40 MG capsule take 1 capsule by mouth once daily 90 capsule 1    meloxicam (Mobic) 15 mg tablet Take 1 tablet (15 mg total) by mouth daily 30 tablet 0     No current facility-administered  medications on file prior to visit.     Social History     Socioeconomic History    Marital status: /Civil Union     Spouse name: Not on file    Number of children: Not on file    Years of education: Not on file    Highest education level: Not on file   Occupational History    Not on file   Tobacco Use    Smoking status: Former     Current packs/day: 0.00     Types: Cigarettes     Quit date: 2022     Years since quittin.7    Smokeless tobacco: Never   Vaping Use    Vaping status: Never Used   Substance and Sexual Activity    Alcohol use: Not Currently     Alcohol/week: 2.0 standard drinks of alcohol     Types: 2 Glasses of wine per week     Comment: weekends mostly    Drug use: Never    Sexual activity: Yes     Partners: Male     Birth control/protection: Female Sterilization   Other Topics Concern    Not on file   Social History Narrative    Not on file     Social Drivers of Health     Financial Resource Strain: Not on file   Food Insecurity: No Food Insecurity (2025)    Nursing - Inadequate Food Risk Classification     Worried About Running Out of Food in the Last Year: Not on file     Ran Out of Food in the Last Year: Not on file     Ran Out of Food in the Last Year: Never true   Transportation Needs: No Transportation Needs (2025)    Nursing - Transportation Risk Classification     Lack of Transportation: Not on file     Lack of Transportation: No   Physical Activity: Not on file   Stress: Not on file   Social Connections: Unknown (2024)    Received from [facility]    [employer]     How often do you feel lonely or isolated from those around you? (Adult - for ages 18 years and over): Not on file   Intimate Partner Violence: Unknown (2025)    Nursing IPS     Feels Physically and Emotionally Safe: Not on file     Physically Hurt by Someone: Not on file     Humiliated or Emotionally Abused by Someone: Not on file     Physically Hurt by Someone: No     Hurt or Threatened by  Someone: No   Housing Stability: Unknown (2025)    Nursing: Inadequate Housing Risk Classification     Has Housing: Not on file     Worried About Losing Housing: Not on file     Unable to Get Utilities: Not on file     Unable to Pay for Housing in the Last Year: No     Has Housin         I have reviewed the past medical, surgical, social and family history, medications and allergies as documented in the EMR.      Physical Exam:    Focused right knee exam:  Ambulates with a antalgic gait.    Inspection:  - Skin intact  - Ecchymosis: no  - Erythema: no  - Gross deformity: no  - Previous surgical incisions: no  - Effusion: mild    Palpation:  - Medial patellar facet: no  - Lateral patellar facet: no  - Tibial tubercle: no  - Patella tendon: no  - Pes anserine bursa: no  - Gerdy's tubercle: no  - Popliteal fossa: no    - Lateral epicondyle: no  - Medial epicondyle: no  - Posterior calf: no    Range of motion:  - Extension: 0 degrees  - Flexion: 120 degrees    Strength:  - Patient able to perform a straight leg raise  - Hip flexion: 5/5  - Knee extension: 5/5  - Knee flexion: 5/5  - Ankle DF: 5/5  - Ankle PF: 5/5    Meniscus:  - Medial joint line tenderness: no  - Lateral joint line tenderness: no  - Nano's: no  - Flexion compression: no  - Thessaly test: n/a    Collateral ligaments:  -  Varus laxity at full extension: no  -  Varus laxity at full in 30° of knee flexion: no  -  Valgus laxity at full extension: no  -  Valgus laxity at full in 30° of knee flexion: no    Cruciate ligaments:  - Lachman: 1B with guarding  - Pivot shift test: deferred due to guarding  - Anterior drawer: 1B with guarding  - Posterior drawer: 1A  - Posterior tibial sag: no    Patella:  - Apprehension with lateral patellar translation: no  - Able to sublux 2 quadrant with firm endpoint  - Apprehension with medial patellar translation: no  - Able to sublux 2 quadrant with firm endpoint  - J sign: no  - Patella grind test: no  - Patella  crepitus: no  - Patella tilt: no  - Squat test: n/a    Range of motion testing of the hip and ankle are within normal limits.    No calf tenderness to palpation bilaterally. Negative Yovani test    LE NV Exam:   +2 DP/PT pulses bilaterally  Sensation intact to light touch L2-S1 bilaterally, SPN/DPN/tibial/saphenous/sural nerve distributions  Motor intact in SPN/DPN/TA nerve distributions      Focused left  shoulder exam:  No paracervical tenderness.   No cervical tenderness.   No pain with neck flexion, extension, side-to-side bending, or rotation.   Negative Spurling's bilaterally.    Inspection:  - Skin: intact, no erythema or ecchymosis, no open wounds  - Atrophy of supraspinatus or infraspinatus: no  - Scapular winging: no  - Scapular positioning: normal    Tenderness to Palpation:  - SC joint: no  - Clavicle: no  - Lateral aspect of acromion: no  - Posterior joint line: no  - AC joint: no  - Bicipital groove: no    Shoulder ROM:  - Passive forward flexion: 90 degrees (170 contralateral)  - Active forward flexion: 90 degrees (170 contralateral)  - Passive external rotation: 15 degrees (70 contralateral)  - Active external rotation: 15 degrees (70 contralateral)  - Internal rotation to: L5  - Passive internal rotation with 70 degrees abduction: 20 degrees  - 100 degrees abduction contralateral  - 80 degrees internal rotation contralateral    Strength testing:  - Empty can: 5/5 with pain  - Resisted external rotation: 5/5  - Resisted internal rotation: 5/5 with pain  - Belly press: negative  - Bear hug test: n/a  - Hornblower: n/a  - External rotation lag sign: negative    Impingement:  - Hawkin's impingement test: negative  - Neer impingement sign: negative    Biceps testing:  - Yeagerson: negative  - Speeds: negative    Stability:  - Apprehension sign: negative  - Relocation test: negative  - Anterior load and shift: negative  - Posterior load and shift: negative  - Rachel test: negative  - Stanton test: negative  -  Sulcus sign: negative      UE NV Exam:   +2 Radial pulses bilaterally.   Fingers are warm and well-perfused.  SILT C5-T1, SILT median, ulnar, radial nerve distributions  Radial/median/ulnar/PIN/AIN motor intact       Knee Imaging    X-rays of the right knee were obtained on 3/16/2025 and reviewed with the patient. Per my independent review, the imaging shows No evidence of fracture, dislocation, or significant degenerative disease.    MRI of the right knee were obtained on 3/22/2025 and reviewed with the patient. Per my independent review, the imaging shows complete rupture of the ACL near the femoral attachment. No meniscus tears noted. PCL intact. No chondral injuries noted.    MR arthrogram of the left shoulder was obtained on 4/14/2025 and reviewed the patient.  Per my review in the radiology report, there is no evidence of labral pathology or Hill-Sachs lesion.  No evidence of chondral injury.  Rotator cuff appears intact.  No significant degenerative changes noted.      Procedures:  None      This note was written with the use of dictation software. Please excuse any errors.      Scribe Attestation      I,:  Curt Freedman PA-C am acting as a scribe while in the presence of the attending physician.:       I,:  Tony Brewster MD personally performed the services described in this documentation    as scribed in my presence.:

## 2025-04-23 NOTE — ASSESSMENT & PLAN NOTE
I reviewed the history and exam with the patient in clinic today.  The patient has a doing physical therapy and has regained her range of motion.  She has a minimal effusion.  She has been wearing the knee brace intermittently and has had no further instability events.  I discussed at this point she can discontinue physical therapy as patient does go to the gym on her own and would prefer to do a home exercise program.  We did discuss surgical intervention.  The patient mentioned that she has a vacation in June and will be helping her daughter move to college in August.  She would like to postpone any surgery on her knee until after that.  I did discuss that delay of surgery can result in further injury to the knee including meniscal or chondral pathology if she does have further instability events.  I did discuss the patient could wear the knee brace to help with stability when doing more active activities.  I discussed that she should avoid running, jumping, quick pivoting and twisting activities that can result in further pivot shift injuries.  I will see the patient back in 6 weeks for repeat evaluation and to discuss surgical timing. The patient demonstrated understanding of the discussion and was in agreement with the plan.  All of the questions were answered.  Patient can reach out to clinic with any questions or concerns at any time.

## 2025-04-24 ENCOUNTER — APPOINTMENT (OUTPATIENT)
Dept: PHYSICAL THERAPY | Facility: CLINIC | Age: 40
End: 2025-04-24
Payer: COMMERCIAL

## 2025-05-01 ENCOUNTER — OFFICE VISIT (OUTPATIENT)
Dept: OBGYN CLINIC | Facility: CLINIC | Age: 40
End: 2025-05-01
Payer: COMMERCIAL

## 2025-05-01 VITALS
OXYGEN SATURATION: 95 % | TEMPERATURE: 97.7 F | HEIGHT: 63 IN | HEART RATE: 96 BPM | WEIGHT: 171 LBS | BODY MASS INDEX: 30.3 KG/M2

## 2025-05-01 DIAGNOSIS — M75.02 ADHESIVE CAPSULITIS OF LEFT SHOULDER: Primary | ICD-10-CM

## 2025-05-01 PROCEDURE — 20611 DRAIN/INJ JOINT/BURSA W/US: CPT | Performed by: FAMILY MEDICINE

## 2025-05-01 PROCEDURE — 99214 OFFICE O/P EST MOD 30 MIN: CPT | Performed by: FAMILY MEDICINE

## 2025-05-01 RX ORDER — SPIRONOLACTONE 100 MG/1
100 TABLET, FILM COATED ORAL DAILY
COMMUNITY
Start: 2025-04-03 | End: 2026-04-03

## 2025-05-01 RX ORDER — TRIAMCINOLONE ACETONIDE 40 MG/ML
40 INJECTION, SUSPENSION INTRA-ARTICULAR; INTRAMUSCULAR
Status: COMPLETED | OUTPATIENT
Start: 2025-05-01 | End: 2025-05-01

## 2025-05-01 RX ORDER — BUPIVACAINE HYDROCHLORIDE 5 MG/ML
3.5 INJECTION, SOLUTION PERINEURAL
Status: COMPLETED | OUTPATIENT
Start: 2025-05-01 | End: 2025-05-01

## 2025-05-01 RX ADMIN — BUPIVACAINE HYDROCHLORIDE 3.5 ML: 5 INJECTION, SOLUTION PERINEURAL at 13:30

## 2025-05-01 RX ADMIN — TRIAMCINOLONE ACETONIDE 40 MG: 40 INJECTION, SUSPENSION INTRA-ARTICULAR; INTRAMUSCULAR at 13:30

## 2025-05-01 NOTE — PATIENT INSTRUCTIONS
F/u with Dr. Brewster  US guided L shoulder GH joint steroid injection given  Icing/heat/OTC pain meds as needed.

## 2025-05-01 NOTE — PROGRESS NOTES
"[facility] [address] [id number] [phone]      Assessment:  1. Adhesive capsulitis of left shoulder  -     Ambulatory Referral to Orthopedic Surgery  -     Large joint arthrocentesis: L glenohumeral    Assessment & Plan  Adhesive capsulitis of left shoulder    Orders:    Ambulatory Referral to Orthopedic Surgery    Large joint arthrocentesis: L glenohumeral      Patient Instructions   F/u with Dr. Brewster  US guided L shoulder GH joint steroid injection given  Icing/heat/OTC pain meds as needed.    Plan:  Patient Instructions   F/u with Dr. Brewster  US guided L shoulder GH joint steroid injection given  Icing/heat/OTC pain meds as needed.     Return if symptoms worsen or fail to improve.    Chief Complaint:  Chief Complaint   Patient presents with    Left Shoulder - Pain     USGI       Subjective:   HPI    Patient ID: Cristal Devine is a 40 y.o. female     I was asked by Dr. Brewster to evaluate Ms. Devine for an US guided L shoulder GH joint steroid injection  She has a hx of adhesive capsulitis with reduced ROM  Pain with movement.  Reaction to a tetanus shot- had a lavage  Sharp pain- horrible pain  No pain meds    Review of Systems   Constitutional:  Negative for fatigue and fever.   Respiratory:  Negative for shortness of breath.    Cardiovascular:  Negative for chest pain.   Gastrointestinal:  Negative for abdominal pain and nausea.   Genitourinary:  Negative for dysuria.   Musculoskeletal:  Positive for arthralgias.   Skin:  Negative for rash and wound.   Neurological:  Negative for weakness and headaches.       Objective:  Vitals:  Pulse 96   Temp 97.7 °F (36.5 °C) (Tympanic)   Ht 5' 3\" (1.6 m)   Wt 77.6 kg (171 lb)   SpO2 95%   BMI 30.29 kg/m²     The following portions of the patient's history were reviewed and updated as appropriate: allergies, current medications, past family history, past medical history, past " "social history, past surgical history, and problem list.    Physical exam:  Physical Exam  Constitutional:       Appearance: Normal appearance. She is normal weight.   HENT:      Head: Normocephalic.   Eyes:      Extraocular Movements: Extraocular movements intact.   Pulmonary:      Effort: Pulmonary effort is normal.   Musculoskeletal:      Cervical back: Normal range of motion.   Skin:     General: Skin is warm and dry.   Neurological:      General: No focal deficit present.      Mental Status: She is alert and oriented to person, place, and time. Mental status is at baseline.   Psychiatric:         Mood and Affect: Mood normal.         Behavior: Behavior normal.         Thought Content: Thought content normal.         Judgment: Judgment normal.       Left Shoulder Exam     Tenderness   The patient is experiencing no tenderness.     Range of Motion   Active abduction:  100 abnormal   Passive abduction:  abnormal   Extension:  normal   External rotation:  normal   Forward flexion:  abnormal   Internal rotation 0 degrees:  normal   Internal rotation 90 degrees:  normal     Muscle Strength   Abduction: 4/5   Internal rotation: 4/5   External rotation: 4/5   Supraspinatus: 4/5   Subscapularis: 4/5   Biceps: 4/5     Tests   Best test: positive             I have personally reviewed pertinent films in PACS and my interpretation is XR L shoulder- no fx, calcific tendonitis..    Large joint arthrocentesis: L glenohumeral  Universal Protocol:  procedure performed by consultantConsent: Verbal consent obtained.  Risks and benefits: risks, benefits and alternatives were discussed  Consent given by: patient  Time out: Immediately prior to procedure a \"time out\" was called to verify the correct patient, procedure, equipment, support staff and site/side marked as required.  Timeout called at: 5/1/2025 1:51 PM.  Site marked: the operative site was marked  Supporting Documentation  Indications: pain     Is this a Visco " injection? NoProcedure Details  Location: shoulder - L glenohumeral  Preparation: Patient was prepped and draped in the usual sterile fashion  Needle size: 25 G  Ultrasound guidance: yes  Approach: posterolateral  Medications administered: 40 mg triamcinolone acetonide 40 mg/mL; 3.5 mL bupivacaine 0.5 %    Patient tolerance: patient tolerated the procedure well with no immediate complications  Dressing:  Sterile dressing applied          Jose Morley MD

## 2025-05-07 ENCOUNTER — EVALUATION (OUTPATIENT)
Dept: PHYSICAL THERAPY | Facility: CLINIC | Age: 40
End: 2025-05-07
Payer: COMMERCIAL

## 2025-05-07 VITALS — DIASTOLIC BLOOD PRESSURE: 73 MMHG | SYSTOLIC BLOOD PRESSURE: 109 MMHG | HEART RATE: 93 BPM

## 2025-05-07 DIAGNOSIS — M75.02 ADHESIVE CAPSULITIS OF LEFT SHOULDER: ICD-10-CM

## 2025-05-07 PROCEDURE — 97112 NEUROMUSCULAR REEDUCATION: CPT | Performed by: PHYSICAL THERAPIST

## 2025-05-07 PROCEDURE — 97140 MANUAL THERAPY 1/> REGIONS: CPT | Performed by: PHYSICAL THERAPIST

## 2025-05-07 PROCEDURE — 97162 PT EVAL MOD COMPLEX 30 MIN: CPT | Performed by: PHYSICAL THERAPIST

## 2025-05-07 NOTE — PROGRESS NOTES
PT Evaluation     Today's date: 2025  Patient name: Cristal Devine  : 1985  MRN: [id number]  Referring provider: Curt Freedman P*  Dx:   Encounter Diagnosis     ICD-10-CM    1. Adhesive capsulitis of left shoulder  M75.02 Ambulatory Referral to Physical Therapy                     Assessment  Impairments: abnormal coordination, abnormal gait, abnormal muscle firing, abnormal or restricted ROM, abnormal movement, activity intolerance, impaired physical strength, lacks appropriate home exercise program, pain with function, safety issue, weight-bearing intolerance and poor posture   Functional limitations: Difficulty with reaching at and above shoulder height, crossing midline.  Symptom irritability: moderate    Assessment details: Cristal Devine is a 40 y.o. female who presents to outpatient PT with a  Adhesive capsulitis of left shoulder.  No further referral appears necessary at this time based upon examination results. Pt presents with decreased strength, ROM, balance, functional activity tolerance, and pain with movement in her left shoulder,  which is  limiting her ability to perform the aforementioned functional activities.  Etiologic factors include repetitive poor body mechanics. Prognosis is good given HEP compliance and PT 2-3/wk.  Please contact me if you have any questions or recommendations.  Thank you for the opportunity to share in  Cristal keith.     Understanding of Dx/Px/POC: good     Prognosis: good    Goals  STG to be achieved in 4 weeks     1. Pt will reduce subjective pain rating by at least 50 percent the help facilitate return to PLOF  2. Pt will improve Left shoulder AROM by at least 10 degrees to help promote improved functional activity tolerance   3. Pt will improve Left shoulder MMT scores by at least 1/2 grade to promote improved functional activity tolerance      LTG to be achieved in 6-8 weeks   1. Pt will be complaint with HEP   2. Pt will improve Left shoulder AROM  "to WFL, to help facilitate independence with ADL's, IADL's, and functional activities   3. Pt will improve Left shoulder Strength to WFL to help facilitate independence with ADL's, IADL's, and functional activities   4. Pt will have no limitations with ambulation to help facilitate independence at home and in the community.   5. Pt will have no limitations with stair negotiation to help facilitate independence at home and in the community           Plan  Patient would benefit from: skilled physical therapy    Planned therapy interventions: ADL training, balance, balance/weight bearing training, flexibility, functional ROM exercises, gait training, graded activity, graded exercise, graded motor, home exercise program, joint mobilization, manual therapy, neuromuscular re-education, patient education, postural training, strengthening, stretching, therapeutic activities and therapeutic exercise    Frequency: 2x week  Duration in weeks: 12  Treatment plan discussed with: patient, PTA and referring physician        Subjective Evaluation    History of Present Illness  Mechanism of injury: 40 year old female presents with CC of left frozen shoulder   Started 2 years ago   Tried PT in the past as well as lavage   Recently had steroid injection In the left shoulder   \" Hurts\" today, But notes more movement since injection  Difficulty with sleeping, reaching across midline, d/t pain in the shoulder   Goals for PT, are to not be in pain.   Patient Goals  Patient goals for therapy: increased strength, decreased pain and increased motion  Patient goal: to have more movement in the shoulder.  Pain  Current pain ratin  At best pain ratin  At worst pain ratin  Quality: sharp  Exacerbated by: reaching, across midline.  Progression: improved (since injections more movement)          Objective     Active Range of Motion   Left Shoulder   Flexion: 120 degrees   Abduction: 124 degrees   External rotation BTH: Active external " rotation behind the head: C7.   Internal rotation BTB: Active internal rotation behind the back: Reaches to left buttocks.     Strength/Myotome Testing     Left Shoulder     Planes of Motion   Flexion: 3+   Abduction: 3+   External rotation at 0°: 4-   Internal rotation at 0°: 4-                  POC Expires Reeval for Medicare to be completed Unit LImit Auth Expiration Date PT/OT/STVisit Limit   8/7 By visit        Completed on                  VISIT TRACKER  DATE             Visit #             Remaining                  Precautions:       Manuals 5/7            PROM left shoulder RR                                                   Neuro Re-Ed             MTP LTP             TB ER IR              Supine cane              Compass             Supine ABC              UBE for postural correction  120 5/5                          Ther Ex             Upper trap              Levator stretch              Doorway stretch                                                                               Ther Activity                                       Gait Training                                       Modalities

## 2025-05-13 ENCOUNTER — OFFICE VISIT (OUTPATIENT)
Dept: PHYSICAL THERAPY | Facility: CLINIC | Age: 40
End: 2025-05-13
Payer: COMMERCIAL

## 2025-05-13 ENCOUNTER — OFFICE VISIT (OUTPATIENT)
Dept: BARIATRICS | Facility: CLINIC | Age: 40
End: 2025-05-13
Payer: COMMERCIAL

## 2025-05-13 VITALS
OXYGEN SATURATION: 99 % | TEMPERATURE: 98.2 F | SYSTOLIC BLOOD PRESSURE: 112 MMHG | RESPIRATION RATE: 20 BRPM | BODY MASS INDEX: 30.12 KG/M2 | DIASTOLIC BLOOD PRESSURE: 72 MMHG | WEIGHT: 170 LBS | HEART RATE: 97 BPM | HEIGHT: 63 IN

## 2025-05-13 DIAGNOSIS — K21.9 GASTROESOPHAGEAL REFLUX DISEASE WITHOUT ESOPHAGITIS: ICD-10-CM

## 2025-05-13 DIAGNOSIS — E66.811 CLASS 1 OBESITY: Primary | ICD-10-CM

## 2025-05-13 DIAGNOSIS — M75.02 ADHESIVE CAPSULITIS OF LEFT SHOULDER: Primary | ICD-10-CM

## 2025-05-13 PROCEDURE — 97140 MANUAL THERAPY 1/> REGIONS: CPT | Performed by: PHYSICAL THERAPIST

## 2025-05-13 PROCEDURE — 99203 OFFICE O/P NEW LOW 30 MIN: CPT | Performed by: PHYSICIAN ASSISTANT

## 2025-05-13 PROCEDURE — 97112 NEUROMUSCULAR REEDUCATION: CPT | Performed by: PHYSICAL THERAPIST

## 2025-05-13 RX ORDER — TIRZEPATIDE 2.5 MG/.5ML
2.5 INJECTION, SOLUTION SUBCUTANEOUS WEEKLY
Qty: 2 ML | Refills: 0 | Status: SHIPPED | OUTPATIENT
Start: 2025-05-13 | End: 2025-06-10

## 2025-05-13 NOTE — PROGRESS NOTES
Daily Note     Today's date: 2025  Patient name: Cristal Devine  : 1985  MRN: [id number]  Referring provider: Curt Freedman P*  Dx:   Encounter Diagnosis     ICD-10-CM    1. Adhesive capsulitis of left shoulder  M75.02                      Subjective: Pt offers no new complaints       Objective: See treatment diary below      Assessment: Tolerated treatment well. Patient exhibited good technique with therapeutic exercises and would benefit from continued PT. Good tolerance to initial PT treatment.       Plan: Continue per plan of care.        POC Expires Reeval for Medicare to be completed Unit LImit Auth Expiration Date PT/OT/STVisit Limit    By visit        Completed on                  VISIT TRACKER  DATE            Visit # 1 2           Remaining 9 8                Precautions:       Manuals            PROM left shoulder RR RR                                                  Neuro Re-Ed             MTP LTP  Red 5''20x           TB ER IR   Red 2x10           Supine cane   5''30x           Compass  2x10 each            Supine ABC              UBE for postural correction  120 5/5  120 5/5                        Ther Ex             Upper trap   30''3x           Levator stretch   30''3x           Doorway stretch                                                                               Ther Activity                                       Gait Training                                       Modalities

## 2025-05-13 NOTE — PATIENT INSTRUCTIONS
Goals:    Food log (ie.) [email or web address],etc.   No sugary beverages. At least 64oz of water daily.  Increase physical activity by 10 minutes daily. Gradually increase physical activity to a goal of 5 days per week for 30 minutes of MODERATE intensity PLUS 2 days per week of FULL BODY resistance training  6643-5801 calories per day  25-35 grams of dietary fiber per day  5-10 servings of fruits and vegetables per day  Carb Manager  My Net Diary    Visit [email or web address] for further information/injection instructions. Please eat small frequent meals to help reduce nausea. Lemon water and saltine crackers may help with this. If you experience fever, nausea/vomiting, and pain radiating to your back this may be a sign of pancreatitis. Please have ER evaluation with this occurs.

## 2025-05-13 NOTE — ASSESSMENT & PLAN NOTE
-Discussed options of HealthyCORE-Intensive Lifestyle Intervention Program, Very Low Calorie Diet-VLCD, and Conservative Program and the role of weight loss medications.  -Initial weight loss goal of 5-10% weight loss for improved health  -Screening labs: reviewed CMP, TSH, Insulin level  -Patient is interested in pursuing conservative program. -Calorie goals, sample menu, portion size guidelines, and food logging reviewed with the patient.    -Patient is also interested in AOMs. Discussed the importance of consistent dietary and lifestyle changes for long term success. Patient is interested in Tirzepatide. Previously on compounded version, but now has insurance. Denies personal hx pancreatitis or family hx MEN2 or MTC. SE profile reviewed. Currently on compounded Semaglutide for the past month. Advise she discontinue this now.  -medication agreement signed.

## 2025-05-13 NOTE — PROGRESS NOTES
Assessment/Plan:    Class 1 obesity  -Discussed options of HealthyCORE-Intensive Lifestyle Intervention Program, Very Low Calorie Diet-VLCD, and Conservative Program and the role of weight loss medications.  -Initial weight loss goal of 5-10% weight loss for improved health  -Screening labs: reviewed CMP, TSH, Insulin level  -Patient is interested in pursuing conservative program. -Calorie goals, sample menu, portion size guidelines, and food logging reviewed with the patient.    -Patient is also interested in AOMs. Discussed the importance of consistent dietary and lifestyle changes for long term success. Patient is interested in Tirzepatide. Previously on compounded version, but now has insurance. Denies personal hx pancreatitis or family hx MEN2 or MTC. SE profile reviewed. Currently on compounded Semaglutide for the past month. Advise she discontinue this now.  -medication agreement signed.    Gastroesophageal reflux disease without esophagitis  -On Omeprazole  -avoid food triggers, large portion sizes  -can improve with weight loss    Goals:    Food log (ie.) [email or web address],[email or web address],etc.   No sugary beverages. At least 64oz of water daily.  Increase physical activity by 10 minutes daily. Gradually increase physical activity to a goal of 5 days per week for 30 minutes of MODERATE intensity PLUS 2 days per week of FULL BODY resistance training  1346-9273 calories per day  25-35 grams of dietary fiber per day  5-10 servings of fruits and vegetables per day  Carb Manager  My Net Diary    Visit [email or web address] for further information/injection instructions. Please eat small frequent meals to help reduce nausea. Lemon water and saltine crackers may help with this. If you experience fever, nausea/vomiting, and pain radiating to your back this may be a sign of pancreatitis. Please have ER evaluation with this occurs.      Follow up in approximately  4 months  with Non-Surgical  Physician/Advanced Practitioner.    Diagnoses and all orders for this visit:    Class 1 obesity  -     tirzepatide (Zepbound) 2.5 mg/0.5 mL auto-injector; Inject 0.5 mL (2.5 mg total) under the skin once a week for 28 days    BMI 30.0-30.9,adult  -     Ambulatory Referral to Weight Management    Gastroesophageal reflux disease without esophagitis    Other orders  -     Discontinue: semaglutide, 2 mg/dose, (Ozempic) 8 mg/ mL injection pen; Inject 2 mg under the skin every 7 days          Subjective:   Chief Complaint   Patient presents with    Consult       Patient ID: Cristal Devine  is a 40 y.o. female with excess weight/obesity here to pursue weight managment.    Past Medical History:   Diagnosis Date    Acid reflux     Acute appendicitis with localized peritonitis, without perforation, abscess, or gangrene 01/05/2025    COVID-19     GERD (gastroesophageal reflux disease)     Hyperlipidemia          HPI:  Obesity/Excess Weight:  Severity: Mild  Onset:  past 7 years after pregnancy - reports being on bedrest for 5 months during her pregnancy  Modifiers: self created diets, exercise, portion control,  compounded tirzepatide (7.5mg lost 35 lbs and then stopped due to nausea and plateau) and semaglutide - causing nausea  Contributing factors: Stress/Emotional Eating and slow metabolism  Associated symptoms: body image issues and wants to feel better    Goals: 135 lbs  Highest: 198 lbs    Hydration: water 80oz, black coffee  ETOH: 1-3 drinks per week, not every week  Exercise: gym 5 days per week - treadmill for 20 minutes and strength training  Sleep:  8 hours, wakes frequently  Occupation:  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    Review of Systems   Constitutional:  Negative for chills and fever.   HENT:  Negative for sore throat.    Respiratory:  Negative for cough and shortness of breath.    Cardiovascular:  Negative for chest pain and palpitations.   Gastrointestinal:  Positive for nausea. Negative for abdominal pain, constipation, diarrhea and vomiting.   Genitourinary:  Negative for dysuria.   Musculoskeletal:  Positive for arthralgias.   Skin:  Negative for rash.   Neurological:  Positive for headaches. Negative for dizziness.   Psychiatric/Behavioral:  Positive for dysphoric mood (mildly). The patient is nervous/anxious.        Objective:    /72 (BP Location: Right arm, Patient Position: Sitting, Cuff Size: Large)   Pulse 97   Temp 98.2 °F (36.8 °C) (Temporal)   Resp 20   Ht 5' 3\" (1.6 m)   Wt 77.1 kg (170 lb)   SpO2 99%   BMI 30.11 kg/m²     Physical Exam  Vitals and nursing note reviewed.      Constitutional   General appearance: Abnormal.  well developed and obese.   Eyes No conjunctival pallor.   Ears, Nose, Mouth, and Throat Oral mucosa moist.   Pulmonary   Respiratory effort: No increased work of breathing or signs of respiratory distress.    Auscultation of lungs: Clear to auscultation, equal breath sounds bilaterally, no wheezes, no rales, no rhonci.    Cardiovascular   Auscultation of heart: Normal rate and rhythm, normal S1 and S2, without murmurs.    Examination of extremities for edema and/or varicosities: Normal.  no edema.   Abdomen   Abdomen: Abnormal.  The abdomen was obese. Bowel sounds were normal. The abdomen was soft and nontender.   Musculoskeletal   Gait and station: Normal.    Psychiatric   Orientation to person, place and time: Normal.    Affect: appropriate   "

## 2025-05-15 ENCOUNTER — OFFICE VISIT (OUTPATIENT)
Dept: PHYSICAL THERAPY | Facility: CLINIC | Age: 40
End: 2025-05-15
Payer: COMMERCIAL

## 2025-05-15 DIAGNOSIS — M75.02 ADHESIVE CAPSULITIS OF LEFT SHOULDER: Primary | ICD-10-CM

## 2025-05-15 PROCEDURE — 97112 NEUROMUSCULAR REEDUCATION: CPT | Performed by: PHYSICAL THERAPIST

## 2025-05-15 NOTE — PROGRESS NOTES
Daily Note     Today's date: 5/15/2025  Patient name: Cristal Devine  : 1985  MRN: [id number]  Referring provider: Curt Freedman P*  Dx:   Encounter Diagnosis     ICD-10-CM    1. Adhesive capsulitis of left shoulder  M75.02                      Subjective: Pt states left shoulder felt better following her initial treatment.       Objective: See treatment diary below      Assessment: Tolerated treatment fair. Patient demonstrated fatigue post treatment, exhibited good technique with therapeutic exercises, and would benefit from continued PT. Treatment limited d/t patient reporting dizziness and requesting to stop tx. She states she did not eat anything prior to PT. BP, 134/97 post treatment. Pt leaves clinic in no distress today. Will continue to monitor.       Plan: Continue per plan of care.        POC Expires Reeval for Medicare to be completed Unit LImit Auth Expiration Date PT/OT/STVisit Limit    By visit        Completed on                  VISIT TRACKER  DATE 5/7 5/13 5/15          Visit # 1 2 3          Remaining 9 8 7               Precautions:       Manuals 5/7 5/13 5/15          PROM left shoulder RR RR RR                                                 Neuro Re-Ed             MTP LTP  Red 5''20x Green 5''20x          TB ER IR   Red 2x10 Green 5'20x          Supine cane   5''30x           Compass  2x10 each            Supine ABC              UBE for postural correction  120 5/5  120 5/5 120 5/5                       Ther Ex             Upper trap   30''3x 30''3x          Levator stretch   30''3x 30''3x          Doorway stretch                                                                               Ther Activity                                       Gait Training                                       Modalities

## 2025-05-19 ENCOUNTER — OFFICE VISIT (OUTPATIENT)
Dept: PHYSICAL THERAPY | Facility: CLINIC | Age: 40
End: 2025-05-19
Payer: COMMERCIAL

## 2025-05-19 DIAGNOSIS — M75.02 ADHESIVE CAPSULITIS OF LEFT SHOULDER: Primary | ICD-10-CM

## 2025-05-19 PROCEDURE — 97140 MANUAL THERAPY 1/> REGIONS: CPT

## 2025-05-19 PROCEDURE — 97112 NEUROMUSCULAR REEDUCATION: CPT

## 2025-05-19 NOTE — PROGRESS NOTES
"Daily Note     Today's date: 2025  Patient name: Cristal Devine  : 1985  MRN: [id number]  Referring provider: Curt Freedman P*  Dx:   Encounter Diagnosis     ICD-10-CM    1. Adhesive capsulitis of left shoulder  M75.02                      Subjective: Pt reports L shoulder is feeling better since starting PT. No c/o dizziness today.       Objective: See treatment diary below      Assessment: Tolerated treatment well. Patient demonstrated fatigue post treatment, exhibited good technique with therapeutic exercises, and would benefit from continued PT      Plan: Continue per plan of care.        POC Expires Reeval for Medicare to be completed Unit LImit Auth Expiration Date PT/OT/STVisit Limit    By visit        Completed on                  VISIT TRACKER  DATE 5/7 5/13 5/15 5/19         Visit # 1 2 3 4         Remaining 9 8 7 6              Precautions:       Manuals 5/7 5/13 5/15 5/19         PROM left shoulder RR RR RR MJC                                                Neuro Re-Ed             MTP LTP  Red 5''20x Green 5''20x Green  5\" x20         TB ER IR   Red 2x10 Green 5'20x Green  5\" x20         Supine cane   5''30x  5\" x30         Compass  2x10 each   2x10 ea         Supine ABC              UBE for postural correction  120 5/5  120 5/5 120 5/5 120 rpm  5'/5'                      Ther Ex             Upper trap   30''3x 30''3x 30\" x3         Levator stretch   30''3x 30''3x 30\" x3         Doorway stretch                                                                               Ther Activity             Pulleys flex/scap      5\" x20 ea                      Gait Training                                       Modalities                                                "

## 2025-05-21 ENCOUNTER — OFFICE VISIT (OUTPATIENT)
Dept: PHYSICAL THERAPY | Facility: CLINIC | Age: 40
End: 2025-05-21
Payer: COMMERCIAL

## 2025-05-21 DIAGNOSIS — M75.02 ADHESIVE CAPSULITIS OF LEFT SHOULDER: Primary | ICD-10-CM

## 2025-05-21 PROCEDURE — 97110 THERAPEUTIC EXERCISES: CPT

## 2025-05-21 PROCEDURE — 97112 NEUROMUSCULAR REEDUCATION: CPT

## 2025-05-21 PROCEDURE — 97140 MANUAL THERAPY 1/> REGIONS: CPT

## 2025-05-21 NOTE — PROGRESS NOTES
"Daily Note     Today's date: 2025  Patient name: Cristal Devine  : 1985  MRN: [id number]  Referring provider: Curt Freedman P*  Dx:   Encounter Diagnosis     ICD-10-CM    1. Adhesive capsulitis of left shoulder  M75.02           Start Time: 1104          Subjective: Pt reports noticed improvement in functional mobility L shoulder.      Objective: See treatment diary below      Assessment: Tolerated treatment well. Issued handouts and reviewed with patient for HEP. Patient demonstrated fatigue post treatment, exhibited good technique with therapeutic exercises, and would benefit from continued PT      Plan: Continue per plan of care.        POC Expires Reeval for Medicare to be completed Unit LImit Auth Expiration Date PT/OT/STVisit Limit    By visit        Completed on                  VISIT TRACKER  DATE 5/7 5/13 5/15 5/19 5/21        Visit # 1 2 3 4 5        Remaining 9 8 7 6 5             Precautions:       Manuals 5/7 5/13 5/15 5/19 521        PROM left shoulder RR RR RR MJC MJC                                               Neuro Re-Ed             MTP LTP  Red 5''20x Green 5''20x Green  5\" x20 GTB  5\" x20 ea        TB ER IR   Red 2x10 Green 5'20x Green   x20 GTB  X20 ea        Supine cane   5''30x  5\" x30 5\"\" x30        Compass  2x10 each   2x10 ea 2x10 ea        Supine ABC              UBE for postural correction  120 5/5  120 5/5 120 5/5 120 rpm  5'/5' 120 rpm  5'/5'                     Ther Ex             Upper trap   30''3x 30''3x 30\" x3 30\" x3        Levator stretch   30''3x 30''3x 30\" x3 30\" x3        Doorway stretch      30\" x3                                                                         Ther Activity             Pulleys flex/scap      5\" x20 ea 5\" x20 ea                     Gait Training                                       Modalities                                                  "

## 2025-05-23 ENCOUNTER — TELEPHONE (OUTPATIENT)
Dept: BARIATRICS | Facility: CLINIC | Age: 40
End: 2025-05-23

## 2025-05-27 ENCOUNTER — OFFICE VISIT (OUTPATIENT)
Dept: PHYSICAL THERAPY | Facility: CLINIC | Age: 40
End: 2025-05-27
Payer: COMMERCIAL

## 2025-05-27 DIAGNOSIS — M75.02 ADHESIVE CAPSULITIS OF LEFT SHOULDER: Primary | ICD-10-CM

## 2025-05-27 PROCEDURE — 97140 MANUAL THERAPY 1/> REGIONS: CPT

## 2025-05-27 PROCEDURE — 97112 NEUROMUSCULAR REEDUCATION: CPT

## 2025-05-27 NOTE — PROGRESS NOTES
"Daily Note     Today's date: 2025  Patient name: Cristal Devine  : 1985  MRN: [id number]  Referring provider: Curt Freedman P*  Dx:   Encounter Diagnosis     ICD-10-CM    1. Adhesive capsulitis of left shoulder  M75.02                      Subjective: Pt c/o L UT soreness today with limited cervical rotation to L. States she just woke up like that a couple days ago.       Objective: See treatment diary below      Assessment: Tolerated treatment well. Pt reported feeling better following treatment today.No significant limitations noted with L shoulder PROM today. Patient demonstrated fatigue post treatment, exhibited good technique with therapeutic exercises, and would benefit from continued PT      Plan: Continue per plan of care.        POC Expires Reeval for Medicare to be completed Unit LImit Auth Expiration Date PT/OT/STVisit Limit    By visit        Completed on                  VISIT TRACKER  DATE 5/7 5/13 5/15 5/19 5/21 5/27       Visit # 1 2 3 4 5 6       Remaining 9 8 7 6 5 4            Precautions:       Manuals 5/7 5/13 5/15 5/19 521        PROM left shoulder RR RR RR MJC MJC MJC                                              Neuro Re-Ed             MTP LTP  Red 5''20x Green 5''20x Green  5\" x20 GTB  5\" x20 ea GTB  5\" X20 ea       TB ER IR   Red 2x10 Green 5'20x Green   x20 GTB  X20 ea GTB  X20 ea       Supine cane   5''30x  5\" x30 5\"x30 5\" x30       Compass  2x10 each   2x10 ea 2x10 ea 2x10 ea       Supine ABC              UBE for postural correction  120 5/5  120 5/5 120 5/5 120 rpm  5'/5' 120 rpm  5'/5' 120  Rpm  5'/5'                    Ther Ex             Upper trap   30''3x 30''3x 30\" x3 30\" x3 30\" x3       Levator stretch   30''3x 30''3x 30\" x3 30\" x3 30\" x3       Doorway stretch      30\" x3 30\" x3                                                                        Ther Activity             Pulleys flex/scap      5\" x20 ea 5\" x20 ea 5\" x20 ea       Finger ladder      5\" " x5 flex/ scap       Gait Training                                       Modalities

## 2025-05-29 ENCOUNTER — APPOINTMENT (OUTPATIENT)
Dept: PHYSICAL THERAPY | Facility: CLINIC | Age: 40
End: 2025-05-29
Payer: COMMERCIAL

## 2025-06-02 ENCOUNTER — OFFICE VISIT (OUTPATIENT)
Dept: PHYSICAL THERAPY | Facility: CLINIC | Age: 40
End: 2025-06-02
Payer: COMMERCIAL

## 2025-06-02 ENCOUNTER — APPOINTMENT (OUTPATIENT)
Dept: URGENT CARE | Facility: CLINIC | Age: 40
End: 2025-06-02

## 2025-06-02 DIAGNOSIS — M75.02 ADHESIVE CAPSULITIS OF LEFT SHOULDER: Primary | ICD-10-CM

## 2025-06-02 PROCEDURE — 97530 THERAPEUTIC ACTIVITIES: CPT | Performed by: PHYSICAL THERAPIST

## 2025-06-02 PROCEDURE — 97140 MANUAL THERAPY 1/> REGIONS: CPT | Performed by: PHYSICAL THERAPIST

## 2025-06-02 PROCEDURE — 97112 NEUROMUSCULAR REEDUCATION: CPT | Performed by: PHYSICAL THERAPIST

## 2025-06-02 NOTE — PROGRESS NOTES
"Daily Note     Today's date: 2025  Patient name: Cristal Devine  : 1985  MRN: [id number]  Referring provider: Curt Freedman P*  Dx:   Encounter Diagnosis     ICD-10-CM    1. Adhesive capsulitis of left shoulder  M75.02                      Subjective: Pt reports overall her shoulder is moving much better than it has been. She is unsure if she will be able to continue with PT as she is going on vacation and then begins a new job.      Objective: See treatment diary below      Assessment: Pt demonstrates good improvement in shoulder ROM following manual therapy techniques. Provided pt with green TB to complete strengthening exercises at home. Tolerated treatment well. Patient demonstrated fatigue post treatment, exhibited good technique with therapeutic exercises, and would benefit from continued PT      Plan: Progress treatment as tolerated.   Discuss with pt if she is continuing and provide updated HEP if not.       POC Expires Reeval for Medicare to be completed Unit LImit Auth Expiration Date PT/OT/STVisit Limit    By visit        Completed on                  VISIT TRACKER  DATE 5/7 5/13 5/15 5/19 5/21 5/27 6-2      Visit # 1 2 3 4 5 6 7      Remaining 9 8 7 6 5 4 3           Precautions:       Manuals 5/7 5/13 5/15 5/19 521  6-2      PROM left shoulder RR RR RR MJC MJC MJC JG                                             Neuro Re-Ed             MTP LTP  Red 5''20x Green 5''20x Green  5\" x20 GTB  5\" x20 ea GTB  5\" X20 ea GTB  5\" X20 ea      TB ER IR   Red 2x10 Green 5'20x Green   x20 GTB  X20 ea GTB  X20 ea GTB  X20 ea      Supine cane   5''30x  5\" x30 5\"x30 5\" x30 30 x 5\"      Compass  2x10 each   2x10 ea 2x10 ea 2x10 ea 2 x 10 ea      Supine ABC              UBE for postural correction  120 5/5  120 5/5 120 5/5 120 rpm  5'/5' 120 rpm  5'/5' 120  Rpm  5'/5' 120 rpm 5'/5'                   Ther Ex             Upper trap   30''3x 30''3x 30\" x3 30\" x3 30\" x3 3 x 30\"      Levator stretch   " "30''3x 30''3x 30\" x3 30\" x3 30\" x3 3 x 30\"      Doorway stretch      30\" x3 30\" x3 3 x 30\"                                                                       Ther Activity             Pulleys flex/scap      5\" x20 ea 5\" x20 ea 5\" x20 ea 5\" x20 ea      Finger ladder      5\" x5 flex/ scap 5\" x5 flex/ scap      Gait Training                                       Modalities                                                      "

## 2025-06-03 ENCOUNTER — OFFICE VISIT (OUTPATIENT)
Dept: PHYSICAL THERAPY | Facility: CLINIC | Age: 40
End: 2025-06-03
Payer: COMMERCIAL

## 2025-06-03 DIAGNOSIS — M75.02 ADHESIVE CAPSULITIS OF LEFT SHOULDER: Primary | ICD-10-CM

## 2025-06-03 PROCEDURE — 97110 THERAPEUTIC EXERCISES: CPT

## 2025-06-03 PROCEDURE — 97140 MANUAL THERAPY 1/> REGIONS: CPT

## 2025-06-03 NOTE — PROGRESS NOTES
"Daily Note     Today's date: 6/3/2025  Patient name: Cristal Devine  : 1985  MRN: [id number]  Referring provider: Curt Freedman P*  Dx:   Encounter Diagnosis     ICD-10-CM    1. Adhesive capsulitis of left shoulder  M75.02           Start Time: 1000  Stop Time: 1100  Total time in clinic (min): 60 minutes    Subjective: Patient reports that her left shoulder is feeling better and moving better.      Objective: See treatment diary below      Assessment: Tolerated treatment well.   Patient participated in skilled PT session focused on strengthening, stretching, and ROM.  Patient able to complete exercise program with no issues.  Patient continues to experience some mild pain with L shoulder flexion and ER at end of range.  Patient demonstrates improved L shoulder ROM.  Patient would continue to benefit from skilled PT interventions to address strengthening, stretching, and ROM. Patient demonstrated fatigue post treatment and exhibited good technique with therapeutic exercises      Plan: Continue per plan of care.        POC Expires Reeval for Medicare to be completed Unit LImit Auth Expiration Date PT/OT/STVisit Limit    By visit        Completed on                  VISIT TRACKER  DATE 5/7 5/13 5/15 5/19 5/21 5/27 6-2 6/3     Visit # 1 2 3 4 5 6 7 8     Remaining 9 8 7 6 5 4 3 2          Precautions:       Manuals 5/7 5/13 5/15 5/19 521  6-2 6/3     PROM left shoulder RR RR RR MJC MJC MJC JG CD                                            Neuro Re-Ed             MTP LTP  Red 5''20x Green 5''20x Green  5\" x20 GTB  5\" x20 ea GTB  5\" X20 ea GTB  5\" X20 ea GTB 5\" 20x ea     TB ER IR   Red 2x10 Green 5'20x Green   x20 GTB  X20 ea GTB  X20 ea GTB  X20 ea GTB 20x ea     Supine cane   5''30x  5\" x30 5\"x30 5\" x30 30 x 5\" 5\" 30x     Compass  2x10 each   2x10 ea 2x10 ea 2x10 ea 2 x 10 ea 210 ea     Supine ABC              UBE for postural correction  120 5/5  120 5/5 120 5/5 120 rpm  5'/5' 120 rpm  5'/5' " "120  Rpm  5'/5' 120 rpm 5'/5' 120 rpm 5'/5'                  Ther Ex             Upper trap   30''3x 30''3x 30\" x3 30\" x3 30\" x3 3 x 30\" 30\" 3x     Levator stretch   30''3x 30''3x 30\" x3 30\" x3 30\" x3 3 x 30\" 30\" 3x     Doorway stretch      30\" x3 30\" x3 3 x 30\" 30\" 3x                                                                      Ther Activity             Pulleys flex/scap      5\" x20 ea 5\" x20 ea 5\" x20 ea 5\" x20 ea 5\" 20x ea     Finger ladder      5\" x5 flex/ scap 5\" x5 flex/ scap 5\" 5x flex/scap     Gait Training                                       Modalities                                                        "

## 2025-07-05 ENCOUNTER — OFFICE VISIT (OUTPATIENT)
Dept: URGENT CARE | Facility: CLINIC | Age: 40
End: 2025-07-05
Payer: COMMERCIAL

## 2025-07-05 VITALS
HEART RATE: 87 BPM | SYSTOLIC BLOOD PRESSURE: 117 MMHG | TEMPERATURE: 97.9 F | RESPIRATION RATE: 18 BRPM | OXYGEN SATURATION: 97 % | DIASTOLIC BLOOD PRESSURE: 82 MMHG

## 2025-07-05 DIAGNOSIS — N30.01 ACUTE CYSTITIS WITH HEMATURIA: Primary | ICD-10-CM

## 2025-07-05 LAB
SL AMB  POCT GLUCOSE, UA: ABNORMAL
SL AMB LEUKOCYTE ESTERASE,UA: ABNORMAL
SL AMB POCT BILIRUBIN,UA: ABNORMAL
SL AMB POCT BLOOD,UA: ABNORMAL
SL AMB POCT CLARITY,UA: ABNORMAL
SL AMB POCT COLOR,UA: ABNORMAL
SL AMB POCT KETONES,UA: ABNORMAL
SL AMB POCT NITRITE,UA: ABNORMAL
SL AMB POCT PH,UA: 5
SL AMB POCT SPECIFIC GRAVITY,UA: 1.01
SL AMB POCT URINE PROTEIN: ABNORMAL
SL AMB POCT UROBILINOGEN: 1

## 2025-07-05 PROCEDURE — 99213 OFFICE O/P EST LOW 20 MIN: CPT

## 2025-07-05 PROCEDURE — 81002 URINALYSIS NONAUTO W/O SCOPE: CPT

## 2025-07-05 PROCEDURE — 87086 URINE CULTURE/COLONY COUNT: CPT

## 2025-07-05 PROCEDURE — S9088 SERVICES PROVIDED IN URGENT: HCPCS

## 2025-07-05 RX ORDER — NITROFURANTOIN 25; 75 MG/1; MG/1
100 CAPSULE ORAL 2 TIMES DAILY
Qty: 10 CAPSULE | Refills: 0 | Status: SHIPPED | OUTPATIENT
Start: 2025-07-05 | End: 2025-07-10

## 2025-07-05 NOTE — PATIENT INSTRUCTIONS
Take Macrobid as directed.  Phenazopyridine (Azo) may cause Urine discoloration.  Drink plenty of water   Cranberry supplements  Urinate within 5 minutes following intercourse  Follow up with OB/GYN

## 2025-07-05 NOTE — PROGRESS NOTES
[facility]  Name: Cristal Devine      : 1985      MRN: [id number]  Encounter Provider: Rudy Cruz PA-C  Encounter Date: 2025   Encounter department: [facility] [place]  :  Assessment & Plan  Acute cystitis with hematuria    Orders:    Urine culture; Future    POCT urine dip    nitrofurantoin (MACROBID) 100 mg capsule; Take 1 capsule (100 mg total) by mouth 2 (two) times a day for 5 days    Urine dip showing yeast character, small blood, positive nitrite, small leukocytes.    Discussed with patient that urine dip is indicating a UTI, will initiate treatment with Macrobid X5 days.  Discussed urine culture sensitivity timeline with patient, will contact if medication needs to be changed.  Discussed about nephritis and when to present to the emergency room.      Patient Instructions  Take Macrobid as directed.  Phenazopyridine (Azo) may cause Urine discoloration.  Drink plenty of water   Cranberry supplements  Urinate within 5 minutes following intercourse  Follow up with OB/GYN    Follow up with PCP in 3-5 days.  Proceed to  ER if symptoms worsen.    If tests are performed, our office will contact you with results only if changes need to made to the care plan discussed with you at the visit. You can review your full results on [facility].    Chief Complaint:   Chief Complaint   Patient presents with    Possible UTI     Wednesday started with urinary frequency, burning.  Not taking any OTC meds     History of Present Illness   40-year-old female presenting with UTI symptoms X 4 days.  Symptoms include urinary frequency, dysuria.  Currently denying fevers, chills, flank pain, pelvic pain, nausea, vomiting, hematuria.  Has not taken any medications prior to arrival today.          Review of Systems   Constitutional:  Negative for chills and fever.   HENT:  Negative for ear pain and sore throat.    Eyes:  Negative for pain and visual disturbance.   Respiratory:  Negative for  cough and shortness of breath.    Cardiovascular:  Negative for chest pain and palpitations.   Gastrointestinal:  Negative for abdominal pain, nausea and vomiting.   Genitourinary:  Positive for dysuria and frequency. Negative for difficulty urinating, flank pain, hematuria and pelvic pain.   Musculoskeletal:  Negative for arthralgias and back pain.   Skin:  Negative for color change and rash.   Neurological:  Negative for seizures and syncope.   All other systems reviewed and are negative.    Past Medical History   Past Medical History[1]  Past Surgical History[2]  Family History[3]  she reports that she quit smoking about 2 years ago. Her smoking use included cigarettes. She has never used smokeless tobacco. She reports that she does not currently use alcohol after a past usage of about 2.0 standard drinks of alcohol per week. She reports that she does not use drugs.  Current Outpatient Medications   Medication Instructions    meloxicam (MOBIC) 15 mg, Oral, Daily    nitrofurantoin (MACROBID) 100 mg, Oral, 2 times daily    omeprazole (PRILOSEC) 40 mg, Oral, Daily    spironolactone (ALDACTONE) 100 mg, Daily   Allergies[4]     Objective   /82 (BP Location: Right arm)   Pulse 87   Temp 97.9 °F (36.6 °C) (Skin)   Resp 18   LMP 06/28/2025   SpO2 97%      Physical Exam  Vitals and nursing note reviewed.   Constitutional:       General: She is not in acute distress.     Appearance: She is well-developed.   HENT:      Head: Normocephalic and atraumatic.     Eyes:      Conjunctiva/sclera: Conjunctivae normal.       Cardiovascular:      Rate and Rhythm: Normal rate and regular rhythm.      Heart sounds: No murmur heard.  Pulmonary:      Effort: Pulmonary effort is normal. No respiratory distress.      Breath sounds: Normal breath sounds. No rales.   Chest:      Chest wall: No tenderness.   Abdominal:      Palpations: Abdomen is soft.      Tenderness: There is no abdominal tenderness.     Musculoskeletal:          "General: No swelling.      Cervical back: Neck supple.     Skin:     General: Skin is warm and dry.      Capillary Refill: Capillary refill takes less than 2 seconds.     Neurological:      Mental Status: She is alert.     Psychiatric:         Mood and Affect: Mood normal.         Portions of the record may have been created with voice recognition software.  Occasional wrong word or \"sound a like\" substitutions may have occurred due to the inherent limitations of voice recognition software.  Read the chart carefully and recognize, using context, where substitutions have occurred.       [1]   Past Medical History:  Diagnosis Date    Acid reflux     Acute appendicitis with localized peritonitis, without perforation, abscess, or gangrene 2025    COVID-19     GERD (gastroesophageal reflux disease)     Hyperlipidemia    [2]   Past Surgical History:  Procedure Laterality Date    APPENDECTOMY LAPAROSCOPIC N/A 2025    Procedure: APPENDECTOMY LAPAROSCOPIC;  Surgeon: Cally Mckinnon DO;  Location: [facility];  Service: General     SECTION      DILATION AND CURETTAGE OF UTERUS      2x    FL INJECTION LEFT SHOULDER (ARTHROGRAM)  2025    TONSILECTOMY AND ADNOIDECTOMY      TONSILLECTOMY      TUBAL LIGATION     [3]   Family History  Problem Relation Name Age of Onset    Heart disease Mother      No Known Problems Father      No Known Problems Sister      No Known Problems Maternal Grandmother      No Known Problems Maternal Grandfather      No Known Problems Paternal Grandmother      No Known Problems Paternal Grandfather      No Known Problems Maternal Aunt      No Known Problems Maternal Aunt      No Known Problems Maternal Aunt      No Known Problems Paternal Aunt      No Known Problems Paternal Aunt      No Known Problems Paternal Aunt     [4]   Allergies  Allergen Reactions    Dilaudid [Hydromorphone] Myalgia     Patient stated muscles tightening around throat and chest after dose, and then resolved " several seconds later.     Metoclopramide Myalgia

## 2025-07-06 LAB — BACTERIA UR CULT: NORMAL

## 2025-07-17 DIAGNOSIS — K21.9 GASTROESOPHAGEAL REFLUX DISEASE WITHOUT ESOPHAGITIS: ICD-10-CM

## 2025-07-17 NOTE — TELEPHONE ENCOUNTER
Medication: omeprazole (Prilosec)    Dose/Frequency: 40 mg capsule, take 1 capsule by mouth once daily    Quantity: 90, R-1    Ph[facility]acy: [facility] ([facility]    Office:   [x] PCP/Provider -   [] Speciality/Provider -     Does the patient have enough for 3 days?   [x] Yes   [] No - Send as HP to POD

## 2025-07-18 RX ORDER — OMEPRAZOLE 40 MG/1
40 CAPSULE, DELAYED RELEASE ORAL DAILY
Qty: 90 CAPSULE | Refills: 1 | Status: SHIPPED | OUTPATIENT
Start: 2025-07-18

## 2025-07-22 DIAGNOSIS — E66.811 CLASS 1 OBESITY: Primary | ICD-10-CM

## 2025-07-22 RX ORDER — TIRZEPATIDE 5 MG/.5ML
5 INJECTION, SOLUTION SUBCUTANEOUS WEEKLY
Qty: 2 ML | Refills: 0 | Status: SHIPPED | OUTPATIENT
Start: 2025-07-22 | End: 2025-07-22

## 2025-07-22 RX ORDER — TIRZEPATIDE 5 MG/.5ML
5 INJECTION, SOLUTION SUBCUTANEOUS WEEKLY
Qty: 2 ML | Refills: 2 | Status: SHIPPED | OUTPATIENT
Start: 2025-07-22

## 2025-08-18 ENCOUNTER — TELEPHONE (OUTPATIENT)
Dept: BARIATRICS | Facility: CLINIC | Age: 40
End: 2025-08-18

## (undated) DEVICE — ETS45 RELOAD STANDARD 45MM: Brand: ENDOPATH

## (undated) DEVICE — DISPOSABLE OR TOWEL: Brand: CARDINAL HEALTH

## (undated) DEVICE — 4-PORT MANIFOLD: Brand: NEPTUNE 2

## (undated) DEVICE — METZENBAUM ADTEC SINGLE USE DISSECTING SCISSORS, SHAFT ONLY, MONOPOLAR, CURVED TO LEFT, WORKING LENGTH: 12 1/4", (310 MM), DIAM. 5 MM, INSULATED, DOUBLE ACTION, STERILE, DISPOSABLE, PACKAGE OF 10 PIECES: Brand: AESCULAP

## (undated) DEVICE — TROCAR: Brand: KII FIOS FIRST ENTRY

## (undated) DEVICE — NEEDLE HYPO 23G X 1-1/2 IN

## (undated) DEVICE — SUT VICRYL 0 UR-6 27 IN J603H

## (undated) DEVICE — 3M™ STERI-STRIP™ COMPOUND BENZOIN TINCTURE 40 BAGS/CARTON 4 CARTONS/CASE C1544: Brand: 3M™ STERI-STRIP™

## (undated) DEVICE — ENDOPATH ETS-FLEX45 ARTICULATING ENDOSCOPIC LINEAR CUTTER, NO RELOAD: Brand: ENDOPATH

## (undated) DEVICE — 3M™ STERI-STRIP™ REINFORCED ADHESIVE SKIN CLOSURES, R1547, 1/2 IN X 4 IN (12 MM X 100 MM), 6 STRIPS/ENVELOPE: Brand: 3M™ STERI-STRIP™

## (undated) DEVICE — GLOVE PI ULTRA TOUCH SZ 6

## (undated) DEVICE — SCD SEQUENTIAL COMPRESSION COMFORT SLEEVE MEDIUM KNEE LENGTH: Brand: KENDALL SCD

## (undated) DEVICE — INTENDED FOR TISSUE SEPARATION, AND OTHER PROCEDURES THAT REQUIRE A SHARP SURGICAL BLADE TO PUNCTURE OR CUT.: Brand: BARD-PARKER SAFETY BLADES SIZE 15, STERILE

## (undated) DEVICE — TUBE SET SMOKE EVAC PNEUMOCLEAR HIGH FLOW

## (undated) DEVICE — ENDOPATH ETS45 2.5MM RELOADS (VASCULAR/THIN): Brand: ENDOPATH

## (undated) DEVICE — TRAY FOLEY 16FR URIMETER SILICONE SURESTEP

## (undated) DEVICE — TROCARS: Brand: KII® BALLOON BLUNT TIP SYSTEM

## (undated) DEVICE — GAUZE SPONGES,16 PLY: Brand: CURITY

## (undated) DEVICE — ENDOPOUCH RETRIEVER SPECIMEN RETRIEVAL BAGS: Brand: ENDOPOUCH RETRIEVER

## (undated) DEVICE — SUT MONOCRYL 4-0 PS-2 18 IN Y496G

## (undated) DEVICE — CHLORAPREP HI-LITE 26ML ORANGE